# Patient Record
Sex: FEMALE | Race: WHITE | NOT HISPANIC OR LATINO | Employment: FULL TIME | URBAN - METROPOLITAN AREA
[De-identification: names, ages, dates, MRNs, and addresses within clinical notes are randomized per-mention and may not be internally consistent; named-entity substitution may affect disease eponyms.]

---

## 2018-02-19 ENCOUNTER — OFFICE VISIT (OUTPATIENT)
Dept: DERMATOLOGY | Facility: CLINIC | Age: 33
End: 2018-02-19
Payer: COMMERCIAL

## 2018-02-19 VITALS — HEART RATE: 67 BPM | DIASTOLIC BLOOD PRESSURE: 75 MMHG | SYSTOLIC BLOOD PRESSURE: 120 MMHG | OXYGEN SATURATION: 100 %

## 2018-02-19 DIAGNOSIS — D18.00 ANGIOMA: ICD-10-CM

## 2018-02-19 DIAGNOSIS — L82.1 SEBORRHEIC KERATOSIS: ICD-10-CM

## 2018-02-19 DIAGNOSIS — D48.5 NEOPLASM OF UNCERTAIN BEHAVIOR OF SKIN: Primary | ICD-10-CM

## 2018-02-19 DIAGNOSIS — L81.4 LENTIGO: ICD-10-CM

## 2018-02-19 DIAGNOSIS — D22.9 NEVUS: ICD-10-CM

## 2018-02-19 PROCEDURE — 88305 TISSUE EXAM BY PATHOLOGIST: CPT | Performed by: PHYSICIAN ASSISTANT

## 2018-02-19 PROCEDURE — 11101 HC BIOPSY SKIN/SUBQ/MUC MEM, SINGLE LESION: CPT | Performed by: PHYSICIAN ASSISTANT

## 2018-02-19 PROCEDURE — 88342 IMHCHEM/IMCYTCHM 1ST ANTB: CPT | Performed by: PHYSICIAN ASSISTANT

## 2018-02-19 PROCEDURE — 11100 HC BIOPSY SKIN/SUBQ/MUC MEM, EACH ADDTL LESION: CPT | Performed by: PHYSICIAN ASSISTANT

## 2018-02-19 PROCEDURE — 99204 OFFICE O/P NEW MOD 45 MIN: CPT | Mod: 25 | Performed by: PHYSICIAN ASSISTANT

## 2018-02-19 PROCEDURE — 88341 IMHCHEM/IMCYTCHM EA ADD ANTB: CPT | Performed by: PHYSICIAN ASSISTANT

## 2018-02-19 RX ORDER — EPINEPHRINE 0.3 MG/.3ML
0.3 INJECTION SUBCUTANEOUS PRN
Status: ON HOLD | COMMUNITY
Start: 2016-08-19 | End: 2018-10-13

## 2018-02-19 NOTE — PATIENT INSTRUCTIONS
Wound Care Instructions     FOR SUPERFICIAL WOUNDS     [facility] [phone]    [facility] [phone]                       AFTER 24 HOURS YOU SHOULD REMOVE THE BANDAGE AND BEGIN DAILY DRESSING CHANGES AS FOLLOWS:     1) Remove Dressing.     2) Clean and dry the area with tap water using a Q-tip or sterile gauze pad.     3) Apply Vaseline, Aquaphor, Polysporin ointment or Bacitracin ointment over entire wound.  Do NOT use Neosporin ointment.     4) Cover the wound with a band-aid, or a sterile non-stick gauze pad and micropore paper tape      REPEAT THESE INSTRUCTIONS AT LEAST ONCE A DAY UNTIL THE WOUND HAS COMPLETELY HEALED.    It is an old wives tale that a wound heals better when it is exposed to air and allowed to dry out. The wound will heal faster with a better cosmetic result if it is kept moist with ointment and covered with a bandage.    **Do not let the wound dry out.**      Supplies Needed:      *Cotton tipped applicators (Q-tips)    *Polysporin Ointment or Bacitracin Ointment (NOT NEOSPORIN)    *Band-aids or non-stick gauze pads and micropore paper tape.      PATIENT INFORMATION:    During the healing process you will notice a number of changes. All wounds develop a small halo of redness surrounding the wound.  This means healing is occurring. Severe itching with extensive redness usually indicates sensitivity to the ointment or bandage tape used to dress the wound.  You should call our office if this develops.      Swelling  and/or discoloration around your surgical site is common, particularly when performed around the eye.    All wounds normally drain.  The larger the wound the more drainage there will be.  After 7-10 days, you will notice the wound beginning to shrink and new skin will begin to grow.  The wound is healed when you can see skin has formed over the entire area.  A healed wound has a healthy, shiny look to the surface and is red to dark pink in color  to normalize.  Wounds may take approximately 4-6 weeks to heal.  Larger wounds may take 6-8 weeks.  After the wound is healed you may discontinue dressing changes.    You may experience a sensation of tightness as your wound heals. This is normal and will gradually subside.    Your healed wound may be sensitive to temperature changes. This sensitivity improves with time, but if you re having a lot of discomfort, try to avoid temperature extremes.    Patients frequently experience itching after their wound appears to have healed because of the continue healing under the skin.  Plain Vaseline will help relieve the itching.        POSSIBLE COMPLICATIONS    BLEEDIN. Leave the bandage in place.  2. Use tightly rolled up gauze or a cloth to apply direct pressure over the bandage for 30  minutes.  3. Reapply pressure for an additional 30 minutes if necessary  4. Use additional gauze and tape to maintain pressure once the bleeding has stopped.

## 2018-02-19 NOTE — PROGRESS NOTES
HPI:   Venessa Guillen is a 32 year old female who presents for Full skin cancer screening.  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 Was on oncology unit prior to that  On review of systems, there are no further skin complaints, patient is feeling otherwise well.  See patient intake sheet.  ROS of the following were done and are negative: Constitutional, Eyes, Ears, Nose,   Mouth, Throat, Cardiovascular, Respiratory, GI, Genitourinary, Musculoskeletal,   Psychiatric, Endocrine, Allergic/Immunologic.    PHYSICAL EXAM:   Weight:  BP:   Skin exam performed as follows: Type 2 skin. Mood appropriate  Alert and Oriented X 3. Well developed, well nourished in no distress.  General appearance: Normal  Head including face: Normal  Eyes: conjunctiva and lids: Normal  Mouth: Lips, teeth, gums: Normal  Neck: Normal  Chest-breast/axillae: Normal  Back: Normal  Spleen and liver: Normal  Cardiovascular: Exam of peripheral vascular system by observation for swelling, varicosities, edema: Normal  Genitalia: groin, buttocks: Normal  Extremities: digits/nails (clubbing): Normal  Eccrine and Apocrine glands: Normal  Right upper extremity: Normal  Left upper extremity: Normal  Right lower extremity: Normal  Left lower extremity: Normal  Skin: Scalp and body hair: See below    Pt deferred exam of breasts, groin, buttocks: No    Other physical findings:  1. Multiple pigmented macules on extremities and trunk  2. Multiple pigmented macules on face, trunk and extremities  3. Multiple vascular papules on trunk, arms and legs  4.  Multiple scattered keratotic plaques  5. 7 mm irregular stellate papule on the right abdomen  6. 4 mm irregular red/brown papule on the right lower back       Except as noted above, no other signs of skin cancer or melanoma.     ASSESSMENT/PLAN:   Benign Full skin cancer screening today. . Patient with history of dysplastic nevus  Advised on monthly self exams and 1 year  Patient Education: Appropriate brochures given.    Multiple benign appearing nevi on arms, legs and trunk. Discussed ABCDEs of melanoma and sunscreen.   Multiple lentigos on arms, legs and trunk. Advised benign, no treatment needed.  Multiple scattered angiomas. Advised benign, no treatment needed.   Seborrheic keratosis on arms, legs and trunk. Advised benign, no treatment needed.  Atypical nevus on the right abdomen, right lower back - advised. Anesthestized with lidocaine and area cleaned with betadine. Shave removal performed and specimen placed in formalin. Patient will receive call with results.         Follow-up: yearly FSE/PRN sooner    1.) Patient was asked about new and changing moles. YES  2.) Patient received a complete physical skin examination: YES  3.) Patient was counseled to perform a monthly self skin examination: YES  Scribed By: Brittany Hampton, MS, PA-C

## 2018-02-19 NOTE — MR AVS SNAPSHOT
After Visit Summary   2/19/2018    Venessa Guillen    MRN: [id number]           Patient Information     Date Of Birth          1985        Visit Information        Provider Department      2/19/2018 11:30 AM Brittany Hampton PA-C [facility]        Today's Diagnoses     Neoplasm of uncertain behavior of skin    -  1      Care Instructions          Wound Care Instructions     FOR SUPERFICIAL WOUNDS     [facility] [phone]    [facility][1] [phone]                       AFTER 24 HOURS YOU SHOULD REMOVE THE BANDAGE AND BEGIN DAILY DRESSING CHANGES AS FOLLOWS:     1) Remove Dressing.     2) Clean and dry the area with tap water using a Q-tip or sterile gauze pad.     3) Apply Vaseline, Aquaphor, Polysporin ointment or Bacitracin ointment over entire wound.  Do NOT use Neosporin ointment.     4) Cover the wound with a band-aid, or a sterile non-stick gauze pad and micropore paper tape      REPEAT THESE INSTRUCTIONS AT LEAST ONCE A DAY UNTIL THE WOUND HAS COMPLETELY HEALED.    It is an old wives tale that a wound heals better when it is exposed to air and allowed to dry out. The wound will heal faster with a better cosmetic result if it is kept moist with ointment and covered with a bandage.    **Do not let the wound dry out.**      Supplies Needed:      *Cotton tipped applicators (Q-tips)    *Polysporin Ointment or Bacitracin Ointment (NOT NEOSPORIN)    *Band-aids or non-stick gauze pads and micropore paper tape.      PATIENT INFORMATION:    During the healing process you will notice a number of changes. All wounds develop a small halo of redness surrounding the wound.  This means healing is occurring. Severe itching with extensive redness usually indicates sensitivity to the ointment or bandage tape used to dress the wound.  You should call our office if this develops.      Swelling  and/or discoloration around your surgical site is common, particularly when  performed around the eye.    All wounds normally drain.  The larger the wound the more drainage there will be.  After 7-10 days, you will notice the wound beginning to shrink and new skin will begin to grow.  The wound is healed when you can see skin has formed over the entire area.  A healed wound has a healthy, shiny look to the surface and is red to dark pink in color to normalize.  Wounds may take approximately 4-6 weeks to heal.  Larger wounds may take 6-8 weeks.  After the wound is healed you may discontinue dressing changes.    You may experience a sensation of tightness as your wound heals. This is normal and will gradually subside.    Your healed wound may be sensitive to temperature changes. This sensitivity improves with time, but if you re having a lot of discomfort, try to avoid temperature extremes.    Patients frequently experience itching after their wound appears to have healed because of the continue healing under the skin.  Plain Vaseline will help relieve the itching.        POSSIBLE COMPLICATIONS    BLEEDIN. Leave the bandage in place.  2. Use tightly rolled up gauze or a cloth to apply direct pressure over the bandage for 30  minutes.  3. Reapply pressure for an additional 30 minutes if necessary  4. Use additional gauze and tape to maintain pressure once the bleeding has stopped.            Follow-ups after your visit        Your next 10 appointments already scheduled     Mar 02, 2018  1:00 PM CST   PHYSICAL with [facility]oj[facility] Mc [facility]   [facility]    [address] [address] [phone]              Who to contact     If you have questions or need follow up information about today's clinic visit or your schedule please contact [facility] directly at [phone].  Normal or non-critical lab and imaging results will be communicated to you by MyChart, letter or phone within 4 business days after the  "clinic has received the results. If you do not hear from us within 7 days, please contact the clinic through ixigo or phone. If you have a critical or abnormal lab result, we will notify you by phone as soon as possible.  Submit refill requests through ixigo or call your pharmacy and they will forward the refill request to us. Please allow 3 business days for your refill to be completed.          Additional Information About Your Visit        [employer] Information     ixigo lets you send messages to your doctor, view your test results, renew your prescriptions, schedule appointments and more. To sign up, go to [email or web address] . Click on \"Log in\" on the left side of the screen, which will take you to the Welcome page. Then click on \"Sign up Now\" on the right side of the page.     You will be asked to enter the access code listed below, as well as some personal information. Please follow the directions to create your username and password.     Your access code is: [email or web address]  Expires: 2018 11:48 AM     Your access code will  in 90 days. If you need help or a new code, please call your [place] clinic or [phone].        Care EveryWhere ID     This is your Care EveryWhere ID. This could be used by other organizations to access your [place] medical records  [id number]        Your Vitals Were     Pulse Pulse Oximetry                67 100%           Blood Pressure from Last 3 Encounters:   18 120/75   11 121/88    Weight from Last 3 Encounters:   No data found for Wt              We Performed the Following     BIOPSY SKIN/SUBQ/MUC MEM, EACH ADDTL LESION     BIOPSY SKIN/SUBQ/MUC MEM, SINGLE LESION     Surgical pathology exam        Primary Care Provider Fax #    Physician No Ref-Primary [phone]       No address on file        Equal Access to Services     ANDRÉS HARDIN AH: Mary Beth Bella, katie chou, wiliam alba, lindsay cadena " nancy eagle ah. So [facility] [phone].    ATENCIÓN: Si habla royer, tiene a noland disposición servicios gratuitos de asistencia lingüística. Faizan al [phone].    We comply with applicable federal civil rights laws and [place] laws. We do not discriminate on the basis of race, color, national origin, age, disability, sex, sexual orientation, or gender identity.            Thank you!     Thank you for choosing [facility]  for your care. Our goal is always to provide you with excellent care. Hearing back from our patients is one way we can continue to improve our services. Please take a few minutes to complete the written survey that you may receive in the mail after your visit with us. Thank you!             Your Updated Medication List - Protect others around you: Learn how to safely use, store and throw away your medicines at www.disposemymeds.org.          This list is accurate as of 2/19/18 11:48 AM.  Always use your most recent med list.                   Brand Name Dispense Instructions for use Diagnosis    EPINEPHrine 0.3 MG/0.3ML injection 2-pack    EPIPEN/ADRENACLICK/or ANY BX GENERIC EQUIV     Inject 0.3 mg into the muscle        UNKNOWN MED DOSAGE      BIRTH CONTROL 1 TABLET DAILY

## 2018-02-19 NOTE — LETTER
2/19/2018         RE: Venessa Guillen  [address]  [place] [address]        Dear Colleague,    Thank you for referring your patient, Venessa Guillen, to the [facility]. Please see a copy of my visit note below.    HPI:   Venessa Guillen is a 32 year old female who presents for Full skin cancer screening.  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 Was on oncology unit prior to that  On review of systems, there are no further skin complaints, patient is feeling otherwise well.  See patient intake sheet.  ROS of the following were done and are negative: Constitutional, Eyes, Ears, Nose,   Mouth, Throat, Cardiovascular, Respiratory, GI, Genitourinary, Musculoskeletal,   Psychiatric, Endocrine, Allergic/Immunologic.    PHYSICAL EXAM:   Weight:  BP:   Skin exam performed as follows: Type 2 skin. Mood appropriate  Alert and Oriented X 3. Well developed, well nourished in no distress.  General appearance: Normal  Head including face: Normal  Eyes: conjunctiva and lids: Normal  Mouth: Lips, teeth, gums: Normal  Neck: Normal  Chest-breast/axillae: Normal  Back: Normal  Spleen and liver: Normal  Cardiovascular: Exam of peripheral vascular system by observation for swelling, varicosities, edema: Normal  Genitalia: groin, buttocks: Normal  Extremities: digits/nails (clubbing): Normal  Eccrine and Apocrine glands: Normal  Right upper extremity: Normal  Left upper extremity: Normal  Right lower extremity: Normal  Left lower extremity: Normal  Skin: Scalp and body hair: See below    Pt deferred exam of  breasts, groin, buttocks: No    Other physical findings:  1. Multiple pigmented macules on extremities and trunk  2. Multiple pigmented macules on face, trunk and extremities  3. Multiple vascular papules on trunk, arms and legs  4. Multiple scattered keratotic plaques  5. 7 mm irregular stellate papule on the right abdomen  6. 4 mm irregular red/brown papule on the right lower back       Except as noted above, no other signs of skin cancer or melanoma.     ASSESSMENT/PLAN:   Benign Full skin cancer screening today. . Patient with history of dysplastic nevus  Advised on monthly self exams and 1 year  Patient Education: Appropriate brochures given.    Multiple benign appearing nevi on arms, legs and trunk. Discussed ABCDEs of melanoma and sunscreen.   Multiple lentigos on arms, legs and trunk. Advised benign, no treatment needed.  Multiple scattered angiomas. Advised benign, no treatment needed.   Seborrheic keratosis on arms, legs and trunk. Advised benign, no treatment needed.  Atypical nevus on the right abdomen, right lower back - advised. Anesthestized with lidocaine and area cleaned with betadine. Shave removal performed and specimen placed in formalin. Patient will receive call with results.         Follow-up: yearly FSE/PRN sooner    1.) Patient was asked about new and changing moles. YES  2.) Patient received a complete physical skin examination: YES  3.) Patient was counseled to perform a monthly self skin examination: YES  Scribed By: Brittany Hampton MS, PAEvaC      Again, thank you for allowing me to participate in the care of your patient.        Sincerely,        Brittany Hampton PA-C

## 2018-02-20 ENCOUNTER — PRENATAL OFFICE VISIT (OUTPATIENT)
Dept: OBGYN | Facility: CLINIC | Age: 33
End: 2018-02-20

## 2018-02-20 ENCOUNTER — APPOINTMENT (OUTPATIENT)
Dept: OBGYN | Facility: CLINIC | Age: 33
End: 2018-02-20
Payer: COMMERCIAL

## 2018-02-20 DIAGNOSIS — Z34.00 PRENATAL CARE, FIRST PREGNANCY: Primary | ICD-10-CM

## 2018-02-20 PROCEDURE — 99207 ZZC NO CHARGE NURSE ONLY: CPT | Performed by: OBSTETRICS & GYNECOLOGY

## 2018-02-20 NOTE — MR AVS SNAPSHOT
After Visit Summary   2/20/2018    Venessa Guillen    MRN: [id number]           Patient Information     Date Of Birth          1985        Visit Information        Provider Department      2/20/2018 4:49 PM Aniya Malik MD [facility]        Today's Diagnoses     Prenatal care, first pregnancy    -  1       Follow-ups after your visit        Your next 10 appointments already scheduled     Mar 06, 2018  2:00 PM CST   New Prenatal with Aniya Malik MD, [facility]    [address] [address]   [phone]              Who to contact     If you have questions or need follow up information about today's clinic visit or your schedule please contact [facility] directly at [phone].  Normal or non-critical lab and imaging results will be communicated to you by MyChart, letter or phone within 4 business days after the clinic has received the results. If you do not hear from us within 7 days, please contact the clinic through MyChart or phone. If you have a critical or abnormal lab result, we will notify you by phone as soon as possible.  Submit refill requests through [employer] B[employer] or call your pharmacy and they will forward the refill request to us. Please allow 3 business days for your refill to be completed.          Additional Information About Your Visit        MyChart Information     X B[employer] gives you secure access to your electronic health record. If you see a primary care provider, you can also send messages to your care team and make appointments. If you have questions, please call your primary care clinic.  If you do not have a primary care provider, please call [phone] and they will assist you.        Care EveryWhere ID     This is your Care EveryWhere ID. This could be used by other organizations to access your [place] medical records  [id number]        Your Vitals Were      Last Period                   01/09/2018            Blood Pressure from Last 3 Encounters:   02/19/18 120/75   07/24/11 121/88    Weight from Last 3 Encounters:   No data found for Wt              Today, you had the following     No orders found for display       Primary Care Provider Fax #    Physician No Ref-Primary [phone]       No address on file        Equal Access to Services     ALFREDO WILFRED : Hadii aad ku hadlenchoo Soomaali, waaxda luqadaha, qaybta kaalmada adeelizabethda, lindsay reilly gemmasaloni cadena danaejose eagle . So [facility] [phone].    ATENCIÓN: Si habla español, tiene a noland disposición servicios gratuitos de asistencia lingüística. Llame al [phone].    We comply with applicable federal civil rights laws and [place] laws. We do not discriminate on the basis of race, color, national origin, age, disability, sex, sexual orientation, or gender identity.            Thank you!     Thank you for choosing [facility]  for your care. Our goal is always to provide you with excellent care. Hearing back from our patients is one way we can continue to improve our services. Please take a few minutes to complete the written survey that you may receive in the mail after your visit with us. Thank you!             Your Updated Medication List - Protect others around you: Learn how to safely use, store and throw away your medicines at www.disposemymeds.org.          This list is accurate as of 2/20/18  5:07 PM.  Always use your most recent med list.                   Brand Name Dispense Instructions for use Diagnosis    EPINEPHrine 0.3 MG/0.3ML injection 2-pack    EPIPEN/ADRENACLICK/or ANY BX GENERIC EQUIV     Inject 0.3 mg into the muscle        PRENATAL ADULT GUMMY/DHA/FA PO      Take 2 chew tab by mouth

## 2018-02-23 LAB — COPATH REPORT: NORMAL

## 2018-02-24 ENCOUNTER — HEALTH MAINTENANCE LETTER (OUTPATIENT)
Age: 33
End: 2018-02-24

## 2018-03-04 ENCOUNTER — NURSE TRIAGE (OUTPATIENT)
Dept: NURSING | Facility: CLINIC | Age: 33
End: 2018-03-04

## 2018-03-04 NOTE — TELEPHONE ENCOUNTER
Patient asking what she can take for constipation as she is pregnant.  FNA provided information from [facility] - Pregnancy meds.

## 2018-03-06 ENCOUNTER — PRENATAL OFFICE VISIT (OUTPATIENT)
Dept: OBGYN | Facility: CLINIC | Age: 33
End: 2018-03-06
Payer: COMMERCIAL

## 2018-03-06 VITALS
DIASTOLIC BLOOD PRESSURE: 83 MMHG | BODY MASS INDEX: 22.36 KG/M2 | HEIGHT: 65 IN | WEIGHT: 134.2 LBS | OXYGEN SATURATION: 100 % | TEMPERATURE: 99 F | HEART RATE: 75 BPM | RESPIRATION RATE: 16 BRPM | SYSTOLIC BLOOD PRESSURE: 137 MMHG

## 2018-03-06 DIAGNOSIS — Z23 NEED FOR PROPHYLACTIC VACCINATION AND INOCULATION AGAINST INFLUENZA: ICD-10-CM

## 2018-03-06 DIAGNOSIS — Z23 NEEDS FLU SHOT: ICD-10-CM

## 2018-03-06 DIAGNOSIS — Z34.01 ENCOUNTER FOR PRENATAL CARE IN FIRST TRIMESTER OF FIRST PREGNANCY: Primary | ICD-10-CM

## 2018-03-06 LAB
ABO + RH BLD: NORMAL
ABO + RH BLD: NORMAL
BLD GP AB SCN SERPL QL: NORMAL
BLOOD BANK CMNT PATIENT-IMP: NORMAL
ERYTHROCYTE [DISTWIDTH] IN BLOOD BY AUTOMATED COUNT: 12.5 % (ref 10–15)
HCT VFR BLD AUTO: 44.2 % (ref 35–47)
HGB BLD-MCNC: 15.2 G/DL (ref 11.7–15.7)
MCH RBC QN AUTO: 31.2 PG (ref 26.5–33)
MCHC RBC AUTO-ENTMCNC: 34.4 G/DL (ref 31.5–36.5)
MCV RBC AUTO: 91 FL (ref 78–100)
PLATELET # BLD AUTO: 246 10E9/L (ref 150–450)
RBC # BLD AUTO: 4.87 10E12/L (ref 3.8–5.2)
SPECIMEN EXP DATE BLD: NORMAL
WBC # BLD AUTO: 8.8 10E9/L (ref 4–11)

## 2018-03-06 PROCEDURE — 99207 ZZC FIRST OB VISIT: CPT | Performed by: OBSTETRICS & GYNECOLOGY

## 2018-03-06 PROCEDURE — 86900 BLOOD TYPING SEROLOGIC ABO: CPT | Performed by: OBSTETRICS & GYNECOLOGY

## 2018-03-06 PROCEDURE — 87340 HEPATITIS B SURFACE AG IA: CPT | Performed by: OBSTETRICS & GYNECOLOGY

## 2018-03-06 PROCEDURE — 86901 BLOOD TYPING SEROLOGIC RH(D): CPT | Performed by: OBSTETRICS & GYNECOLOGY

## 2018-03-06 PROCEDURE — 87491 CHLMYD TRACH DNA AMP PROBE: CPT | Performed by: OBSTETRICS & GYNECOLOGY

## 2018-03-06 PROCEDURE — 90471 IMMUNIZATION ADMIN: CPT | Performed by: OBSTETRICS & GYNECOLOGY

## 2018-03-06 PROCEDURE — G0145 SCR C/V CYTO,THINLAYER,RESCR: HCPCS | Performed by: OBSTETRICS & GYNECOLOGY

## 2018-03-06 PROCEDURE — 90686 IIV4 VACC NO PRSV 0.5 ML IM: CPT | Performed by: OBSTETRICS & GYNECOLOGY

## 2018-03-06 PROCEDURE — 36415 COLL VENOUS BLD VENIPUNCTURE: CPT | Performed by: OBSTETRICS & GYNECOLOGY

## 2018-03-06 PROCEDURE — 86780 TREPONEMA PALLIDUM: CPT | Performed by: OBSTETRICS & GYNECOLOGY

## 2018-03-06 PROCEDURE — 86762 RUBELLA ANTIBODY: CPT | Performed by: OBSTETRICS & GYNECOLOGY

## 2018-03-06 PROCEDURE — 85027 COMPLETE CBC AUTOMATED: CPT | Performed by: OBSTETRICS & GYNECOLOGY

## 2018-03-06 PROCEDURE — 87591 N.GONORRHOEAE DNA AMP PROB: CPT | Performed by: OBSTETRICS & GYNECOLOGY

## 2018-03-06 PROCEDURE — 87624 HPV HI-RISK TYP POOLED RSLT: CPT | Performed by: OBSTETRICS & GYNECOLOGY

## 2018-03-06 PROCEDURE — 86850 RBC ANTIBODY SCREEN: CPT | Performed by: OBSTETRICS & GYNECOLOGY

## 2018-03-06 PROCEDURE — 87389 HIV-1 AG W/HIV-1&-2 AB AG IA: CPT | Performed by: OBSTETRICS & GYNECOLOGY

## 2018-03-06 NOTE — PROGRESS NOTES

## 2018-03-06 NOTE — PROGRESS NOTES
"Venesas is a 32 year old  @ 8.0 weeks here for new ob visit.  Planned pregnancy      ROS: Ten point review of systems was reviewed and negative except the above.  Current Issues include: fatigue    OBhx: never pregnant  Gyne: Pap smears Normal  history of STD No STD history  Past Medical History:   Diagnosis Date     Chickenpox      Past Surgical History:   Procedure Laterality Date     COLONOSCOPY      x2     MOUTH SURGERY      wisdom teeth     Patient Active Problem List    Diagnosis Date Noted     Prenatal care, first pregnancy 2018     Priority: Medium     CARDIOVASCULAR SCREENING; LDL GOAL LESS THAN 160 10/31/2010     Priority: Medium        Allergies   Allergen Reactions     Cefaclor      Shellfish Allergy        Current Outpatient Prescriptions on File Prior to Visit:  Prenatal MV & Min w/FA-DHA (PRENATAL ADULT GUMMY/DHA/FA PO) Take 2 chew tab by mouth   EPINEPHrine (EPIPEN/ADRENACLICK/OR ANY BX GENERIC EQUIV) 0.3 MG/0.3ML injection 2-pack Inject 0.3 mg into the muscle     No current facility-administered medications on file prior to visit.     Past Medical History of Father of Baby:   No significant medical history    Physical Exam: /83 (BP Location: Left arm, Patient Position: Sitting, Cuff Size: Adult Regular)  Pulse 75  Temp 99  F (37.2  C) (Tympanic)  Resp 16  Ht 5' 5.35\" (1.66 m)  Wt 134 lb 3.2 oz (60.9 kg)  LMP 2018  SpO2 100%  BMI 22.09 kg/m2  General: Well developed, well nourished female  Skin: Normal  HEENT: Normal  Neck: Supple,no adenopathy,thyroid normal  Chest: Clear  Heart: Regular rate, rhythm,No murmur, rub, gallop  Breasts: Not examined   Abdomen: Benign,Soft, flat, non-tender,No masses, organomegaly,No inguinal nodes,Bowel sounds normoactive   Extremities: Normal  Neurological: Normal   Perineum: Normal   Vulva: Normal  Vagina: Normal mucosa, no discharge  Cervix: Nulliparous, closed, mobile,  no discharge and Minimal bleeding following Pap  Uterus: 8 weeks, " Normal shape, position and consistency   Adnexa: Normal  Rectum: deferred, Normal without lesion or mass   Bony Pelvis: Adequate     Transvaginal ultrasound was performed.  A viable intrauterine pregnancy was seen.  CRL consistent with 7 weeks, 2 days.  Fetal heart motion was visualized.    EDC by LMP: 10/16/18   EDC by sono:  10/21/18  Final EDC: 10/16/18    A/P 32 year old  at  8 weeks    1. Discussed physician coverage, tertiary support, diet, exercise, weight gain, schedule of visits, routine and indicated ultrasounds, and childbirth education.    2. Options for  testing for chromosomal and birth defects were discussed with the patient including nuchal lucency/blood marker testing in the first trimester and quad screening and/or Level 2 ultrasound in the second trimester.  We discussed that these are screening tests and not diagnostic tests and that false positives and negatives are a distinct possibility.  We discussed that follow up diagnostic testing would include chorionic villus sampling or amniocentesis depending on gestational age.    3. Prenatal labs, pap, GC, Chlamydia    4. Prenatal Vitamins      Aniya Malik M.D.

## 2018-03-06 NOTE — MR AVS SNAPSHOT
After Visit Summary   3/6/2018    Venessa Guillen    MRN: [id number]           Patient Information     Date Of Birth          1985        Visit Information        Provider Department      3/6/2018 2:00 PM Aniya Malik MD; [facility] RM 1 [facility]        Today's Diagnoses     Encounter for prenatal care in first trimester of first pregnancy    -  1    Needs flu shot        Need for prophylactic vaccination and inoculation against influenza           Follow-ups after your visit        Your next 10 appointments already scheduled     Apr 06, 2018  3:45 PM CDT   ESTABLISHED PRENATAL with Yojana Mc MD   [facility] ([facility])    [address] [address]   [phone]              Who to contact     If you have questions or need follow up information about today's clinic visit or your schedule please contact [facility] directly at [phone].  Normal or non-critical lab and imaging results will be communicated to you by MyChart, letter or phone within 4 business days after the clinic has received the results. If you do not hear from us within 7 days, please contact the clinic through [email or web address] or phone. If you have a critical or abnormal lab result, we will notify you by phone as soon as possible.  Submit refill requests through H2scan or call your pharmacy and they will forward the refill request to us. Please allow 3 business days for your refill to be completed.          Additional Information About Your Visit        MyChart Information     H2scan gives you secure access to your electronic health record. If you see a primary care provider, you can also send messages to your care team and make appointments. If you have questions, please call your primary care clinic.  If you do not have a primary care provider, please call [phone] and they will assist you.        Care EveryWhere ID     This is  "your Care EveryWhere ID. This could be used by other organizations to access your [facility] medical records  [id number]        Your Vitals Were     Pulse Temperature Respirations Height Last Period Pulse Oximetry    75 99  F (37.2  C) (Tympanic) 16 5' 5.35\" (1.66 m) 01/09/2018 100%    BMI (Body Mass Index)                   22.09 kg/m2            Blood Pressure from Last 3 Encounters:   03/06/18 137/83   02/19/18 120/75   07/24/11 121/88    Weight from Last 3 Encounters:   03/06/18 134 lb 3.2 oz (60.9 kg)              We Performed the Following     *UA reflex to Microscopic     ABO/Rh type and screen     Anti Treponema     CBC with platelets     CHLAMYDIA TRACHOMATIS PCR     FLU VAC, SPLIT VIRUS IM > 3 YO (QUADRIVALENT) [70238]     Hepatitis B surface antigen     HIV Antigen Antibody Combo     HPV High Risk Types DNA Cervical     NEISSERIA GONORRHOEA PCR     Pap imaged thin layer screen with HPV - recommended age 30 - 65 years (select HPV order below)     Rubella Antibody IgG Quantitative     Urine Culture Aerobic Bacterial     US OB <14 Weeks w Transvaginal Single     Vaccine Administration, Initial [[id number]]        Primary Care Provider Fax #    Physician No Ref-Primary [phone]       No address on file        Equal Access to Services     ANDRÉS HARDIN : Hadii hermann story Somegan, waaxda luqadaha, qaybta kaalmada ademargiyada, lindsay heard. So [facility] [phone].    ATENCIÓN: Si habla español, tiene a noland disposición servicios gratuitos de asistencia lingüística. Llame al [phone].    We comply with applicable federal civil rights laws and [place] laws. We do not discriminate on the basis of race, color, national origin, age, disability, sex, sexual orientation, or gender identity.            Thank you!     Thank you for choosing [facility]  for your care. Our goal is always to provide you with excellent care. Hearing back from our patients is one way we can " continue to improve our services. Please take a few minutes to complete the written survey that you may receive in the mail after your visit with us. Thank you!             Your Updated Medication List - Protect others around you: Learn how to safely use, store and throw away your medicines at www.disposemymeds.org.          This list is accurate as of 3/6/18  3:28 PM.  Always use your most recent med list.                   Brand Name Dispense Instructions for use Diagnosis    EPINEPHrine 0.3 MG/0.3ML injection 2-pack    EPIPEN/ADRENACLICK/or ANY BX GENERIC EQUIV     Inject 0.3 mg into the muscle        PRENATAL ADULT GUMMY/DHA/FA PO      Take 2 chew tab by mouth

## 2018-03-07 LAB
C TRACH DNA SPEC QL NAA+PROBE: NEGATIVE
HBV SURFACE AG SERPL QL IA: NONREACTIVE
HIV 1+2 AB+HIV1 P24 AG SERPL QL IA: NONREACTIVE
N GONORRHOEA DNA SPEC QL NAA+PROBE: NEGATIVE
RUBV IGG SERPL IA-ACNC: 72 IU/ML
SPECIMEN SOURCE: NORMAL
SPECIMEN SOURCE: NORMAL
T PALLIDUM IGG+IGM SER QL: NEGATIVE

## 2018-03-08 LAB
COPATH REPORT: NORMAL
PAP: NORMAL

## 2018-03-09 LAB
FINAL DIAGNOSIS: NORMAL
HPV HR 12 DNA CVX QL NAA+PROBE: NEGATIVE
HPV16 DNA SPEC QL NAA+PROBE: NEGATIVE
HPV18 DNA SPEC QL NAA+PROBE: NEGATIVE
SPECIMEN DESCRIPTION: NORMAL
SPECIMEN SOURCE CVX/VAG CYTO: NORMAL

## 2018-04-04 DIAGNOSIS — Z34.01 ENCOUNTER FOR PRENATAL CARE IN FIRST TRIMESTER OF FIRST PREGNANCY: ICD-10-CM

## 2018-04-04 LAB
ALBUMIN UR-MCNC: NEGATIVE MG/DL
APPEARANCE UR: CLEAR
BILIRUB UR QL STRIP: NEGATIVE
COLOR UR AUTO: YELLOW
GLUCOSE UR STRIP-MCNC: NEGATIVE MG/DL
HGB UR QL STRIP: ABNORMAL
KETONES UR STRIP-MCNC: NEGATIVE MG/DL
LEUKOCYTE ESTERASE UR QL STRIP: ABNORMAL
NITRATE UR QL: NEGATIVE
PH UR STRIP: 6.5 PH (ref 5–7)
RBC #/AREA URNS AUTO: ABNORMAL /HPF
SOURCE: ABNORMAL
SP GR UR STRIP: <=1.005 (ref 1–1.03)
URATE CRY #/AREA URNS HPF: ABNORMAL /HPF
UROBILINOGEN UR STRIP-ACNC: 0.2 EU/DL (ref 0.2–1)
WBC #/AREA URNS AUTO: ABNORMAL /HPF

## 2018-04-04 PROCEDURE — 87086 URINE CULTURE/COLONY COUNT: CPT | Performed by: OBSTETRICS & GYNECOLOGY

## 2018-04-04 PROCEDURE — 81001 URINALYSIS AUTO W/SCOPE: CPT | Performed by: OBSTETRICS & GYNECOLOGY

## 2018-04-06 ENCOUNTER — PRENATAL OFFICE VISIT (OUTPATIENT)
Dept: OBGYN | Facility: CLINIC | Age: 33
End: 2018-04-06
Payer: COMMERCIAL

## 2018-04-06 VITALS
RESPIRATION RATE: 18 BRPM | HEIGHT: 66 IN | SYSTOLIC BLOOD PRESSURE: 135 MMHG | BODY MASS INDEX: 22.02 KG/M2 | TEMPERATURE: 99.4 F | WEIGHT: 137 LBS | DIASTOLIC BLOOD PRESSURE: 71 MMHG | HEART RATE: 80 BPM

## 2018-04-06 DIAGNOSIS — Z34.01 ENCOUNTER FOR PRENATAL CARE IN FIRST TRIMESTER OF FIRST PREGNANCY: Primary | ICD-10-CM

## 2018-04-06 LAB
BACTERIA SPEC CULT: NORMAL
Lab: NORMAL
SPECIMEN SOURCE: NORMAL

## 2018-04-06 PROCEDURE — 99207 ZZC PRENATAL VISIT: CPT | Performed by: OBSTETRICS & GYNECOLOGY

## 2018-04-06 NOTE — PROGRESS NOTES
"CC: Here for routine prenatal visit @ 12w3d   HPI:  Reviewed lab results; pt feeling well; declines  screening for Down's syndrome    PE: /71 (BP Location: Left arm, Patient Position: Chair, Cuff Size: Adult Small)  Pulse 80  Temp 99.4  F (37.4  C) (Tympanic)  Resp 18  Ht 5' 5.75\" (1.67 m)  Wt 137 lb (62.1 kg)  LMP 2018  BMI 22.28 kg/m2   See OB flowsheet      A:  1. Encounter for prenatal care in first trimester of first pregnancy        Routine prenatal care  RTC 4 weeks.      Yojana Mc M.D.     "

## 2018-04-06 NOTE — NURSING NOTE
"Initial /71 (BP Location: Left arm, Patient Position: Chair, Cuff Size: Adult Small)  Pulse 80  Temp 99.4  F (37.4  C) (Tympanic)  Resp 18  Ht 5' 5.75\" (1.67 m)  Wt 137 lb (62.1 kg)  LMP 01/09/2018  BMI 22.28 kg/m2 Estimated body mass index is 22.28 kg/(m^2) as calculated from the following:    Height as of this encounter: 5' 5.75\" (1.67 m).    Weight as of this encounter: 137 lb (62.1 kg). .      "

## 2018-04-06 NOTE — MR AVS SNAPSHOT
After Visit Summary   4/6/2018    Venessa Guillen    MRN: [id number]           Patient Information     Date Of Birth          1985        Visit Information        Provider Department      4/6/2018 3:45 PM Yojana Mc MD [facility]        Today's Diagnoses     Encounter for prenatal care in first trimester of first pregnancy    -  1       Follow-ups after your visit        Your next 10 appointments already scheduled     May 04, 2018  1:15 PM CDT   ESTABLISHED PRENATAL with Yojana Mc MD   [facility] ([facility]    [address] [address]   [phone]              Who to contact     If you have questions or need follow up information about today's clinic visit or your schedule please contact [facility] directly at [phone].  Normal or non-critical lab and imaging results will be communicated to you by MyChart, letter or phone within 4 business days after the clinic has received the results. If you do not hear from us within 7 days, please contact the clinic through MyChart or phone. If you have a critical or abnormal lab result, we will notify you by phone as soon as possible.  Submit refill requests through Confidex or call your pharmacy and they will forward the refill request to us. Please allow 3 business days for your refill to be completed.          Additional Information About Your Visit        MyChart Information     Confidex gives you secure access to your electronic health record. If you see a primary care provider, you can also send messages to your care team and make appointments. If you have questions, please call your primary care clinic.  If you do not have a primary care provider, please call [phone] and they will assist you.        Care EveryWhere ID     This is your Care EveryWhere ID. This could be used by other organizations to access your [facility]  "records  [id number]        Your Vitals Were     Pulse Temperature Respirations Height Last Period BMI (Body Mass Index)    80 99.4  F (37.4  C) (Tympanic) 18 5' 5.75\" (1.67 m) 01/09/2018 22.28 kg/m2       Blood Pressure from Last 3 Encounters:   04/06/18 135/71   03/06/18 137/83   02/19/18 120/75    Weight from Last 3 Encounters:   04/06/18 137 lb (62.1 kg)   03/06/18 134 lb 3.2 oz (60.9 kg)              Today, you had the following     No orders found for display       Primary Care Provider Fax #    Physician No Ref-Primary [phone]       No address on file        Equal Access to Services     ANDRÉS HARDIN : Mary Beth Bella, wamarcela chou, qaybta kaalmada tammiyadimitri, lindsay eagle . So [facility] [phone].    ATENCIÓN: Si habla español, tiene a noland disposición servicios gratuitos de asistencia lingüística. Llame al [phone].    We comply with applicable federal civil rights laws and [place] laws. We do not discriminate on the basis of race, color, national origin, age, disability, sex, sexual orientation, or gender identity.            Thank you!     Thank you for choosing [facility]  for your care. Our goal is always to provide you with excellent care. Hearing back from our patients is one way we can continue to improve our services. Please take a few minutes to complete the written survey that you may receive in the mail after your visit with us. Thank you!             Your Updated Medication List - Protect others around you: Learn how to safely use, store and throw away your medicines at www.disposemymeds.org.          This list is accurate as of 4/6/18  3:58 PM.  Always use your most recent med list.                   Brand Name Dispense Instructions for use Diagnosis    EPINEPHrine 0.3 MG/0.3ML injection 2-pack    EPIPEN/ADRENACLICK/or ANY BX GENERIC EQUIV     Inject 0.3 mg into the muscle        nitroFURantoin (macrocrystal-monohydrate) 100 MG " capsule    MACROBID    14 capsule    Take 1 capsule (100 mg) by mouth 2 times daily    Dysuria       PRENATAL ADULT GUMMY/DHA/FA PO      Take 2 chew tab by mouth

## 2018-05-04 ENCOUNTER — PRENATAL OFFICE VISIT (OUTPATIENT)
Dept: OBGYN | Facility: CLINIC | Age: 33
End: 2018-05-04
Payer: COMMERCIAL

## 2018-05-04 VITALS
SYSTOLIC BLOOD PRESSURE: 117 MMHG | DIASTOLIC BLOOD PRESSURE: 68 MMHG | WEIGHT: 139 LBS | TEMPERATURE: 98.1 F | HEART RATE: 85 BPM | HEIGHT: 66 IN | RESPIRATION RATE: 18 BRPM | BODY MASS INDEX: 22.34 KG/M2

## 2018-05-04 DIAGNOSIS — Z3A.16 16 WEEKS GESTATION OF PREGNANCY: Primary | ICD-10-CM

## 2018-05-04 PROCEDURE — 99207 ZZC PRENATAL VISIT: CPT | Performed by: OBSTETRICS & GYNECOLOGY

## 2018-05-04 NOTE — NURSING NOTE
"Initial /68 (BP Location: Right arm, Patient Position: Chair, Cuff Size: Adult Small)  Pulse 85  Temp 98.1  F (36.7  C) (Tympanic)  Resp 18  Ht 5' 5.75\" (1.67 m)  Wt 139 lb (63 kg)  LMP 01/09/2018  BMI 22.61 kg/m2 Estimated body mass index is 22.61 kg/(m^2) as calculated from the following:    Height as of this encounter: 5' 5.75\" (1.67 m).    Weight as of this encounter: 139 lb (63 kg). .      "

## 2018-05-04 NOTE — PROGRESS NOTES
"CC: Here for routine prenatal visit @ 16w3d   HPI:  Doing well; no complaints    PE: /68 (BP Location: Right arm, Patient Position: Chair, Cuff Size: Adult Small)  Pulse 85  Temp 98.1  F (36.7  C) (Tympanic)  Resp 18  Ht 5' 5.75\" (1.67 m)  Wt 139 lb (63 kg)  LMP 01/09/2018  BMI 22.61 kg/m2   See OB flowsheet      A:  1. 16 weeks gestation of pregnancy    - US OB > 14 Weeks Complete Single; Future      Routine prenatal care  RTC 4 weeks.      Yojana Mc M.D.     "

## 2018-05-07 ENCOUNTER — MYC MEDICAL ADVICE (OUTPATIENT)
Dept: DERMATOLOGY | Facility: CLINIC | Age: 33
End: 2018-05-07

## 2018-05-11 ENCOUNTER — OFFICE VISIT (OUTPATIENT)
Dept: DERMATOLOGY | Facility: CLINIC | Age: 33
End: 2018-05-11
Payer: COMMERCIAL

## 2018-05-11 VITALS — SYSTOLIC BLOOD PRESSURE: 123 MMHG | OXYGEN SATURATION: 100 % | DIASTOLIC BLOOD PRESSURE: 83 MMHG | HEART RATE: 78 BPM

## 2018-05-11 DIAGNOSIS — D48.5 NEOPLASM OF UNCERTAIN BEHAVIOR OF SKIN: Primary | ICD-10-CM

## 2018-05-11 PROCEDURE — 88342 IMHCHEM/IMCYTCHM 1ST ANTB: CPT | Mod: TC | Performed by: PHYSICIAN ASSISTANT

## 2018-05-11 PROCEDURE — 88305 TISSUE EXAM BY PATHOLOGIST: CPT | Mod: TC | Performed by: PHYSICIAN ASSISTANT

## 2018-05-11 PROCEDURE — 99213 OFFICE O/P EST LOW 20 MIN: CPT | Mod: 25 | Performed by: PHYSICIAN ASSISTANT

## 2018-05-11 PROCEDURE — 11100 HC BIOPSY SKIN/SUBQ/MUC MEM, SINGLE LESION: CPT | Performed by: PHYSICIAN ASSISTANT

## 2018-05-11 NOTE — MR AVS SNAPSHOT
After Visit Summary   5/11/2018    Venessa Guillen    MRN: [id number]           Patient Information     Date Of Birth          1985        Visit Information        Provider Department      5/11/2018 8:00 AM Karine Anton PA-C [facility]        Care Instructions    Suture removal in 7-10 days.      Wound Care Instructions     FOR SUPERFICIAL WOUNDS     [facility] [phone]    [facility] [phone]                       AFTER 24 HOURS YOU SHOULD REMOVE THE BANDAGE AND BEGIN DAILY DRESSING CHANGES AS FOLLOWS:     1) Remove Dressing.     2) Clean and dry the area with tap water using a Q-tip or sterile gauze pad.     3) Apply Vaseline, Aquaphor, Polysporin ointment or Bacitracin ointment over entire wound.  Do NOT use Neosporin ointment.     4) Cover the wound with a band-aid, or a sterile non-stick gauze pad and micropore paper tape      REPEAT THESE INSTRUCTIONS AT LEAST ONCE A DAY UNTIL THE WOUND HAS COMPLETELY HEALED.    It is an old wives tale that a wound heals better when it is exposed to air and allowed to dry out. The wound will heal faster with a better cosmetic result if it is kept moist with ointment and covered with a bandage.    **Do not let the wound dry out.**      Supplies Needed:      *Cotton tipped applicators (Q-tips)    *Polysporin Ointment or Bacitracin Ointment (NOT NEOSPORIN)    *Band-aids or non-stick gauze pads and micropore paper tape.      PATIENT INFORMATION:    During the healing process you will notice a number of changes. All wounds develop a small halo of redness surrounding the wound.  This means healing is occurring. Severe itching with extensive redness usually indicates sensitivity to the ointment or bandage tape used to dress the wound.  You should call our office if this develops.      Swelling  and/or discoloration around your surgical site is common, particularly when performed around the eye.    All wounds  normally drain.  The larger the wound the more drainage there will be.  After 7-10 days, you will notice the wound beginning to shrink and new skin will begin to grow.  The wound is healed when you can see skin has formed over the entire area.  A healed wound has a healthy, shiny look to the surface and is red to dark pink in color to normalize.  Wounds may take approximately 4-6 weeks to heal.  Larger wounds may take 6-8 weeks.  After the wound is healed you may discontinue dressing changes.    You may experience a sensation of tightness as your wound heals. This is normal and will gradually subside.    Your healed wound may be sensitive to temperature changes. This sensitivity improves with time, but if you re having a lot of discomfort, try to avoid temperature extremes.    Patients frequently experience itching after their wound appears to have healed because of the continue healing under the skin.  Plain Vaseline will help relieve the itching.        POSSIBLE COMPLICATIONS    BLEEDIN. Leave the bandage in place.  2. Use tightly rolled up gauze or a cloth to apply direct pressure over the bandage for 30  minutes.  3. Reapply pressure for an additional 30 minutes if necessary  4. Use additional gauze and tape to maintain pressure once the bleeding has stopped.            Follow-ups after your visit        Your next 10 appointments already scheduled     2018  8:00 AM CDT   US OB > 14 WEEKS COMPLETE SINGLE [facility]    [address] [address] [phone]           Please bring a list of your medicines (including vitamins, minerals and over-the-counter drugs). Also, tell your doctor about any allergies you may have. Wear comfortable clothes and leave your valuables at home.  If you re less than 20 weeks drink four 8-ounce glasses of fluid an hour before your exam. If you need to empty your bladder before your exam, try  to release only a little urine. Then, drink another glass of fluid.  You may have up to two family members in the exam room. If you bring a small child, an adult must be there to care for him or her.  Please call the Imaging Department at your exam site with any questions.            Jun 01, 2018  9:15 AM CDT   ESTABLISHED PRENATAL with Elena Malone MD   [facility]    [address] [address] [phone]              Who to contact     If you have questions or need follow up information about today's clinic visit or your schedule please contact [facility] directly at [phone].  Normal or non-critical lab and imaging results will be communicated to you by MyChart, letter or phone within 4 business days after the clinic has received the results. If you do not hear from us within 7 days, please contact the clinic through MyChart or phone. If you have a critical or abnormal lab result, we will notify you by phone as soon as possible.  Submit refill requests through Simple Emotion or call your pharmacy and they will forward the refill request to us. Please allow 3 business days for your refill to be completed.          Additional Information About Your Visit        Simple Emotion Information     [employer] gives you secure access to your electronic health record. If you see a primary care provider, you can also send messages to your care team and make appointments. If you have questions, please call your primary care clinic.  If you do not have a primary care provider, please call [phone] and they will assist you.        Care EveryWhere ID     This is your Care EveryWhere ID. This could be used by other organizations to access your [facility] medical records  [id number]        Your Vitals Were     Pulse Last Period Pulse Oximetry             78 01/09/2018 100%          Blood Pressure from Last 3 Encounters:   05/11/18 123/83    05/04/18 117/68   04/06/18 135/71    Weight from Last 3 Encounters:   05/04/18 63 kg (139 lb)   04/06/18 62.1 kg (137 lb)   03/06/18 60.9 kg (134 lb 3.2 oz)              Today, you had the following     No orders found for display       Primary Care Provider Fax #    Physician No Ref-Primary [phone]       No address on file        Equal Access to Services     ANDRÉS HARDIN : Hadii hermann ku ireneo Sojyotiali, waaxda luqadaha, qaybta kaalmada adeegyada, lindsay reilly gemmasaloni bauerwilliejose eagle . So [facility] [phone].    ATENCIÓN: Si habla español, tiene a noland disposición servicios gratuitos de asistencia lingüística. Faizan al [phone].    We comply with applicable federal civil rights laws and [place] laws. We do not discriminate on the basis of race, color, national origin, age, disability, sex, sexual orientation, or gender identity.            Thank you!     Thank you for choosing [facility]  for your care. Our goal is always to provide you with excellent care. Hearing back from our patients is one way we can continue to improve our services. Please take a few minutes to complete the written survey that you may receive in the mail after your visit with us. Thank you!             Your Updated Medication List - Protect others around you: Learn how to safely use, store and throw away your medicines at www.disposemymeds.org.          This list is accurate as of 5/11/18  8:17 AM.  Always use your most recent med list.                   Brand Name Dispense Instructions for use Diagnosis    EPINEPHrine 0.3 MG/0.3ML injection 2-pack    EPIPEN/ADRENACLICK/or ANY BX GENERIC EQUIV     Inject 0.3 mg into the muscle        PRENATAL ADULT GUMMY/DHA/FA PO      Take 2 chew tab by mouth

## 2018-05-11 NOTE — PATIENT INSTRUCTIONS
Suture removal in 7-10 days.      Wound Care Instructions     FOR SUPERFICIAL WOUNDS     [facility] [phone]    [facility] [phone]                       AFTER 24 HOURS YOU SHOULD REMOVE THE BANDAGE AND BEGIN DAILY DRESSING CHANGES AS FOLLOWS:     1) Remove Dressing.     2) Clean and dry the area with tap water using a Q-tip or sterile gauze pad.     3) Apply Vaseline, Aquaphor, Polysporin ointment or Bacitracin ointment over entire wound.  Do NOT use Neosporin ointment.     4) Cover the wound with a band-aid, or a sterile non-stick gauze pad and micropore paper tape      REPEAT THESE INSTRUCTIONS AT LEAST ONCE A DAY UNTIL THE WOUND HAS COMPLETELY HEALED.    It is an old wives tale that a wound heals better when it is exposed to air and allowed to dry out. The wound will heal faster with a better cosmetic result if it is kept moist with ointment and covered with a bandage.    **Do not let the wound dry out.**      Supplies Needed:      *Cotton tipped applicators (Q-tips)    *Polysporin Ointment or Bacitracin Ointment (NOT NEOSPORIN)    *Band-aids or non-stick gauze pads and micropore paper tape.      PATIENT INFORMATION:    During the healing process you will notice a number of changes. All wounds develop a small halo of redness surrounding the wound.  This means healing is occurring. Severe itching with extensive redness usually indicates sensitivity to the ointment or bandage tape used to dress the wound.  You should call our office if this develops.      Swelling  and/or discoloration around your surgical site is common, particularly when performed around the eye.    All wounds normally drain.  The larger the wound the more drainage there will be.  After 7-10 days, you will notice the wound beginning to shrink and new skin will begin to grow.  The wound is healed when you can see skin has formed over the entire area.  A healed wound has a healthy, shiny look to the surface and  is red to dark pink in color to normalize.  Wounds may take approximately 4-6 weeks to heal.  Larger wounds may take 6-8 weeks.  After the wound is healed you may discontinue dressing changes.    You may experience a sensation of tightness as your wound heals. This is normal and will gradually subside.    Your healed wound may be sensitive to temperature changes. This sensitivity improves with time, but if you re having a lot of discomfort, try to avoid temperature extremes.    Patients frequently experience itching after their wound appears to have healed because of the continue healing under the skin.  Plain Vaseline will help relieve the itching.        POSSIBLE COMPLICATIONS    BLEEDIN. Leave the bandage in place.  2. Use tightly rolled up gauze or a cloth to apply direct pressure over the bandage for 30  minutes.  3. Reapply pressure for an additional 30 minutes if necessary  4. Use additional gauze and tape to maintain pressure once the bleeding has stopped.

## 2018-05-11 NOTE — PROGRESS NOTES
Venessa Guillen is a 32 year old year old female patient here today for mole on right mid back.  Patient had mole biopsied in February which came back as moderately atypical. She reports that she just recently noticed brown spot returning. Patient is 17 weeks pregnant. Patient has no other skin complaints today.  Remainder of the HPI, Meds, PMH, Allergies, FH, and SH was reviewed in chart.    Pertinent Hx:  Atypical nevus   Past Medical History:   Diagnosis Date     Chickenpox        Past Surgical History:   Procedure Laterality Date     BIOPSY OF SKIN LESION      mole removal     COLONOSCOPY      x2     MOUTH SURGERY      wisdom teeth        Family History   Problem Relation Age of Onset     Substance Abuse Father      recovered A&D     Hypertension Maternal Grandmother      Prostate Cancer Maternal Grandfather      Skin Cancer Maternal Grandfather      BCC     DIABETES Maternal Grandfather      Heart Surgery Maternal Grandfather      bypass     Lung Cancer Paternal Grandmother      DIABETES Paternal Grandmother      Heart Surgery Paternal Grandmother      bypass     Prostate Cancer Paternal Grandfather        Social History     Social History     Marital status:      Spouse name: N/A     Number of children: N/A     Years of education: N/A     Occupational History     Not on file.     Social History Main Topics     Smoking status: Never Smoker     Smokeless tobacco: Never Used     Alcohol use Yes      Comment: occas- quit with pregnancy     Drug use: No     Sexual activity: Yes     Partners: Male      Comment:  since 2016     Other Topics Concern     Not on file     Social History Narrative       Outpatient Encounter Prescriptions as of 5/11/2018   Medication Sig Dispense Refill     Prenatal MV & Min w/FA-DHA (PRENATAL ADULT GUMMY/DHA/FA PO) Take 2 chew tab by mouth       EPINEPHrine (EPIPEN/ADRENACLICK/OR ANY BX GENERIC EQUIV) 0.3 MG/0.3ML injection 2-pack Inject 0.3 mg into the muscle        [DISCONTINUED] nitroFURantoin, macrocrystal-monohydrate, (MACROBID) 100 MG capsule Take 1 capsule (100 mg) by mouth 2 times daily (Patient not taking: Reported on 5/4/2018) 14 capsule 0     No facility-administered encounter medications on file as of 5/11/2018.              Review Of Systems  Skin: As above  Eyes: negative  Ears/Nose/Throat: negative  Respiratory: No shortness of breath, dyspnea on exertion, cough, or hemoptysis  Cardiovascular: negative  Gastrointestinal: negative  Genitourinary: negative  Musculoskeletal: negative  Neurologic: negative  Psychiatric: negative  Hematologic/Lymphatic/Immunologic: negative  Endocrine: negative      O:   NAD, WDWN, Alert & Oriented, Mood & Affect wnl, Vitals stable   Here today alone   /83  Pulse 78  LMP 01/09/2018  SpO2 100%   General appearance normal   Vitals stable   Alert, oriented and in no acute distress     0.3 brown macule on right mid back       Eyes: Conjunctivae/lids:Normal     ENT: Lips: normal    MSK:Normal    Pulm: Breathing Normal    Neuro/Psych: Orientation:Normal; Mood/Affect:Normal  A/P:  1. R/O recurrent atypical nevus on right mid back   4 mm Punch BIOPSY SENT OUT:  After consent, anesthesia with LEC and prep, punch excision performed, 2, 4-0 Ethilon suture were used to close wound, and specimen sent out for permanent section histology.  No complications and routine wound care. Patient told to call our office in 1-2 weeks for result. Remove sutures in 7-10 days.

## 2018-05-11 NOTE — NURSING NOTE
"Initial /83  Pulse 78  LMP 01/09/2018  SpO2 100% Estimated body mass index is 22.61 kg/(m^2) as calculated from the following:    Height as of 5/4/18: 1.67 m (5' 5.75\").    Weight as of 5/4/18: 63 kg (139 lb).     Che Sharma LPN    "

## 2018-05-11 NOTE — LETTER
5/11/2018         RE: Venessa Guillen  [address]  [place] [address]        Dear Colleague,    Thank you for referring your patient, Venessa Guillen, to the [facility]. Please see a copy of my visit note below.    Venessa Guillen is a 32 year old year old female patient here today for mole on right mid back.  Patient had mole biopsied in February which came back as moderately atypical. She reports that she just recently noticed brown spot returning. Patient is 17 weeks pregnant. Patient has no other skin complaints today.  Remainder of the HPI, Meds, PMH, Allergies, FH, and SH was reviewed in chart.    Pertinent Hx:  Atypical nevus   Past Medical History:   Diagnosis Date     Chickenpox        Past Surgical History:   Procedure Laterality Date     BIOPSY OF SKIN LESION      mole removal     COLONOSCOPY      x2     MOUTH SURGERY      wisdom teeth        Family History   Problem Relation Age of Onset     Substance Abuse Father      recovered A&D     Hypertension Maternal Grandmother      Prostate Cancer Maternal Grandfather      Skin Cancer Maternal Grandfather      BCC     DIABETES Maternal Grandfather      Heart Surgery Maternal Grandfather      bypass     Lung Cancer Paternal Grandmother      DIABETES Paternal Grandmother      Heart Surgery Paternal Grandmother      bypass     Prostate Cancer Paternal Grandfather        Social History     Social History     Marital status:      Spouse name: N/A     Number of children: N/A     Years of education: N/A     Occupational History     Not on file.     Social History Main Topics     Smoking status: Never Smoker     Smokeless tobacco: Never Used     Alcohol use Yes      Comment: occas- quit with pregnancy     Drug use: No     Sexual activity: Yes     Partners: Male      Comment:  since 2016     Other Topics Concern     Not on file     Social History Narrative       Outpatient Encounter Prescriptions as of 5/11/2018   Medication Sig Dispense  Refill     Prenatal MV & Min w/FA-DHA (PRENATAL ADULT GUMMY/DHA/FA PO) Take 2 chew tab by mouth       EPINEPHrine (EPIPEN/ADRENACLICK/OR ANY BX GENERIC EQUIV) 0.3 MG/0.3ML injection 2-pack Inject 0.3 mg into the muscle       [DISCONTINUED] nitroFURantoin, macrocrystal-monohydrate, (MACROBID) 100 MG capsule Take 1 capsule (100 mg) by mouth 2 times daily (Patient not taking: Reported on 5/4/2018) 14 capsule 0     No facility-administered encounter medications on file as of 5/11/2018.              Review Of Systems  Skin: As above  Eyes: negative  Ears/Nose/Throat: negative  Respiratory: No shortness of breath, dyspnea on exertion, cough, or hemoptysis  Cardiovascular: negative  Gastrointestinal: negative  Genitourinary: negative  Musculoskeletal: negative  Neurologic: negative  Psychiatric: negative  Hematologic/Lymphatic/Immunologic: negative  Endocrine: negative      O:   NAD, WDWN, Alert & Oriented, Mood & Affect wnl, Vitals stable   Here today alone   /83  Pulse 78  LMP 01/09/2018  SpO2 100%   General appearance normal   Vitals stable   Alert, oriented and in no acute distress     0.3 brown macule on right mid back       Eyes: Conjunctivae/lids:Normal     ENT: Lips: normal    MSK:Normal    Pulm: Breathing Normal    Neuro/Psych: Orientation:Normal; Mood/Affect:Normal  A/P:  1. R/O recurrent atypical nevus on right mid back   4 mm Punch BIOPSY SENT OUT:  After consent, anesthesia with LEC and prep, punch excision performed, 2, 4-0 Ethilon suture were used to close wound, and specimen sent out for permanent section histology.  No complications and routine wound care. Patient told to call our office in 1-2 weeks for result. Remove sutures in 7-10 days.       Again, thank you for allowing me to participate in the care of your patient.        Sincerely,        Karine Seay PA-C

## 2018-05-15 ENCOUNTER — MYC MEDICAL ADVICE (OUTPATIENT)
Dept: OBGYN | Facility: CLINIC | Age: 33
End: 2018-05-15

## 2018-05-15 DIAGNOSIS — R30.0 DYSURIA: Primary | ICD-10-CM

## 2018-05-16 DIAGNOSIS — R30.0 DYSURIA: Primary | ICD-10-CM

## 2018-05-16 DIAGNOSIS — R82.90 NONSPECIFIC FINDING ON EXAMINATION OF URINE: ICD-10-CM

## 2018-05-16 LAB

## 2018-05-16 PROCEDURE — 87186 SC STD MICRODIL/AGAR DIL: CPT | Performed by: OBSTETRICS & GYNECOLOGY

## 2018-05-16 PROCEDURE — 87088 URINE BACTERIA CULTURE: CPT | Performed by: OBSTETRICS & GYNECOLOGY

## 2018-05-16 PROCEDURE — 87086 URINE CULTURE/COLONY COUNT: CPT | Performed by: OBSTETRICS & GYNECOLOGY

## 2018-05-16 PROCEDURE — 81001 URINALYSIS AUTO W/SCOPE: CPT | Performed by: OBSTETRICS & GYNECOLOGY

## 2018-05-16 RX ORDER — NITROFURANTOIN 25; 75 MG/1; MG/1
100 CAPSULE ORAL 2 TIMES DAILY
Qty: 20 CAPSULE | Refills: 0 | Status: SHIPPED | OUTPATIENT
Start: 2018-05-16 | End: 2018-05-26

## 2018-05-16 NOTE — PROGRESS NOTES
THis looks like a UTI (bladder infection)  I have sent a prescription for 10 days of  MacroBID (twice daily) to the pharmacy here in [place]  Rodolfo Lundy

## 2018-05-17 LAB — COPATH REPORT: NORMAL

## 2018-05-18 LAB
BACTERIA SPEC CULT: ABNORMAL
Lab: ABNORMAL
SPECIMEN SOURCE: ABNORMAL

## 2018-05-21 ENCOUNTER — ALLIED HEALTH/NURSE VISIT (OUTPATIENT)
Dept: DERMATOLOGY | Facility: CLINIC | Age: 33
End: 2018-05-21
Payer: COMMERCIAL

## 2018-05-21 DIAGNOSIS — Z48.02 ATTENTION TO DRESSINGS AND SUTURES: Primary | ICD-10-CM

## 2018-05-21 DIAGNOSIS — Z48.00 ATTENTION TO DRESSINGS AND SUTURES: Primary | ICD-10-CM

## 2018-05-21 PROCEDURE — 99207 ZZC NO CHARGE NURSE ONLY: CPT

## 2018-05-21 NOTE — PROGRESS NOTES
Pt returned to clinic for post surgery 1 week follow up bandage change. Pt has no complaints, denies pain. Cut two stitches from back. Site is healing and wound edges approximating well.     Advised to watch for signs/sx of infection; spreading redness, drainage, odor, fever. Call or report promptly to clinic. Pt given written instructions and informed to rtc as needed. Patient verbalized understanding.

## 2018-05-21 NOTE — MR AVS SNAPSHOT
After Visit Summary   5/21/2018    Venessa Guillen    MRN: [id number]           Patient Information     Date Of Birth          1985        Visit Information        Provider Department      [facility]        Today's Diagnoses     Attention to dressings and sutures    -  1       Follow-ups after your visit        Your next 10 appointments already scheduled     Jun 01, 2018  8:00 AM CDT   US OB > 14 WEEKS COMPLETE SINGLE with [place]   [facility] ([facility])    [address] [facility] [address] [phone]           Please bring a list of your medicines (including vitamins, minerals and over-the-counter drugs). Also, tell your doctor about any allergies you may have. Wear comfortable clothes and leave your valuables at home.  If you re less than 20 weeks drink four 8-ounce glasses of fluid an hour before your exam. If you need to empty your bladder before your exam, try to release only a little urine. Then, drink another glass of fluid.  You may have up to two family members in the exam room. If you bring a small child, an adult must be there to care for him or her.  Please call the Imaging Department at your exam site with any questions.              Who to contact     If you have questions or need follow up information about today's clinic visit or your schedule please contact [facility] directly at [phone].  Normal or non-critical lab and imaging results will be communicated to you by MyChart, letter or phone within 4 business days after the clinic has received the results. If you do not hear from us within 7 days, please contact the clinic through MyChart or phone. If you have a critical or abnormal lab result, we will notify you by phone as soon as possible.  Submit refill requests through [employer] or call your pharmacy and they will forward the refill request to us. Please allow 3 business  days for your refill to be completed.          Additional Information About Your Visit        MyChart Information     TicTacTihart gives you secure access to your electronic health record. If you see a primary care provider, you can also send messages to your care team and make appointments. If you have questions, please call your primary care clinic.  If you do not have a primary care provider, please call [phone] and they will assist you.        Care EveryWhere ID     This is your Care EveryWhere ID. This could be used by other organizations to access your [facility] medical records  [id number]        Your Vitals Were     Last Period                   01/09/2018            Blood Pressure from Last 3 Encounters:   05/11/18 123/83   05/04/18 117/68   04/06/18 135/71    Weight from Last 3 Encounters:   05/04/18 63 kg (139 lb)   04/06/18 62.1 kg (137 lb)   03/06/18 60.9 kg (134 lb 3.2 oz)              Today, you had the following     No orders found for display       Primary Care Provider Fax #    Physician No Ref-Primary [phone]       No address on file        Equal Access to Services     [facility]: Hadii hermann Bella, waaxda luqadaha, qaybta kaalmadimitri alba, lindsay eagle . So [facility] [phone].    ATENCIÓN: Si habla español, tiene a noland disposición servicios gratuitos de asistencia lingüística. Llame al [phone].    We comply with applicable federal civil rights laws and [place] laws. We do not discriminate on the basis of race, color, national origin, age, disability, sex, sexual orientation, or gender identity.            Thank you!     Thank you for choosing [facility]  for your care. Our goal is always to provide you with excellent care. Hearing back from our patients is one way we can continue to improve our services. Please take a few minutes to complete the written survey that you may receive in the mail after your visit with us. Thank  you!             Your Updated Medication List - Protect others around you: Learn how to safely use, store and throw away your medicines at www.disposemymeds.org.          This list is accurate as of 5/21/18  2:42 PM.  Always use your most recent med list.                   Brand Name Dispense Instructions for use Diagnosis    EPINEPHrine 0.3 MG/0.3ML injection 2-pack    EPIPEN/ADRENACLICK/or ANY BX GENERIC EQUIV     Inject 0.3 mg into the muscle        nitroFURantoin (macrocrystal-monohydrate) 100 MG capsule    MACROBID    20 capsule    Take 1 capsule (100 mg) by mouth 2 times daily for 10 days    Dysuria       PRENATAL ADULT GUMMY/DHA/FA PO      Take 2 chew tab by mouth

## 2018-06-04 ENCOUNTER — PRENATAL OFFICE VISIT (OUTPATIENT)
Dept: OBGYN | Facility: CLINIC | Age: 33
End: 2018-06-04
Payer: COMMERCIAL

## 2018-06-04 ENCOUNTER — HOSPITAL ENCOUNTER (OUTPATIENT)
Dept: ULTRASOUND IMAGING | Facility: CLINIC | Age: 33
Discharge: HOME | End: 2018-06-04
Attending: OBSTETRICS & GYNECOLOGY | Admitting: OBSTETRICS & GYNECOLOGY
Payer: COMMERCIAL

## 2018-06-04 VITALS
SYSTOLIC BLOOD PRESSURE: 119 MMHG | DIASTOLIC BLOOD PRESSURE: 77 MMHG | HEIGHT: 66 IN | TEMPERATURE: 98.3 F | HEART RATE: 68 BPM | BODY MASS INDEX: 22.5 KG/M2 | RESPIRATION RATE: 16 BRPM | WEIGHT: 140 LBS

## 2018-06-04 DIAGNOSIS — Z34.02 ENCOUNTER FOR PRENATAL CARE IN SECOND TRIMESTER OF FIRST PREGNANCY: Primary | ICD-10-CM

## 2018-06-04 DIAGNOSIS — Z3A.16 16 WEEKS GESTATION OF PREGNANCY: ICD-10-CM

## 2018-06-04 PROCEDURE — 76805 OB US >/= 14 WKS SNGL FETUS: CPT

## 2018-06-04 PROCEDURE — 99207 ZZC PRENATAL VISIT: CPT | Performed by: OBSTETRICS & GYNECOLOGY

## 2018-06-04 NOTE — NURSING NOTE
"Initial /77 (BP Location: Left arm, Patient Position: Chair, Cuff Size: Adult Regular)  Pulse 68  Temp 98.3  F (36.8  C) (Tympanic)  Resp 16  Ht 5' 5.75\" (1.67 m)  Wt 140 lb (63.5 kg)  LMP 01/09/2018  Breastfeeding? No  BMI 22.77 kg/m2 Estimated body mass index is 22.77 kg/(m^2) as calculated from the following:    Height as of this encounter: 5' 5.75\" (1.67 m).    Weight as of this encounter: 140 lb (63.5 kg). .    Venessa Marshall, [facility]    "

## 2018-06-04 NOTE — PROGRESS NOTES
"CC: Here for routine prenatal visit @ 20w6d   HPI: + FM, no ctx, no LOF, no VB.  No complaints.     PE: /77 (BP Location: Left arm, Patient Position: Chair, Cuff Size: Adult Regular)  Pulse 68  Temp 98.3  F (36.8  C) (Tympanic)  Resp 16  Ht 5' 5.75\" (1.67 m)  Wt 140 lb (63.5 kg)  LMP 01/09/2018  Breastfeeding? No  BMI 22.77 kg/m2   See OB flowsheet    U/S WNL prelim    A/P G1 @ 20w6d normal pregnancy    1. Routine prenatal care    RTC 4 weeks.      Aniya Malik M.D.    "

## 2018-06-04 NOTE — MR AVS SNAPSHOT
After Visit Summary   6/4/2018    Venessa Guillen    MRN: [id number]           Patient Information     Date Of Birth          1985        Visit Information        Provider Department      6/4/2018 2:30 PM Aniya Malik MD [facility]        Today's Diagnoses     Encounter for prenatal care in second trimester of first pregnancy    -  1       Follow-ups after your visit        Your next 10 appointments already scheduled     Jun 27, 2018  3:15 PM CDT   LAB with [facility] ([facility])    [address] [address]   [phone]           Please do not eat 10-12 hours before your appointment if you are coming in fasting for labs on lipids, cholesterol, or glucose (sugar). This does not apply to pregnant women. Water, hot tea and black coffee (with nothing added) are okay. Do not drink other fluids, diet soda or chew gum.            Jun 27, 2018  3:30 PM CDT   ESTABLISHED PRENATAL with Rodolfo Lundy MD   [facility] ([facility])    [address] [address]   [phone]              Future tests that were ordered for you today     Open Future Orders        Priority Expected Expires Ordered    Treponema Abs w Reflex to RPR and Titer Routine  9/30/2018 6/4/2018    Glucose tolerance gest screen 1 hour Routine  9/30/2018 6/4/2018    CBC with platelets Routine  9/30/2018 6/4/2018            Who to contact     If you have questions or need follow up information about today's clinic visit or your schedule please contact [facility] directly at [phone].  Normal or non-critical lab and imaging results will be communicated to you by MyChart, letter or phone within 4 business days after the clinic has received the results. If you do not hear from us within 7 days, please contact the clinic through MyChart or phone. If you have a critical or abnormal lab result, we  "will notify you by phone as soon as possible.  Submit refill requests through [employer]iss or call your pharmacy and they will forward the refill request to us. Please allow 3 business days for your refill to be completed.          Additional Information About Your Visit        Tropic Networks[employer] Information     [employer] gives you secure access to your electronic health record. If you see a primary care provider, you can also send messages to your care team and make appointments. If you have questions, please call your primary care clinic.  If you do not have a primary care provider, please call [phone] and they will assist you.        Care EveryWhere ID     This is your Care EveryWhere ID. This could be used by other organizations to access your [facility] medical records  [id number]        Your Vitals Were     Pulse Temperature Respirations Height Last Period Breastfeeding?    68 98.3  F (36.8  C) (Tympanic) 16 5' 5.75\" (1.67 m) 01/09/2018 No    BMI (Body Mass Index)                   22.77 kg/m2            Blood Pressure from Last 3 Encounters:   06/04/18 119/77   05/11/18 123/83   05/04/18 117/68    Weight from Last 3 Encounters:   06/04/18 140 lb (63.5 kg)   05/04/18 139 lb (63 kg)   04/06/18 137 lb (62.1 kg)               Primary Care Provider Fax #    Physician No Ref-Primary [phone]       No address on file        Equal Access to Services     [facility] : Hadii hermann betts hadasho Sojyotiali, waaxda luqadaha, qaybta kaalmada anjali, lindsay eagle . So [facility] [phone].    ATENCIÓN: Si habla español, tiene a noland disposición servicios gratuitos de asistencia lingüística. Faizan al [phone].    We comply with applicable federal civil rights laws and [place] laws. We do not discriminate on the basis of race, color, national origin, age, disability, sex, sexual orientation, or gender identity.            Thank you!     Thank you for choosing [facility]  for your care. Our goal " is always to provide you with excellent care. Hearing back from our patients is one way we can continue to improve our services. Please take a few minutes to complete the written survey that you may receive in the mail after your visit with us. Thank you!             Your Updated Medication List - Protect others around you: Learn how to safely use, store and throw away your medicines at www.disposemymeds.org.          This list is accurate as of 6/4/18  2:42 PM.  Always use your most recent med list.                   Brand Name Dispense Instructions for use Diagnosis    EPINEPHrine 0.3 MG/0.3ML injection 2-pack    EPIPEN/ADRENACLICK/or ANY BX GENERIC EQUIV     Inject 0.3 mg into the muscle        PRENATAL ADULT GUMMY/DHA/FA PO      Take 2 chew tab by mouth

## 2018-06-05 DIAGNOSIS — Z34.02 ENCOUNTER FOR PRENATAL CARE IN SECOND TRIMESTER OF FIRST PREGNANCY: Primary | ICD-10-CM

## 2018-06-05 NOTE — PROGRESS NOTES
Call to pt to notify of below.  Unable to reach.  Left message for pt to call back     Rosalva Lin   [facility]  RN

## 2018-06-18 ENCOUNTER — HOSPITAL ENCOUNTER (OUTPATIENT)
Dept: ULTRASOUND IMAGING | Facility: CLINIC | Age: 33
End: 2018-06-18
Attending: OBSTETRICS & GYNECOLOGY
Payer: COMMERCIAL

## 2018-06-18 DIAGNOSIS — Z34.02 ENCOUNTER FOR PRENATAL CARE IN SECOND TRIMESTER OF FIRST PREGNANCY: ICD-10-CM

## 2018-06-18 PROCEDURE — 76815 OB US LIMITED FETUS(S): CPT

## 2018-06-27 ENCOUNTER — PRENATAL OFFICE VISIT (OUTPATIENT)
Dept: OBGYN | Facility: CLINIC | Age: 33
End: 2018-06-27
Payer: COMMERCIAL

## 2018-06-27 VITALS
BODY MASS INDEX: 23.43 KG/M2 | HEIGHT: 66 IN | DIASTOLIC BLOOD PRESSURE: 66 MMHG | TEMPERATURE: 97.8 F | SYSTOLIC BLOOD PRESSURE: 109 MMHG | RESPIRATION RATE: 16 BRPM | HEART RATE: 71 BPM | WEIGHT: 145.8 LBS

## 2018-06-27 DIAGNOSIS — Z34.02 ENCOUNTER FOR PRENATAL CARE IN SECOND TRIMESTER OF FIRST PREGNANCY: ICD-10-CM

## 2018-06-27 DIAGNOSIS — Z34.02 ENCOUNTER FOR PRENATAL CARE IN SECOND TRIMESTER OF FIRST PREGNANCY: Primary | ICD-10-CM

## 2018-06-27 LAB
ERYTHROCYTE [DISTWIDTH] IN BLOOD BY AUTOMATED COUNT: 13.7 % (ref 10–15)
GLUCOSE 1H P 50 G GLC PO SERPL-MCNC: 63 MG/DL (ref 60–129)
HCT VFR BLD AUTO: 38.4 % (ref 35–47)
HGB BLD-MCNC: 12.8 G/DL (ref 11.7–15.7)
MCH RBC QN AUTO: 32.1 PG (ref 26.5–33)
MCHC RBC AUTO-ENTMCNC: 33.3 G/DL (ref 31.5–36.5)
MCV RBC AUTO: 96 FL (ref 78–100)
PLATELET # BLD AUTO: 200 10E9/L (ref 150–450)
RBC # BLD AUTO: 3.99 10E12/L (ref 3.8–5.2)
WBC # BLD AUTO: 10.3 10E9/L (ref 4–11)

## 2018-06-27 PROCEDURE — 82950 GLUCOSE TEST: CPT | Performed by: OBSTETRICS & GYNECOLOGY

## 2018-06-27 PROCEDURE — 36415 COLL VENOUS BLD VENIPUNCTURE: CPT | Performed by: OBSTETRICS & GYNECOLOGY

## 2018-06-27 PROCEDURE — 99207 ZZC PRENATAL VISIT: CPT | Performed by: OBSTETRICS & GYNECOLOGY

## 2018-06-27 PROCEDURE — 86780 TREPONEMA PALLIDUM: CPT | Performed by: OBSTETRICS & GYNECOLOGY

## 2018-06-27 PROCEDURE — 85027 COMPLETE CBC AUTOMATED: CPT | Performed by: OBSTETRICS & GYNECOLOGY

## 2018-06-27 NOTE — PROGRESS NOTES
"CC: prenatal  S: no lof/vb/dc.  Good fm.    /66 (BP Location: Right arm, Patient Position: Chair, Cuff Size: Adult Regular)  Pulse 71  Temp 97.8  F (36.6  C) (Tympanic)  Resp 16  Ht 5' 5.75\" (1.67 m)  Wt 145 lb 12.8 oz (66.1 kg)  LMP 01/09/2018  BMI 23.71 kg/m2   IMPRESSION:    1. Single live intrauterine pregnancy with a small amount of fluid  again seen within the endocervical canal, not significantly changed.     A/p routine precautions, US reviewed, stable fluid and reassuring cervix length.    Labs today  F/u 4 weeks  tdap discussed and plan for next appt  Elena Malone MD    "

## 2018-06-27 NOTE — MR AVS SNAPSHOT
"              After Visit Summary   6/27/2018    Venessa Guillen    MRN: [id number]           Patient Information     Date Of Birth          1985        Visit Information        Provider Department      6/27/2018 9:00 AM Elena Malone MD [facility]        Today's Diagnoses     Encounter for prenatal care in second trimester of first pregnancy    -  1       Follow-ups after your visit        Who to contact     If you have questions or need follow up information about today's clinic visit or your schedule please contact [facility] directly at [phone].  Normal or non-critical lab and imaging results will be communicated to you by Evehart, letter or phone within 4 business days after the clinic has received the results. If you do not hear from us within 7 days, please contact the clinic through Evehart or phone. If you have a critical or abnormal lab result, we will notify you by phone as soon as possible.  Submit refill requests through [email or web address] or call your pharmacy and they will forward the refill request to us. Please allow 3 business days for your refill to be completed.          Additional Information About Your Visit        MyChart Information     [email or web address] gives you secure access to your electronic health record. If you see a primary care provider, you can also send messages to your care team and make appointments. If you have questions, please call your primary care clinic.  If you do not have a primary care provider, please call [phone] and they will assist you.        Care EveryWhere ID     This is your Care EveryWhere ID. This could be used by other organizations to access your [facility] medical records  [id number]        Your Vitals Were     Pulse Temperature Respirations Height Last Period BMI (Body Mass Index)    71 97.8  F (36.6  C) (Tympanic) 16 5' 5.75\" (1.67 m) 01/09/2018 23.71 kg/m2       Blood Pressure from Last 3 Encounters:   06/27/18 109/66 "   06/04/18 119/77   05/11/18 123/83    Weight from Last 3 Encounters:   06/27/18 145 lb 12.8 oz (66.1 kg)   06/04/18 140 lb (63.5 kg)   05/04/18 139 lb (63 kg)              Today, you had the following     No orders found for display       Primary Care Provider Fax #    Physician No Ref-Primary [phone]       No address on file        Equal Access to Services     ANDRÉS HARDIN : Hadii hermann bonillao Sojyotiali, waaxda luqadaha, qaybta kaalmada adeegyada, lindsay reilly gemmasaloni bauerwilliejose eagle . So [facility] [phone].    ATENCIÓN: Si habla español, tiene a noland disposición servicios gratuitos de asistencia lingüística. Faizan al [phone].    We comply with applicable federal civil rights laws and [place] laws. We do not discriminate on the basis of race, color, national origin, age, disability, sex, sexual orientation, or gender identity.            Thank you!     Thank you for choosing [facility]  for your care. Our goal is always to provide you with excellent care. Hearing back from our patients is one way we can continue to improve our services. Please take a few minutes to complete the written survey that you may receive in the mail after your visit with us. Thank you!             Your Updated Medication List - Protect others around you: Learn how to safely use, store and throw away your medicines at www.disposemymeds.org.          This list is accurate as of 6/27/18  9:23 AM.  Always use your most recent med list.                   Brand Name Dispense Instructions for use Diagnosis    EPINEPHrine 0.3 MG/0.3ML injection 2-pack    EPIPEN/ADRENACLICK/or ANY BX GENERIC EQUIV     Inject 0.3 mg into the muscle        PRENATAL ADULT GUMMY/DHA/FA PO      Take 2 chew tab by mouth

## 2018-06-28 LAB — T PALLIDUM AB SER QL: NONREACTIVE

## 2018-07-27 ENCOUNTER — PRENATAL OFFICE VISIT (OUTPATIENT)
Dept: OBGYN | Facility: CLINIC | Age: 33
End: 2018-07-27
Payer: COMMERCIAL

## 2018-07-27 VITALS
SYSTOLIC BLOOD PRESSURE: 116 MMHG | BODY MASS INDEX: 23.88 KG/M2 | RESPIRATION RATE: 16 BRPM | HEIGHT: 66 IN | TEMPERATURE: 98.6 F | WEIGHT: 148.6 LBS | HEART RATE: 77 BPM | DIASTOLIC BLOOD PRESSURE: 69 MMHG

## 2018-07-27 DIAGNOSIS — Z23 NEED FOR TDAP VACCINATION: ICD-10-CM

## 2018-07-27 DIAGNOSIS — Z34.02 ENCOUNTER FOR PRENATAL CARE IN SECOND TRIMESTER OF FIRST PREGNANCY: Primary | ICD-10-CM

## 2018-07-27 PROCEDURE — 90715 TDAP VACCINE 7 YRS/> IM: CPT | Performed by: OBSTETRICS & GYNECOLOGY

## 2018-07-27 PROCEDURE — 99207 ZZC PRENATAL VISIT: CPT | Performed by: OBSTETRICS & GYNECOLOGY

## 2018-07-27 PROCEDURE — 90471 IMMUNIZATION ADMIN: CPT | Performed by: OBSTETRICS & GYNECOLOGY

## 2018-07-27 NOTE — NURSING NOTE
Screening Questionnaire for Adult Immunization    Are you sick today?   No   Do you have allergies to medications, food, a vaccine component or latex?   No   Have you ever had a serious reaction after receiving a vaccination?   No   Do you have a long-term health problem with heart disease, lung disease, asthma, kidney disease, metabolic disease (e.g. diabetes), anemia, or other blood disorder?   No   Do you have cancer, leukemia, HIV/AIDS, or any other immune system problem?   No   In the past 3 months, have you taken medications that affect  your immune system, such as prednisone, other steroids, or anticancer drugs; drugs for the treatment of rheumatoid arthritis, Crohn s disease, or psoriasis; or have you had radiation treatments?   No   Have you had a seizure, or a brain or other nervous system problem?   No   During the past year, have you received a transfusion of blood or blood     products, or been given immune (gamma) globulin or antiviral drug?   No   For women: Are you pregnant or is there a chance you could become        pregnant during the next month?  yes   Have you received any vaccinations in the past 4 weeks?   No            Per orders of Dr. Díaz, injection of TDAP given by Venessa Marshall. Patient instructed to remain in clinic for 15 minutes afterwards, and to report any adverse reaction to me immediately.       Screening performed by Venessa Marshall on 7/27/2018 at 9:39 AM.

## 2018-07-27 NOTE — PROGRESS NOTES
"Prenatal  S: rare maty boswell when needs to go to the bathroom. No lof/vb/dc.  Good fm.  /69 (BP Location: Right arm, Patient Position: Chair, Cuff Size: Adult Regular)  Pulse 77  Temp 98.6  F (37  C) (Tympanic)  Resp 16  Ht 5' 5.75\" (1.67 m)  Wt 148 lb 9.6 oz (67.4 kg)  LMP 01/09/2018  Breastfeeding? No  BMI 24.17 kg/m2   A/P routine precautions  Position check since appears breech today  F/u 2 weeks  They are doing the birth classes  Going to [place] in a week  tdap discussed  Elena Malone MD      "

## 2018-07-27 NOTE — NURSING NOTE
"Initial /69 (BP Location: Right arm, Patient Position: Chair, Cuff Size: Adult Regular)  Pulse 77  Temp 98.6  F (37  C) (Tympanic)  Resp 16  Ht 5' 5.75\" (1.67 m)  Wt 148 lb 9.6 oz (67.4 kg)  LMP 01/09/2018  Breastfeeding? No  BMI 24.17 kg/m2 Estimated body mass index is 24.17 kg/(m^2) as calculated from the following:    Height as of this encounter: 5' 5.75\" (1.67 m).    Weight as of this encounter: 148 lb 9.6 oz (67.4 kg). .    Venessa Marshall, NATASHA    "

## 2018-07-27 NOTE — MR AVS SNAPSHOT
After Visit Summary   7/27/2018    Venessa Guillen    MRN: [id number]           Patient Information     Date Of Birth          1985        Visit Information        Provider Department      7/27/2018 9:15 AM Elena Malone MD [facility]        Today's Diagnoses     Encounter for prenatal care in second trimester of first pregnancy    -  1    Need for Tdap vaccination          Care Instructions      Kick Counts    It s normal to worry about your baby s health. One way you can know your baby s doing well is to record the baby s movements once a day. This is called a kick count. Remember to take your kick count records to all your appointments with your healthcare provider.  How to count kicks  Here are tips for counting kicks:    Choose a time when the baby is active, such as after a meal.     Sit comfortably or lie on your side.     The first time the baby moves, write down the time.     Count each movement until the baby has moved 10 times. This can take from 20 minutes to 2 hours.     Try to do it at the same time each day.  When to call your healthcare provider  Call your healthcare provider right away if you notice any of the following:    Your baby moves fewer than 10 times in 2 hours while you re doing kick counts.    Your baby moves much less often than on the days before.    You have not felt your baby move all day.  Date Last Reviewed: 12/1/2017 2000-2017 [employer]. [address] [place] [address]. All rights reserved. This information is not intended as a substitute for professional medical care. Always follow your healthcare professional's instructions.                Follow-ups after your visit        Your next 10 appointments already scheduled     Aug 10, 2018  4:00 PM CDT   [facility]    [address] [place]  "[id number]   [phone]              Who to contact     If you have questions or need follow up information about today's clinic visit or your schedule please contact [facility] directly at [phone].  Normal or non-critical lab and imaging results will be communicated to you by MyChart, letter or phone within 4 business days after the clinic has received the results. If you do not hear from us within 7 days, please contact the clinic through Digitwhizhart or phone. If you have a critical or abnormal lab result, we will notify you by phone as soon as possible.  Submit refill requests through KitchIn or call your pharmacy and they will forward the refill request to us. Please allow 3 business days for your refill to be completed.          Additional Information About Your Visit        DigitwhizharYogome Information     KitchIn gives you secure access to your electronic health record. If you see a primary care provider, you can also send messages to your care team and make appointments. If you have questions, please call your primary care clinic.  If you do not have a primary care provider, please call [phone] and they will assist you.        Care EveryWhere ID     This is your Care EveryWhere ID. This could be used by other organizations to access your [facility] medical records  [id number]        Your Vitals Were     Pulse Temperature Respirations Height Last Period Breastfeeding?    77 98.6  F (37  C) (Tympanic) 16 5' 5.75\" (1.67 m) 01/09/2018 No    BMI (Body Mass Index)                   24.17 kg/m2            Blood Pressure from Last 3 Encounters:   07/27/18 116/69   06/27/18 109/66   06/04/18 119/77    Weight from Last 3 Encounters:   07/27/18 148 lb 9.6 oz (67.4 kg)   06/27/18 145 lb 12.8 oz (66.1 kg)   06/04/18 140 lb (63.5 kg)              We Performed the Following     ADMIN 1st VACCINE     TDAP, IM (10 - 64 YRS) - Adacel        Primary Care Provider Fax #    Physician No Ref-Primary [phone]    "    No address on file        Equal Access to Services     ALFREDO WILFRED : Hadii aad ku hadlenchohe Domingoali, wasowmyadimitri avilawilianha, wiliam harisjesusdimitri alba, lindsay heard. So [facility] [phone].    ATENCIÓN: Si habla español, tiene a noland disposición servicios gratuitos de asistencia lingüística. [facility] [phone].    We comply with applicable federal civil rights laws and [place] laws. We do not discriminate on the basis of race, color, national origin, age, disability, sex, sexual orientation, or gender identity.            Thank you!     Thank you for choosing [facility]  for your care. Our goal is always to provide you with excellent care. Hearing back from our patients is one way we can continue to improve our services. Please take a few minutes to complete the written survey that you may receive in the mail after your visit with us. Thank you!             Your Updated Medication List - Protect others around you: Learn how to safely use, store and throw away your medicines at www.disposemymeds.org.          This list is accurate as of 7/27/18  9:55 AM.  Always use your most recent med list.                   Brand Name Dispense Instructions for use Diagnosis    EPINEPHrine 0.3 MG/0.3ML injection 2-pack    EPIPEN/ADRENACLICK/or ANY BX GENERIC EQUIV     Inject 0.3 mg into the muscle        PRENATAL ADULT GUMMY/DHA/FA PO      Take 2 chew tab by mouth

## 2018-07-27 NOTE — PATIENT INSTRUCTIONS
Kick Counts    It s normal to worry about your baby s health. One way you can know your baby s doing well is to record the baby s movements once a day. This is called a kick count. Remember to take your kick count records to all your appointments with your healthcare provider.  How to count kicks  Here are tips for counting kicks:    Choose a time when the baby is active, such as after a meal.     Sit comfortably or lie on your side.     The first time the baby moves, write down the time.     Count each movement until the baby has moved 10 times. This can take from 20 minutes to 2 hours.     Try to do it at the same time each day.  When to call your healthcare provider  Call your healthcare provider right away if you notice any of the following:    Your baby moves fewer than 10 times in 2 hours while you re doing kick counts.    Your baby moves much less often than on the days before.    You have not felt your baby move all day.  Date Last Reviewed: 12/1/2017 2000-2017 [employer]. [address] [place] [address], [place] [address]. All rights reserved. This information is not intended as a substitute for professional medical care. Always follow your healthcare professional's instructions.

## 2018-08-10 ENCOUNTER — PRENATAL OFFICE VISIT (OUTPATIENT)
Dept: OBGYN | Facility: CLINIC | Age: 33
End: 2018-08-10
Payer: COMMERCIAL

## 2018-08-10 VITALS
HEIGHT: 66 IN | SYSTOLIC BLOOD PRESSURE: 113 MMHG | DIASTOLIC BLOOD PRESSURE: 67 MMHG | TEMPERATURE: 98.8 F | WEIGHT: 151.8 LBS | HEART RATE: 83 BPM | BODY MASS INDEX: 24.4 KG/M2 | RESPIRATION RATE: 16 BRPM

## 2018-08-10 DIAGNOSIS — Z34.03 ENCOUNTER FOR SUPERVISION OF NORMAL FIRST PREGNANCY IN THIRD TRIMESTER: Primary | ICD-10-CM

## 2018-08-10 DIAGNOSIS — K59.00 CONSTIPATION, UNSPECIFIED CONSTIPATION TYPE: ICD-10-CM

## 2018-08-10 PROCEDURE — 99207 ZZC PRENATAL VISIT: CPT | Performed by: OBSTETRICS & GYNECOLOGY

## 2018-08-10 RX ORDER — ASPIRIN 81 MG
100 TABLET, DELAYED RELEASE (ENTERIC COATED) ORAL DAILY
Qty: 60 TABLET | Refills: 1 | Status: SHIPPED | OUTPATIENT
Start: 2018-08-10 | End: 2021-03-08

## 2018-08-10 RX ORDER — BREAST PUMP
1 EACH MISCELLANEOUS PRN
Qty: 1 EACH | Refills: 0 | Status: ON HOLD | OUTPATIENT
Start: 2018-08-10 | End: 2018-10-12

## 2018-08-10 NOTE — MR AVS SNAPSHOT
After Visit Summary   8/10/2018    Venessa Guillen    MRN: [id number]           Patient Information     Date Of Birth          1985        Visit Information        Provider Department      8/10/2018 9:15 AM Elena Malone MD [facility]        Today's Diagnoses     Encounter for supervision of normal first pregnancy in third trimester    -  1    Constipation, unspecified constipation type           Follow-ups after your visit        Your next 10 appointments already scheduled     Aug 24, 2018  2:00 PM CDT   [facility] Mc [facility]   [facility]    [address] [address]   [phone]            Sep 07, 2018  3:30 PM CDT   [facility]   [facility]    [address] [address]   [phone]              Who to contact     If you have questions or need follow up information about today's clinic visit or your schedule please contact [facility] directly at [phone].  Normal or non-critical lab and imaging results will be communicated to you by MyChart, letter or phone within 4 business days after the clinic has received the results. If you do not hear from us within 7 days, please contact the clinic through Yakimbihart or phone. If you have a critical or abnormal lab result, we will notify you by phone as soon as possible.  Submit refill requests through HumanCloud or call your pharmacy and they will forward the refill request to us. Please allow 3 business days for your refill to be completed.          Additional Information About Your Visit        MyChart Information     HumanCloud gives you secure access to your electronic health record. If you see a primary care provider, you can also send messages to your care team and make  "appointments. If you have questions, please call your primary care clinic.  If you do not have a primary care provider, please call [phone] and they will assist you.        Care EveryWhere ID     This is your Care EveryWhere ID. This could be used by other organizations to access your [facility] medical records  [id number]        Your Vitals Were     Pulse Temperature Respirations Height Last Period BMI (Body Mass Index)    83 98.8  F (37.1  C) 16 5' 5.75\" (1.67 m) 01/09/2018 24.69 kg/m2       Blood Pressure from Last 3 Encounters:   08/10/18 113/67   07/27/18 116/69   06/27/18 109/66    Weight from Last 3 Encounters:   08/10/18 151 lb 12.8 oz (68.9 kg)   07/27/18 148 lb 9.6 oz (67.4 kg)   06/27/18 145 lb 12.8 oz (66.1 kg)              Today, you had the following     No orders found for display         Today's Medication Changes          These changes are accurate as of 8/10/18  9:51 AM.  If you have any questions, ask your nurse or doctor.               Start taking these medicines.        Dose/Directions    breast pump Misc   Used for:  Encounter for supervision of normal first pregnancy in third trimester   Started by:  Elena Malone MD        Dose:  1 each   1 each as needed   Quantity:  1 each   Refills:  0       docusate sodium 100 MG tablet   Commonly known as:  COLACE   Used for:  Constipation, unspecified constipation type, Encounter for supervision of normal first pregnancy in third trimester   Started by:  Elena Malone MD        Dose:  100 mg   Take 100 mg by mouth daily   Quantity:  60 tablet   Refills:  1            Where to get your medicines      These medications were sent to [facility] [place], [place] - [address]  [address] [address]     Phone:  [phone]     docusate sodium 100 MG tablet         Some of these will need a paper prescription and others can be bought over the counter.  Ask your nurse if you have " questions.     Bring a paper prescription for each of these medications     breast pump Misc                Primary Care Provider Fax #    Physician No Ref-Primary [phone]       No address on file        Equal Access to Services     ANDRÉS HARDIN : Hadii aad ku hadlenchohe Hellenmegan, katie austinjennifer, wiliam alba, lindsay waldron sarbjitmargi cruz fco heard. So [facility] [phone].    ATENCIÓN: Si habla español, tiene a noland disposición servicios gratuitos de asistencia lingüística. Llame al [phone].    We comply with applicable federal civil rights laws and [place] laws. We do not discriminate on the basis of race, color, national origin, age, disability, sex, sexual orientation, or gender identity.            Thank you!     Thank you for choosing [facility]  for your care. Our goal is always to provide you with excellent care. Hearing back from our patients is one way we can continue to improve our services. Please take a few minutes to complete the written survey that you may receive in the mail after your visit with us. Thank you!             Your Updated Medication List - Protect others around you: Learn how to safely use, store and throw away your medicines at www.disposemymeds.org.          This list is accurate as of 8/10/18  9:51 AM.  Always use your most recent med list.                   Brand Name Dispense Instructions for use Diagnosis    breast pump Misc     1 each    1 each as needed    Encounter for supervision of normal first pregnancy in third trimester       docusate sodium 100 MG tablet    COLACE    60 tablet    Take 100 mg by mouth daily    Constipation, unspecified constipation type, Encounter for supervision of normal first pregnancy in third trimester       EPINEPHrine 0.3 MG/0.3ML injection 2-pack    EPIPEN/ADRENACLICK/or ANY BX GENERIC EQUIV     Inject 0.3 mg into the muscle        PRENATAL ADULT GUMMY/DHA/FA PO      Take 2 chew tab by mouth

## 2018-08-10 NOTE — PROGRESS NOTES
"CC: prenatal   S: no lof/vb/dc.  Good fm. Rare maty boswell. Constipation-was taking intermittent miralax but now needs something everyday.  /67  Pulse 83  Temp 98.8  F (37.1  C)  Resp 16  Ht 5' 5.75\" (1.67 m)  Wt 151 lb 12.8 oz (68.9 kg)  LMP 01/09/2018  BMI 24.69 kg/m2  Limited transabdominal US done for position-vertex, active fetal  Movement, grossly normal fluid  A/P routine precautions  F/u 2 weeks  Constipation-was taking intermittent miralax but now needs something everyday.  Start colace twice a day.  Breast pump rx  Elena Malone MD    "

## 2018-08-24 ENCOUNTER — PRENATAL OFFICE VISIT (OUTPATIENT)
Dept: OBGYN | Facility: CLINIC | Age: 33
End: 2018-08-24
Payer: COMMERCIAL

## 2018-08-24 VITALS
TEMPERATURE: 98.7 F | WEIGHT: 153 LBS | HEIGHT: 66 IN | HEART RATE: 85 BPM | RESPIRATION RATE: 16 BRPM | DIASTOLIC BLOOD PRESSURE: 65 MMHG | SYSTOLIC BLOOD PRESSURE: 108 MMHG | BODY MASS INDEX: 24.59 KG/M2

## 2018-08-24 DIAGNOSIS — Z34.03 ENCOUNTER FOR PRENATAL CARE IN THIRD TRIMESTER OF FIRST PREGNANCY: Primary | ICD-10-CM

## 2018-08-24 PROCEDURE — 99207 ZZC PRENATAL VISIT: CPT | Performed by: OBSTETRICS & GYNECOLOGY

## 2018-08-24 NOTE — PROGRESS NOTES
"CC: Here for routine prenatal visit @ 32w3d   HPI:  Some BHC; feeling well; denies LOF or bleeding    PE: /65 (BP Location: Right arm, Patient Position: Chair, Cuff Size: Adult Small)  Pulse 85  Temp 98.7  F (37.1  C) (Tympanic)  Resp 16  Ht 5' 5.75\" (1.67 m)  Wt 153 lb (69.4 kg)  LMP 01/09/2018  BMI 24.88 kg/m2   See OB flowsheet      A:  1. Encounter for prenatal care in third trimester of first pregnancy        Routine prenatal care  RTC 2 weeks.      Yojana Mc M.D.     "

## 2018-08-24 NOTE — MR AVS SNAPSHOT
After Visit Summary   8/24/2018    Venessa Guillen    MRN: [id number]           Patient Information     Date Of Birth          1985        Visit Information        Provider Department      8/24/2018 2:00 PM Yojana Mc MD [facility]        Today's Diagnoses     Encounter for prenatal care in third trimester of first pregnancy    -  1       Follow-ups after your visit        Your next 10 appointments already scheduled     Sep 04, 2018  1:30 PM CDT   [facility]ABL[facility]   [facility]    [address] [address]   [phone]            Sep 19, 2018  2:45 PM CDT   [facility] Establ[facility] Prenatal with [facility] Mc [facility]   [facility]    [address] [address]   [phone]            Sep 26, 2018  4:00 PM CDT   [facility]   [facility]    [address] [address]   [phone]            Oct 03, 2018  3:45 PM CDT   [facility]atal [facility] Mc [facility]   [facility]    [address] [address]   [phone]            Oct 10, 2018  4:00 PM CDT   [facility] Establ[facility] Prenatal with [facility] Mc [facility]   [facility]    [address] [address]   [phone]              Who to contact     If you have questions or need follow up information about today's clinic visit or your schedule please contact [facility] directly at [phone].  Normal or non-critical lab and imaging results will be communicated to you by MyChart, letter or phone within 4 business days after the clinic has received the results. If  "you do not hear from us within 7 days, please contact the clinic through Amerpages or phone. If you have a critical or abnormal lab result, we will notify you by phone as soon as possible.  Submit refill requests through Amerpages or call your pharmacy and they will forward the refill request to us. Please allow 3 business days for your refill to be completed.          Additional Information About Your Visit        iConcludeharLovethelook Information     Amer[employer] gives you secure access to your electronic health record. If you see a primary care provider, you can also send messages to your care team and make appointments. If you have questions, please call your primary care clinic.  If you do not have a primary care provider, please call [phone] and they will assist you.        Care EveryWhere ID     This is your Care EveryWhere ID. This could be used by other organizations to access your [place] medical records  [id number]        Your Vitals Were     Pulse Temperature Respirations Height Last Period BMI (Body Mass Index)    85 98.7  F (37.1  C) (Tympanic) 16 5' 5.75\" (1.67 m) 01/09/2018 24.88 kg/m2       Blood Pressure from Last 3 Encounters:   08/24/18 108/65   08/10/18 113/67   07/27/18 116/69    Weight from Last 3 Encounters:   08/24/18 153 lb (69.4 kg)   08/10/18 151 lb 12.8 oz (68.9 kg)   07/27/18 148 lb 9.6 oz (67.4 kg)              Today, you had the following     No orders found for display       Primary Care Provider Fax #    Physician No Ref-Primary [phone]       No address on file        Equal Access to Services     [facility]O [facility] : Hadii aad ku hadasho Soomaali, waaxda luqadaha, qaybta kaalmada adelindsay arvizu . So [facility] [phone].    ATENCIÓN: Si habla español, tiene a noland disposición servicios gratuitos de asistencia lingüística. Llame al [phone].    We comply with applicable federal civil rights laws and [place] laws. We do not discriminate on the basis of race, " color, national origin, age, disability, sex, sexual orientation, or gender identity.            Thank you!     Thank you for choosing [facility]  for your care. Our goal is always to provide you with excellent care. Hearing back from our patients is one way we can continue to improve our services. Please take a few minutes to complete the written survey that you may receive in the mail after your visit with us. Thank you!             Your Updated Medication List - Protect others around you: Learn how to safely use, store and throw away your medicines at www.disposemymeds.org.          This list is accurate as of 8/24/18  2:11 PM.  Always use your most recent med list.                   Brand Name Dispense Instructions for use Diagnosis    breast pump Misc     1 each    1 each as needed    Encounter for supervision of normal first pregnancy in third trimester       docusate sodium 100 MG tablet    COLACE    60 tablet    Take 100 mg by mouth daily    Constipation, unspecified constipation type, Encounter for supervision of normal first pregnancy in third trimester       EPINEPHrine 0.3 MG/0.3ML injection 2-pack    EPIPEN/ADRENACLICK/or ANY BX GENERIC EQUIV     Inject 0.3 mg into the muscle        PRENATAL ADULT GUMMY/DHA/FA PO      Take 2 chew tab by mouth

## 2018-09-06 ENCOUNTER — PRENATAL OFFICE VISIT (OUTPATIENT)
Dept: OBGYN | Facility: CLINIC | Age: 33
End: 2018-09-06
Payer: COMMERCIAL

## 2018-09-06 VITALS
BODY MASS INDEX: 24.75 KG/M2 | WEIGHT: 154 LBS | HEART RATE: 85 BPM | RESPIRATION RATE: 16 BRPM | HEIGHT: 66 IN | TEMPERATURE: 97.9 F | SYSTOLIC BLOOD PRESSURE: 117 MMHG | DIASTOLIC BLOOD PRESSURE: 76 MMHG

## 2018-09-06 DIAGNOSIS — Z34.03 ENCOUNTER FOR PRENATAL CARE IN THIRD TRIMESTER OF FIRST PREGNANCY: Primary | ICD-10-CM

## 2018-09-06 PROCEDURE — 99207 ZZC PRENATAL VISIT: CPT | Performed by: OBSTETRICS & GYNECOLOGY

## 2018-09-06 NOTE — MR AVS SNAPSHOT
After Visit Summary   9/6/2018    Venessa Guillen    MRN: [id number]           Patient Information     Date Of Birth          1985        Visit Information        Provider Department      9/6/2018 8:45 AM Aleena Delgado MD [facility]        Today's Diagnoses     Encounter for prenatal care in third trimester of first pregnancy    -  1       Follow-ups after your visit        Follow-up notes from your care team     Return in about 2 weeks (around 9/20/2018).      Your next 10 appointments already scheduled     Sep 19, 2018  2:45 PM CDT   [facility] Established Prenatal with Yojana Mc MD   [facility] ([facility])    [address] [address]   [phone]            Sep 26, 2018  4:00 PM CDT   [facility] Established Prenatal with Elena Malone MD   [facility] ([facility])    [address] [address]   [phone]            Oct 03, 2018  3:45 PM CDT   [facility] Established Prenatal with Yojana Mc MD   [facility] ([facility])    [address] [address]   [phone]            Oct 10, 2018  4:00 PM CDT   [facility] Established Prenatal with Yojana Mc MD   [facility] ([facility])    [address] [address]   [phone]              Who to contact     If you have questions or need follow up information about today's clinic visit or your schedule please contact [facility] directly at [phone].  Normal or non-critical lab and imaging results will be communicated to you by MyChart, letter or phone within 4 business days after the clinic has received the results. If you do not hear from us within 7 days, please contact the clinic through MyChart or phone. If you have a critical or abnormal  "lab result, we will notify you by phone as soon as possible.  Submit refill requests through [employer] or call your pharmacy and they will forward the refill request to us. Please allow 3 business days for your refill to be completed.          Additional Information About Your Visit        RamTiger Fitnesshart Information     [employer] gives you secure access to your electronic health record. If you see a primary care provider, you can also send messages to your care team and make appointments. If you have questions, please call your primary care clinic.  If you do not have a primary care provider, please call [phone] and they will assist you.        Care EveryWhere ID     This is your Care EveryWhere ID. This could be used by other organizations to access your [place] medical records  [id number]        Your Vitals Were     Pulse Temperature Respirations Height Last Period Breastfeeding?    85 97.9  F (36.6  C) (Tympanic) 16 5' 5.75\" (1.67 m) 01/09/2018 No    BMI (Body Mass Index)                   25.05 kg/m2            Blood Pressure from Last 3 Encounters:   09/06/18 117/76   08/24/18 108/65   08/10/18 113/67    Weight from Last 3 Encounters:   09/06/18 154 lb (69.9 kg)   08/24/18 153 lb (69.4 kg)   08/10/18 151 lb 12.8 oz (68.9 kg)              Today, you had the following     No orders found for display       Primary Care Provider Fax #    Physician No Ref-Primary [phone]       No address on file        Equal Access to Services     ANDRÉS HARDIN AH: Hadii hermann bonillao Somegan, waaxda luqadaha, qaybta kaalmada adeegyada, lindsay eagle . So [facility] [phone].    ATENCIÓN: Si habla español, tiene a noland disposición servicios gratuitos de asistencia lingüística. Llame al [phone].    We comply with applicable federal civil rights laws and [place] laws. We do not discriminate on the basis of race, color, national origin, age, disability, sex, sexual orientation, or gender identity.       "      Thank you!     Thank you for choosing [facility]  for your care. Our goal is always to provide you with excellent care. Hearing back from our patients is one way we can continue to improve our services. Please take a few minutes to complete the written survey that you may receive in the mail after your visit with us. Thank you!             Your Updated Medication List - Protect others around you: Learn how to safely use, store and throw away your medicines at www.disposemymeds.org.          This list is accurate as of 9/6/18  9:02 AM.  Always use your most recent med list.                   Brand Name Dispense Instructions for use Diagnosis    breast pump Misc     1 each    1 each as needed    Encounter for supervision of normal first pregnancy in third trimester       docusate sodium 100 MG tablet    COLACE    60 tablet    Take 100 mg by mouth daily    Constipation, unspecified constipation type, Encounter for supervision of normal first pregnancy in third trimester       EPINEPHrine 0.3 MG/0.3ML injection 2-pack    EPIPEN/ADRENACLICK/or ANY BX GENERIC EQUIV     Inject 0.3 mg into the muscle        PRENATAL ADULT GUMMY/DHA/FA PO      Take 2 chew tab by mouth

## 2018-09-06 NOTE — PROGRESS NOTES
Doing well.  +FM, +BH ctx, no VB or LOF.    33 year old  at 34w2d   - discussed GBS and cvx chk at 36 week visit  - discussed s/s of labor and reviewed when to call    RTC 2 weeks    Aleena Delgado MD, MPH  [facility]

## 2018-09-06 NOTE — PROGRESS NOTES
Prenatal Breastfeeding Education Toolkit provided for patient to review, helping her to make an informed decision on a feeding choice for her baby. Questions directed to the provider.  Venessa Marshall, [facility]

## 2018-09-06 NOTE — NURSING NOTE
"Initial /76 (BP Location: Left arm, Patient Position: Chair, Cuff Size: Adult Regular)  Pulse 85  Temp 97.9  F (36.6  C) (Tympanic)  Resp 16  Ht 5' 5.75\" (1.67 m)  Wt 154 lb (69.9 kg)  LMP 01/09/2018  Breastfeeding? No  BMI 25.05 kg/m2 Estimated body mass index is 25.05 kg/(m^2) as calculated from the following:    Height as of this encounter: 5' 5.75\" (1.67 m).    Weight as of this encounter: 154 lb (69.9 kg). .    Venessa Marshall, [facility]    "

## 2018-09-19 ENCOUNTER — PRENATAL OFFICE VISIT (OUTPATIENT)
Dept: OBGYN | Facility: CLINIC | Age: 33
End: 2018-09-19
Payer: COMMERCIAL

## 2018-09-19 VITALS
WEIGHT: 154 LBS | HEIGHT: 66 IN | SYSTOLIC BLOOD PRESSURE: 110 MMHG | DIASTOLIC BLOOD PRESSURE: 71 MMHG | HEART RATE: 81 BPM | RESPIRATION RATE: 18 BRPM | BODY MASS INDEX: 24.75 KG/M2 | TEMPERATURE: 98.4 F

## 2018-09-19 DIAGNOSIS — Z23 NEED FOR PROPHYLACTIC VACCINATION AND INOCULATION AGAINST INFLUENZA: ICD-10-CM

## 2018-09-19 DIAGNOSIS — Z3A.36 36 WEEKS GESTATION OF PREGNANCY: Primary | ICD-10-CM

## 2018-09-19 PROCEDURE — 87653 STREP B DNA AMP PROBE: CPT | Performed by: OBSTETRICS & GYNECOLOGY

## 2018-09-19 PROCEDURE — 99207 ZZC PRENATAL VISIT: CPT | Performed by: OBSTETRICS & GYNECOLOGY

## 2018-09-19 PROCEDURE — 90471 IMMUNIZATION ADMIN: CPT | Performed by: OBSTETRICS & GYNECOLOGY

## 2018-09-19 PROCEDURE — 90686 IIV4 VACC NO PRSV 0.5 ML IM: CPT | Performed by: OBSTETRICS & GYNECOLOGY

## 2018-09-19 NOTE — PROGRESS NOTES
"CC: Here for routine prenatal visit @ 36w1d   HPI:  Feeling well; reviewed s/s of labor    PE: /71 (BP Location: Right arm, Patient Position: Chair, Cuff Size: Adult Small)  Pulse 81  Temp 98.4  F (36.9  C) (Tympanic)  Resp 18  Ht 5' 5.75\" (1.67 m)  Wt 154 lb (69.9 kg)  LMP 01/09/2018  BMI 25.05 kg/m2   See OB flowsheet      A:  1. 36 weeks gestation of pregnancy    - Strep, Group B by PCR    2. Need for prophylactic vaccination and inoculation against influenza    - FLU VACCINE, SPLIT VIRUS, IM (QUADRIVALENT) [58627]- >3 YRS  - Vaccine Administration, Initial [51925]      Routine prenatal care  RTC 1 weeks.      Yojana Mc M.D.     "

## 2018-09-19 NOTE — PROGRESS NOTES

## 2018-09-19 NOTE — MR AVS SNAPSHOT
After Visit Summary   9/19/2018    Venessa Guillen    MRN: [id number]           Patient Information     Date Of Birth          1985        Visit Information        Provider Department      9/19/2018 2:45 PM Yojana Mc MD [facility]        Today's Diagnoses     36 weeks gestation of pregnancy    -  1    Need for prophylactic vaccination and inoculation against influenza           Follow-ups after your visit        Your next 10 appointments already scheduled     Sep 26, 2018  4:00 PM CDT   [facility] Established Prenatal with Elena Malone MD   [facility]    [address] [address]   [phone]            Oct 03, 2018  3:45 PM CDT   [facility] Established Prenatal with Yojana Mc MD   [facility] ([facility])    [address] [address]   [phone]            Oct 10, 2018  4:00 PM CDT   [facility] Established Prenatal with Yojana Mc MD   [facility] ([facility]    [address] [address]   [phone]              Who to contact     If you have questions or need follow up information about today's clinic visit or your schedule please contact [facility] directly at [phone].  Normal or non-critical lab and imaging results will be communicated to you by MyChart, letter or phone within 4 business days after the clinic has received the results. If you do not hear from us within 7 days, please contact the clinic through MyChart or phone. If you have a critical or abnormal lab result, we will notify you by phone as soon as possible.  Submit refill requests through [employer] or call your pharmacy and they will forward the refill request to us. Please allow 3 business days for your refill to be completed.          Additional Information  "About Your Visit        MyChart Information     [employer] gives you secure access to your electronic health record. If you see a primary care provider, you can also send messages to your care team and make appointments. If you have questions, please call your primary care clinic.  If you do not have a primary care provider, please call [phone] and they will assist you.        Care EveryWhere ID     This is your Care EveryWhere ID. This could be used by other organizations to access your [place] medical records  [id number]        Your Vitals Were     Pulse Temperature Respirations Height Last Period BMI (Body Mass Index)    81 98.4  F (36.9  C) (Tympanic) 18 5' 5.75\" (1.67 m) 01/09/2018 25.05 kg/m2       Blood Pressure from Last 3 Encounters:   09/19/18 110/71   09/06/18 117/76   08/24/18 108/65    Weight from Last 3 Encounters:   09/19/18 154 lb (69.9 kg)   09/06/18 154 lb (69.9 kg)   08/24/18 153 lb (69.4 kg)              We Performed the Following     FLU VACCINE, SPLIT VIRUS, IM (QUADRIVALENT) [44680]- >3 YRS     Strep, Group B by PCR     Vaccine Administration, Initial [22947]        Primary Care Provider Fax #    Physician No Ref-Primary [phone]       No address on file        Equal Access to Services     ANDRÉS HARDIN : Hadii hermann bonillao Somegan, waaxda luqadaha, qaybta kaalmada adeluh, lindsay eagle . So [facility] [phone].    ATENCIÓN: Si habla español, tiene a noland disposición servicios gratuitos de asistencia lingüística. Llame al [phone].    We comply with applicable federal civil rights laws and [place] laws. We do not discriminate on the basis of race, color, national origin, age, disability, sex, sexual orientation, or gender identity.            Thank you!     Thank you for choosing [facility]  for your care. Our goal is always to provide you with excellent care. Hearing back from our patients is one way we can continue to improve our " services. Please take a few minutes to complete the written survey that you may receive in the mail after your visit with us. Thank you!             Your Updated Medication List - Protect others around you: Learn how to safely use, store and throw away your medicines at www.disposemymeds.org.          This list is accurate as of 9/19/18  3:19 PM.  Always use your most recent med list.                   Brand Name Dispense Instructions for use Diagnosis    breast pump Misc     1 each    1 each as needed    Encounter for supervision of normal first pregnancy in third trimester       docusate sodium 100 MG tablet    COLACE    60 tablet    Take 100 mg by mouth daily    Constipation, unspecified constipation type, Encounter for supervision of normal first pregnancy in third trimester       EPINEPHrine 0.3 MG/0.3ML injection 2-pack    EPIPEN/ADRENACLICK/or ANY BX GENERIC EQUIV     Inject 0.3 mg into the muscle        PRENATAL ADULT GUMMY/DHA/FA PO      Take 2 chew tab by mouth

## 2018-09-20 LAB
GP B STREP DNA SPEC QL NAA+PROBE: NEGATIVE
SPECIMEN SOURCE: NORMAL

## 2018-09-26 ENCOUNTER — PRENATAL OFFICE VISIT (OUTPATIENT)
Dept: OBGYN | Facility: CLINIC | Age: 33
End: 2018-09-26
Payer: COMMERCIAL

## 2018-09-26 VITALS
TEMPERATURE: 98 F | WEIGHT: 157.6 LBS | BODY MASS INDEX: 25.33 KG/M2 | DIASTOLIC BLOOD PRESSURE: 72 MMHG | HEIGHT: 66 IN | SYSTOLIC BLOOD PRESSURE: 132 MMHG | HEART RATE: 70 BPM | RESPIRATION RATE: 16 BRPM

## 2018-09-26 DIAGNOSIS — Z34.03 ENCOUNTER FOR PRENATAL CARE IN THIRD TRIMESTER OF FIRST PREGNANCY: Primary | ICD-10-CM

## 2018-09-26 PROCEDURE — 99207 ZZC PRENATAL VISIT: CPT | Performed by: OBSTETRICS & GYNECOLOGY

## 2018-09-26 RX ORDER — BREAST PUMP
1 EACH MISCELLANEOUS PRN
Qty: 1 EACH | Refills: 0 | Status: SHIPPED | OUTPATIENT
Start: 2018-09-26 | End: 2020-04-10

## 2018-09-26 RX ORDER — BREAST PUMP
1 EACH MISCELLANEOUS PRN
Qty: 1 EACH | Refills: 0 | Status: ON HOLD | OUTPATIENT
Start: 2018-09-26 | End: 2018-10-12

## 2018-09-26 NOTE — MR AVS SNAPSHOT
After Visit Summary   9/26/2018    Venessa Guillen    MRN: [id number]           Patient Information     Date Of Birth          1985        Visit Information        Provider Department      9/26/2018 4:00 PM Elena Malone MD [facility]        Today's Diagnoses     Encounter for prenatal care in third trimester of first pregnancy    -  1       Follow-ups after your visit        Your next 10 appointments already scheduled     Oct 03, 2018  3:45 PM CDT   [facility] Established Prenatal with Yojana Mc MD   [facility] ([facility])    [address] [address]   [phone]            Oct 10, 2018  4:00 PM CDT   [facility] Established Prenatal with Yojana Mc MD   [facility] ([facility])    [address] [address]   [phone]              Who to contact     If you have questions or need follow up information about today's clinic visit or your schedule please contact [facility] directly at [phone].  Normal or non-critical lab and imaging results will be communicated to you by MyChart, letter or phone within 4 business days after the clinic has received the results. If you do not hear from us within 7 days, please contact the clinic through Del[employer] or phone. If you have a critical or abnormal lab result, we will notify you by phone as soon as possible.  Submit refill requests through [employer] or call your pharmacy and they will forward the refill request to us. Please allow 3 business days for your refill to be completed.          Additional Information About Your Visit        MyChart Information     [employer] gives you secure access to your electronic health record. If you see a primary care provider, you can also send messages to your care team and make appointments. If you have questions, please call your primary care  "clinic.  If you do not have a primary care provider, please call [phone] and they will assist you.        Care EveryWhere ID     This is your Care EveryWhere ID. This could be used by other organizations to access your [place] medical records  [id number]        Your Vitals Were     Pulse Temperature Respirations Height Last Period BMI (Body Mass Index)    70 98  F (36.7  C) (Tympanic) 16 5' 5.75\" (1.67 m) 01/09/2018 25.63 kg/m2       Blood Pressure from Last 3 Encounters:   09/26/18 132/72   09/19/18 110/71   09/06/18 117/76    Weight from Last 3 Encounters:   09/26/18 157 lb 9.6 oz (71.5 kg)   09/19/18 154 lb (69.9 kg)   09/06/18 154 lb (69.9 kg)              Today, you had the following     No orders found for display       Primary Care Provider Fax #    Physician No Ref-Primary [phone]       No address on file        Equal Access to Services     ANDRÉS HARDIN : Hadii hermann bonillao Somegan, waaxda luqadaha, qaybta kaalmada adeegyada, lindsay eagle . So [facility] [phone].    ATENCIÓN: Si habla español, tiene a noland disposición servicios gratuitos de asistencia lingüística. Llame al [phone].    We comply with applicable federal civil rights laws and [place] laws. We do not discriminate on the basis of race, color, national origin, age, disability, sex, sexual orientation, or gender identity.            Thank you!     Thank you for choosing [facility]  for your care. Our goal is always to provide you with excellent care. Hearing back from our patients is one way we can continue to improve our services. Please take a few minutes to complete the written survey that you may receive in the mail after your visit with us. Thank you!             Your Updated Medication List - Protect others around you: Learn how to safely use, store and throw away your medicines at www.disposemymeds.org.          This list is accurate as of 9/26/18  4:14 PM.  Always use your most " recent med list.                   Brand Name Dispense Instructions for use Diagnosis    breast pump Misc     1 each    1 each as needed    Encounter for supervision of normal first pregnancy in third trimester       docusate sodium 100 MG tablet    COLACE    60 tablet    Take 100 mg by mouth daily    Constipation, unspecified constipation type, Encounter for supervision of normal first pregnancy in third trimester       EPINEPHrine 0.3 MG/0.3ML injection 2-pack    EPIPEN/ADRENACLICK/or ANY BX GENERIC EQUIV     Inject 0.3 mg into the muscle        PRENATAL ADULT GUMMY/DHA/FA PO      Take 2 chew tab by mouth

## 2018-09-26 NOTE — NURSING NOTE
"Initial /72 (BP Location: Right arm, Patient Position: Chair, Cuff Size: Adult Small)  Pulse 70  Temp 98  F (36.7  C) (Tympanic)  Resp 16  Ht 5' 5.75\" (1.67 m)  Wt 157 lb 9.6 oz (71.5 kg)  LMP 01/09/2018  BMI 25.63 kg/m2 Estimated body mass index is 25.63 kg/(m^2) as calculated from the following:    Height as of this encounter: 5' 5.75\" (1.67 m).    Weight as of this encounter: 157 lb 9.6 oz (71.5 kg). .      "

## 2018-09-26 NOTE — PROGRESS NOTES
"CC: prenatal  S: having lots of maty boswell. Nothing regular.  No lof/vb/dc.  Good fm.  /72 (BP Location: Right arm, Patient Position: Chair, Cuff Size: Adult Small)  Pulse 70  Temp 98  F (36.7  C) (Tympanic)  Resp 16  Ht 5' 5.75\" (1.67 m)  Wt 157 lb 9.6 oz (71.5 kg)  LMP 01/09/2018  BMI 25.63 kg/m2  A/P routine precautions  F/u 1 week  Elena Malone MD    "

## 2018-10-03 ENCOUNTER — PRENATAL OFFICE VISIT (OUTPATIENT)
Dept: OBGYN | Facility: CLINIC | Age: 33
End: 2018-10-03
Payer: COMMERCIAL

## 2018-10-03 VITALS
WEIGHT: 159 LBS | BODY MASS INDEX: 25.55 KG/M2 | RESPIRATION RATE: 16 BRPM | HEART RATE: 77 BPM | SYSTOLIC BLOOD PRESSURE: 121 MMHG | TEMPERATURE: 98.5 F | HEIGHT: 66 IN | DIASTOLIC BLOOD PRESSURE: 77 MMHG

## 2018-10-03 DIAGNOSIS — Z34.03 ENCOUNTER FOR PRENATAL CARE IN THIRD TRIMESTER OF FIRST PREGNANCY: Primary | ICD-10-CM

## 2018-10-03 PROCEDURE — 99207 ZZC PRENATAL VISIT: CPT | Performed by: OBSTETRICS & GYNECOLOGY

## 2018-10-03 NOTE — PROGRESS NOTES
"CC: Here for routine prenatal visit @ 38w1d   HPI:  Having a lot of BHC and much more pressure; denies LOF or bleeding    PE: /77 (BP Location: Right arm, Patient Position: Chair, Cuff Size: Adult Small)  Pulse 77  Temp 98.5  F (36.9  C) (Tympanic)  Resp 16  Ht 5' 5.75\" (1.67 m)  Wt 159 lb (72.1 kg)  LMP 01/09/2018  BMI 25.86 kg/m2   See OB flowsheet      A:  1. Encounter for prenatal care in third trimester of first pregnancy        Routine prenatal care  RTC 1 weeks.      Yojana Mc M.D.     "

## 2018-10-03 NOTE — MR AVS SNAPSHOT
After Visit Summary   10/3/2018    Venessa Guillen    MRN: [id number]           Patient Information     Date Of Birth          1985        Visit Information        Provider Department      10/3/2018 3:45 PM Yojana Mc MD [facility]        Today's Diagnoses     Encounter for prenatal care in third trimester of first pregnancy    -  1       Follow-ups after your visit        Your next 10 appointments already scheduled     Oct 10, 2018  4:00 PM CDT   MyChart OB-GYN Established Prenatal with Yojana Mc MD   [facility]    [address] [place] [address]   [phone]              Who to contact     If you have questions or need follow up information about today's clinic visit or your schedule please contact [facility] directly at [phone].  Normal or non-critical lab and imaging results will be communicated to you by MyChart, letter or phone within 4 business days after the clinic has received the results. If you do not hear from us within 7 days, please contact the clinic through [facility] or phone. If you have a critical or abnormal lab result, we will notify you by phone as soon as possible.  Submit refill requests through [employer] or call your pharmacy and they will forward the refill request to us. Please allow 3 business days for your refill to be completed.          Additional Information About Your Visit        MyChart Information     [employer] gives you secure access to your electronic health record. If you see a primary care provider, you can also send messages to your care team and make appointments. If you have questions, please call your primary care clinic.  If you do not have a primary care provider, please call [phone] and they will assist you.        Care EveryWhere ID     This is your Care EveryWhere ID. This could be used by other organizations to access your [facility]  "medical records  [id number]        Your Vitals Were     Pulse Temperature Respirations Height Last Period BMI (Body Mass Index)    77 98.5  F (36.9  C) (Tympanic) 16 5' 5.75\" (1.67 m) 01/09/2018 25.86 kg/m2       Blood Pressure from Last 3 Encounters:   10/03/18 121/77   09/26/18 132/72   09/19/18 110/71    Weight from Last 3 Encounters:   10/03/18 159 lb (72.1 kg)   09/26/18 157 lb 9.6 oz (71.5 kg)   09/19/18 154 lb (69.9 kg)              Today, you had the following     No orders found for display       Primary Care Provider Fax #    Physician No Ref-Primary [phone]       No address on file        Equal Access to Services     ANDRÉS HARDIN : Mary Beth Bella, wamarcela luqadaha, qaybta kaalmada anjali, lindsay eagle . So [facility] [phone].    ATENCIÓN: Si habla español, tiene a noland disposición servicios gratuitos de asistencia lingüística. Llame al [phone].    We comply with applicable federal civil rights laws and [place] laws. We do not discriminate on the basis of race, color, national origin, age, disability, sex, sexual orientation, or gender identity.            Thank you!     Thank you for choosing [facility]  for your care. Our goal is always to provide you with excellent care. Hearing back from our patients is one way we can continue to improve our services. Please take a few minutes to complete the written survey that you may receive in the mail after your visit with us. Thank you!             Your Updated Medication List - Protect others around you: Learn how to safely use, store and throw away your medicines at www.disposemymeds.org.          This list is accurate as of 10/3/18  4:03 PM.  Always use your most recent med list.                   Brand Name Dispense Instructions for use Diagnosis    * breast pump Misc     1 each    1 each as needed    Encounter for supervision of normal first pregnancy in third trimester       * breast pump " Misc     1 each    1 each as needed    Encounter for prenatal care in third trimester of first pregnancy       * breast pump Misc     1 each    1 each as needed (breast feeding)    Encounter for prenatal care in third trimester of first pregnancy       docusate sodium 100 MG tablet    COLACE    60 tablet    Take 100 mg by mouth daily    Constipation, unspecified constipation type, Encounter for supervision of normal first pregnancy in third trimester       EPINEPHrine 0.3 MG/0.3ML injection 2-pack    EPIPEN/ADRENACLICK/or ANY BX GENERIC EQUIV     Inject 0.3 mg into the muscle        PRENATAL ADULT GUMMY/DHA/FA PO      Take 2 chew tab by mouth        * Notice:  This list has 3 medication(s) that are the same as other medications prescribed for you. Read the directions carefully, and ask your doctor or other care provider to review them with you.

## 2018-10-09 ENCOUNTER — PRENATAL OFFICE VISIT (OUTPATIENT)
Dept: OBGYN | Facility: CLINIC | Age: 33
End: 2018-10-09
Payer: COMMERCIAL

## 2018-10-09 VITALS
TEMPERATURE: 98.5 F | HEIGHT: 66 IN | SYSTOLIC BLOOD PRESSURE: 121 MMHG | RESPIRATION RATE: 16 BRPM | HEART RATE: 77 BPM | BODY MASS INDEX: 25.81 KG/M2 | WEIGHT: 160.6 LBS | DIASTOLIC BLOOD PRESSURE: 73 MMHG

## 2018-10-09 DIAGNOSIS — Z34.03 ENCOUNTER FOR PRENATAL CARE IN THIRD TRIMESTER OF FIRST PREGNANCY: Primary | ICD-10-CM

## 2018-10-09 PROCEDURE — 99207 ZZC PRENATAL VISIT: CPT | Performed by: OBSTETRICS & GYNECOLOGY

## 2018-10-09 NOTE — MR AVS SNAPSHOT
"              After Visit Summary   10/9/2018    Venessa Guillen    MRN: [id number]           Patient Information     Date Of Birth          1985        Visit Information        Provider Department      10/9/2018 2:15 PM Aleena Delgado MD [facility]        Today's Diagnoses     Encounter for prenatal care in third trimester of first pregnancy    -  1       Follow-ups after your visit        Follow-up notes from your care team     Return in about 1 week (around 10/16/2018).      Who to contact     If you have questions or need follow up information about today's clinic visit or your schedule please contact [facility] directly at [phone].  Normal or non-critical lab and imaging results will be communicated to you by MyChart, letter or phone within 4 business days after the clinic has received the results. If you do not hear from us within 7 days, please contact the clinic through [employer]elf[employer] or phone. If you have a critical or abnormal lab result, we will notify you by phone as soon as possible.  Submit refill requests through [employer] or call your pharmacy and they will forward the refill request to us. Please allow 3 business days for your refill to be completed.          Additional Information About Your Visit        MyChart Information     [employer] gives you secure access to your electronic health record. If you see a primary care provider, you can also send messages to your care team and make appointments. If you have questions, please call your primary care clinic.  If you do not have a primary care provider, please call [phone] and they will assist you.        Care EveryWhere ID     This is your Care EveryWhere ID. This could be used by other organizations to access your [facility] medical records  [id number]        Your Vitals Were     Pulse Temperature Respirations Height Last Period Breastfeeding?    77 98.5  F (36.9  C) (Tympanic) 16 5' 5.75\" (1.67 m) 01/09/2018 No "    BMI (Body Mass Index)                   26.12 kg/m2            Blood Pressure from Last 3 Encounters:   10/09/18 121/73   10/03/18 121/77   09/26/18 132/72    Weight from Last 3 Encounters:   10/09/18 160 lb 9.6 oz (72.8 kg)   10/03/18 159 lb (72.1 kg)   09/26/18 157 lb 9.6 oz (71.5 kg)              Today, you had the following     No orders found for display       Primary Care Provider Fax #    Physician No Ref-Primary [phone]       No address on file        Equal Access to Services     ALFREDO HARDIN : Hadyadira Bella, katie chou, wiliam alba, lindsay eagle . So [facility] [phone].    ATENCIÓN: Si habla español, tiene a noland disposición servicios gratuitos de asistencia lingüística. Llame al [phone].    We comply with applicable federal civil rights laws and [place] laws. We do not discriminate on the basis of race, color, national origin, age, disability, sex, sexual orientation, or gender identity.            Thank you!     Thank you for choosing [facility]  for your care. Our goal is always to provide you with excellent care. Hearing back from our patients is one way we can continue to improve our services. Please take a few minutes to complete the written survey that you may receive in the mail after your visit with us. Thank you!             Your Updated Medication List - Protect others around you: Learn how to safely use, store and throw away your medicines at www.disposemymeds.org.          This list is accurate as of 10/9/18  3:08 PM.  Always use your most recent med list.                   Brand Name Dispense Instructions for use Diagnosis    * breast pump Misc     1 each    1 each as needed    Encounter for supervision of normal first pregnancy in third trimester       * breast pump Misc     1 each    1 each as needed    Encounter for prenatal care in third trimester of first pregnancy       * breast pump Misc     1 each     1 each as needed (breast feeding)    Encounter for prenatal care in third trimester of first pregnancy       docusate sodium 100 MG tablet    COLACE    60 tablet    Take 100 mg by mouth daily    Constipation, unspecified constipation type, Encounter for supervision of normal first pregnancy in third trimester       EPINEPHrine 0.3 MG/0.3ML injection 2-pack    EPIPEN/ADRENACLICK/or ANY BX GENERIC EQUIV     Inject 0.3 mg into the muscle        PRENATAL ADULT GUMMY/DHA/FA PO      Take 2 chew tab by mouth        * Notice:  This list has 3 medication(s) that are the same as other medications prescribed for you. Read the directions carefully, and ask your doctor or other care provider to review them with you.

## 2018-10-09 NOTE — NURSING NOTE
"Initial /73 (BP Location: Right arm, Patient Position: Sitting, Cuff Size: Adult Regular)  Pulse 77  Temp 98.5  F (36.9  C) (Tympanic)  Resp 16  Ht 5' 5.75\" (1.67 m)  Wt 160 lb 9.6 oz (72.8 kg)  LMP 01/09/2018  Breastfeeding? No  BMI 26.12 kg/m2 Estimated body mass index is 26.12 kg/(m^2) as calculated from the following:    Height as of this encounter: 5' 5.75\" (1.67 m).    Weight as of this encounter: 160 lb 9.6 oz (72.8 kg). .    Sandra Simons, [facility]    "

## 2018-10-10 ENCOUNTER — HOSPITAL ENCOUNTER (OUTPATIENT)
Facility: CLINIC | Age: 33
Discharge: HOME | End: 2018-10-10
Attending: OBSTETRICS & GYNECOLOGY | Admitting: OBSTETRICS & GYNECOLOGY
Payer: COMMERCIAL

## 2018-10-10 VITALS
SYSTOLIC BLOOD PRESSURE: 126 MMHG | TEMPERATURE: 97.3 F | HEART RATE: 77 BPM | DIASTOLIC BLOOD PRESSURE: 81 MMHG | RESPIRATION RATE: 16 BRPM

## 2018-10-10 DIAGNOSIS — Z34.03 ENCOUNTER FOR SUPERVISION OF NORMAL FIRST PREGNANCY IN THIRD TRIMESTER: Primary | ICD-10-CM

## 2018-10-10 PROBLEM — Z34.00 SUPERVISION OF NORMAL FIRST PREGNANCY: Status: ACTIVE | Noted: 2018-10-10

## 2018-10-10 PROCEDURE — G0463 HOSPITAL OUTPT CLINIC VISIT: HCPCS

## 2018-10-10 PROCEDURE — 59025 FETAL NON-STRESS TEST: CPT

## 2018-10-10 PROCEDURE — 40000809 ZZH STATISTIC NO DOCUMENTATION TO SUPPORT CHARGE

## 2018-10-10 RX ORDER — HYDROXYZINE PAMOATE 50 MG/1
50 CAPSULE ORAL 3 TIMES DAILY PRN
Qty: 6 CAPSULE | Refills: 0 | Status: SHIPPED | OUTPATIENT
Start: 2018-10-10 | End: 2020-04-10

## 2018-10-10 RX ORDER — ONDANSETRON 2 MG/ML
4 INJECTION INTRAMUSCULAR; INTRAVENOUS EVERY 6 HOURS PRN
Status: DISCONTINUED | OUTPATIENT
Start: 2018-10-10 | End: 2018-10-10 | Stop reason: HOSPADM

## 2018-10-10 ASSESSMENT — ACTIVITIES OF DAILY LIVING (ADL)
AMBULATION: 0-->INDEPENDENT
FALL_HISTORY_WITHIN_LAST_SIX_MONTHS: NO
BATHING: 0-->INDEPENDENT
TOILETING: 0-->INDEPENDENT
COGNITION: 0 - NO COGNITION ISSUES REPORTED
RETIRED_EATING: 0-->INDEPENDENT
SWALLOWING: 0-->SWALLOWS FOODS/LIQUIDS WITHOUT DIFFICULTY
RETIRED_COMMUNICATION: 0-->UNDERSTANDS/COMMUNICATES WITHOUT DIFFICULTY
TRANSFERRING: 0-->INDEPENDENT
DRESS: 0-->INDEPENDENT

## 2018-10-10 NOTE — PROGRESS NOTES
S:Patient presents due to  labor evaluation.  B:39w1d   Allergies: Ceclor [cefaclor] and Shellfish allergy  A:Vital Signs  Temp: 97.3  F (36.3  C)  Temp src: Oral  Resp: 16  Pulse: 77  BP: 126/81  BP Location: Left arm  Cervical Exam  Dilation: 3.5  Effacement (%): 85  Fetal Presentation: Cephalic  FHR  Monitor: External US  Variability: Moderate  Baseline Rate (Fetus A): 130 bpm  Baseline Classification: Normal  Accelerations: Present  Decelerations: None  FHTs are category 1.  Uterine Activity  Monitor: [place]  Contraction Frequency (minutes): 3-6  Contraction Duration (seconds): 50-90  Contraction Quality: Mild     No visits with results within 1 Day(s) from this visit.  Latest known visit with results is:    Prenatal Office Visit on 2018   Component Date Value     Group B Strep PCR Spec D* 2018 Vaginal Rectal      Group B Strep PCR 2018 Negative      Dr. ERNESTO Matos in department and orders received.  Plan includes; Encourage po fluids and ambulate for 1-2 hours; will assess for cervical changes. Reviewed with patient and she agrees with plan.   Bill of Rights given & questions discussed?: Yes  HC Your Rights handout : Informed and given    Prenatal Breastfeeding Education Toolkit provided for patient to review,helping her to make an informed decision on a feeding choice for her baby. Questions answered.    Oriented to room and call light.

## 2018-10-10 NOTE — DISCHARGE INSTRUCTIONS
If you have any questions or concerns after returning home, call your regular doctor or [facility] at [phone].    You were seen for: labor evaluation  You had the following care or tests: NST      Important things to know after discharge:    Drink 8-10 glasses of juice or water each day.    May take bath or shower.    Rest on your side--left side is best.    Eat lightly--soups, Jell-O, etc.  No spicy foods.    Activity:--walking is good during early labor but rest when you are tired.    CALL YOUR DOCTOR IF:    Your bag of [place] (membranes) break or you notice leaking in your underwear.     Bright red blood leaking from your vagina.    For first baby: contractions (tightenings) less than 5 minutes apart for one hour or more.    Other: Fetal Kick count daily    Follow up with your DOCTOR:  As scheduled or call the birthplace If contractions become closer and stronger.  [phone]    I have read these instructions and I understand the above information.

## 2018-10-10 NOTE — PROGRESS NOTES
S: Discharge from triage  A: Vital Signs  Temp: 97.3  F (36.3  C)  Temp src: Oral  Resp: 16  Pulse: 77  BP: 126/81  BP Location: Left arm  Cervical Exam  Dilation: 3.5  Effacement (%): 85  Station: 0  Cervical Consistency: soft  Fetal Presentation: Cephalic  FHR  Monitor: External US  Variability: Moderate  Baseline Rate (Fetus A): 135 bpm  Baseline Classification: Normal  Accelerations: Present  Decelerations: None  FHTs are category 1. Strip reviewed by Niki HERNÁNDEZ RN.  Uterine Activity  Monitor: [place]  Contraction Frequency (minutes): 4-7  Contraction Duration (seconds): 50-80  Contraction Quality: Mild  Resting Tone Palpated: Soft  Pain/Comfort  0-10 Pain Scale: 7  Pain Location: Perineum  Pain Orientation: Lower  Pain Intervention(s): Repositioned  Response to Labor/Coping: Able to relax between contractions  No visits with results within 1 Day(s) from this visit.  Latest known visit with results is:    Prenatal Office Visit on 09/19/2018   Component Date Value     Group B Strep PCR Spec D* 09/19/2018 Vaginal Rectal      Group B Strep PCR 09/19/2018 Negative      Dr. ERNESTO Matos informed of above and discharge order received.   R: Plan includes: Labor instuctions given Fetal kick count instructions given. Patient instructed to report any recurrence of above concerns to her primary care provider during clinic hours or [facility] at any other time. Patient verbalized understanding of education and agreement with plan. Agrees to call for any problems, questions or concerns.  Discharged undelivered via ambulatory  in stable condition with all belongings. Accompanied by  .

## 2018-10-10 NOTE — IP AVS SNAPSHOT
[facility]    [address] [address]    Phone:  [phone]    Fax:  [phone]                                       After Visit Summary   10/10/2018    Venessa Guillen    MRN: [id number]           After Visit Summary Signature Page     I have received my discharge instructions, and my questions have been answered. I have discussed any challenges I see with this plan with the nurse or doctor.    ..........................................................................................................................................  Patient/Patient Representative Signature      ..........................................................................................................................................  Patient Representative Print Name and Relationship to Patient    ..................................................               ................................................  Date                                   Time    ..........................................................................................................................................  Reviewed by Signature/Title    ...................................................              ..............................................  Date                                               Time          22EPIC Rev 08/18

## 2018-10-10 NOTE — PROGRESS NOTES
Patient up to ambulate in halls and took bath.  SVE no change. Patient states ctx are the same.  Dr. Matos notified, orders obtained.

## 2018-10-10 NOTE — IP AVS SNAPSHOT
MRN:[id number]                      After Visit Summary   10/10/2018    Venessa Guillen    MRN: [id number]           Thank you!     Thank you for choosing [facility] for your care. Our goal is always to provide you with excellent care. Hearing back from our patients is one way we can continue to improve our services. Please take a few minutes to complete the written survey that you may receive in the mail after you visit with us. Thank you!        Patient Information     Date Of Birth          1985        Designated Caregiver       Most Recent Value    Caregiver    Will someone help with your care after discharge? yes    Name of designated caregiver Ramos    Phone number of caregiver [phone]    Caregiver address same as pt      About your hospital stay     You were admitted on:  October 10, 2018 You last received care in the:  [facility]    You were discharged on:  October 10, 2018       Who to Call     For medical emergencies, please call 911.  For non-urgent questions about your medical care, please call your primary care provider or clinic, None          Attending Provider     Provider Specialty    Sylvie Matos MD OB/Gyn       Primary Care Provider Fax #    Physician No Ref-Primary [phone]      Your next 10 appointments already scheduled     Oct 16, 2018 11:00 AM CDT   MyChart OB-GYN Established [facility]atal [facility]    [address] [address] [phone]              Further instructions from your care team       If you have any questions or concerns after returning home, call your regular doctor or the Birth Place at [phone].    You were seen for: labor evaluation  You had the following care or tests: NST      Important things to know after discharge:    Drink 8-10 glasses of juice or water each day.    May take bath or shower.    Rest on your side--left side  is best.    Eat lightly--soups, Jell-O, etc.  No spicy foods.    Activity:--walking is good during early labor but rest when you are tired.    CALL YOUR DOCTOR IF:    Your bag of israel (membranes) break or you notice leaking in your underwear.     Bright red blood leaking from your vagina.    For first baby: contractions (tightenings) less than 5 minutes apart for one hour or more.    Other: Fetal Kick count daily    Follow up with your DOCTOR:  As scheduled or call the birthplace If contractions become closer and stronger.  [phone]    I have read these instructions and I understand the above information.         Pending Results     No orders found from 10/8/2018 to 10/11/2018.            Admission Information     Date & Time Provider Department Dept. Phone    10/10/2018 Sylvie Matos MD [facility] [phone]      Your Vitals Were     Blood Pressure Pulse Temperature Respirations Last Period       126/81 (BP Location: Left arm) 77 97.3  F (36.3  C) (Oral) 16 01/09/2018       [employer] Information     [employer] gives you secure access to your electronic health record. If you see a primary care provider, you can also send messages to your care team and make appointments. If you have questions, please call your primary care clinic.  If you do not have a primary care provider, please call [phone] and they will assist you.        Care EveryWhere ID     This is your Care EveryWhere ID. This could be used by other organizations to access your [facility] medical records  [id number]        Equal Access to Services     ANDRÉS HARDIN : Hadii hermann betts hadasho Soomaali, waaxda luqadaha, qaybta kaalmada adeegyada, lindsay heard. So [facility] [phone].    ATENCIÓN: Si habla español, tiene a noland disposición servicios gratuitos de asistencia lingüística. Llame al [phone].    We comply with applicable federal civil rights laws and [place] laws. We do not discriminate  on the basis of race, color, national origin, age, disability, sex, sexual orientation, or gender identity.               Review of your medicines      UNREVIEWED medicines. Ask your doctor about these medicines        Dose / Directions    docusate sodium 100 MG tablet   Commonly known as:  COLACE   Used for:  Constipation, unspecified constipation type        Dose:  100 mg   Take 100 mg by mouth daily   Quantity:  60 tablet   Refills:  1       EPINEPHrine 0.3 MG/0.3ML injection 2-pack   Commonly known as:  EPIPEN/ADRENACLICK/or ANY BX GENERIC EQUIV        Dose:  0.3 mg   Inject 0.3 mg into the muscle   Refills:  0       PRENATAL ADULT GUMMY/DHA/FA PO        Dose:  2 chew tab   Take 2 chew tab by mouth   Refills:  0         START taking        Dose / Directions    hydrOXYzine 50 MG capsule   Commonly known as:  VISTARIL        Dose:  50 mg   Take 1 capsule (50 mg) by mouth 3 times daily as needed for other (for sleep)   Quantity:  6 capsule   Refills:  0         CONTINUE these medicines which have NOT CHANGED        Dose / Directions    * breast pump Misc        Dose:  1 each   1 each as needed   Quantity:  1 each   Refills:  0       * breast pump Misc   Used for:  Encounter for prenatal care in third trimester of first pregnancy        Dose:  1 each   1 each as needed   Quantity:  1 each   Refills:  0       * breast pump Misc   Used for:  Encounter for prenatal care in third trimester of first pregnancy        Dose:  1 each   1 each as needed (breast feeding)   Quantity:  1 each   Refills:  0       * Notice:  This list has 3 medication(s) that are the same as other medications prescribed for you. Read the directions carefully, and ask your doctor or other care provider to review them with you.         Where to get your medicines      These medications were sent to [facility] [place], [place] - [address]  [address] [place] [address]     Phone:  [phone]     hydrOXYzine 50 MG capsule                 Protect others around you: Learn how to safely use, store and throw away your medicines at www.disposemymeds.org.             Medication List: This is a list of all your medications and when to take them. Check marks below indicate your daily home schedule. Keep this list as a reference.      Medications           Morning Afternoon Evening Bedtime As Needed    * breast pump Misc   1 each as needed                                * breast pump Misc   1 each as needed                                * breast pump Misc   1 each as needed (breast feeding)                                docusate sodium 100 MG tablet   Commonly known as:  COLACE   Take 100 mg by mouth daily                                EPINEPHrine 0.3 MG/0.3ML injection 2-pack   Commonly known as:  EPIPEN/ADRENACLICK/or ANY BX GENERIC EQUIV   Inject 0.3 mg into the muscle                                hydrOXYzine 50 MG capsule   Commonly known as:  VISTARIL   Take 1 capsule (50 mg) by mouth 3 times daily as needed for other (for sleep)                                PRENATAL ADULT GUMMY/DHA/FA PO   Take 2 chew tab by mouth                                * Notice:  This list has 3 medication(s) that are the same as other medications prescribed for you. Read the directions carefully, and ask your doctor or other care provider to review them with you.              More Information        Kick Counts    It s normal to worry about your baby s health. One way you can know your baby s doing well is to record the baby s movements once a day. This is called a kick count. Remember to take your kick count records to all your appointments with your healthcare provider.  How to count kicks  Here are tips for counting kicks:    Choose a time when the baby is active, such as after a meal.     Sit comfortably or lie on your side.     The first time the baby moves, write down the time.     Count each movement until the baby has moved 10 times. This can  take from 20 minutes to 2 hours.     Try to do it at the same time each day.  When to call your healthcare provider  Call your healthcare provider right away if you notice any of the following:    Your baby moves fewer than 10 times in 2 hours while you re doing kick counts.    Your baby moves much less often than on the days before.    You have not felt your baby move all day.  Date Last Reviewed: 12/1/2017 2000-2017 [employer]. [address] [place] [address], [place] [id number]. All rights reserved. This information is not intended as a substitute for professional medical care. Always follow your healthcare professional's instructions.

## 2018-10-11 ENCOUNTER — ANESTHESIA (OUTPATIENT)
Dept: OBGYN | Facility: CLINIC | Age: 33
End: 2018-10-11
Payer: COMMERCIAL

## 2018-10-11 ENCOUNTER — ANESTHESIA EVENT (OUTPATIENT)
Dept: OBGYN | Facility: CLINIC | Age: 33
End: 2018-10-11
Payer: COMMERCIAL

## 2018-10-11 ENCOUNTER — HOSPITAL ENCOUNTER (OUTPATIENT)
Facility: CLINIC | Age: 33
Discharge: HOME | End: 2018-10-11
Attending: OBSTETRICS & GYNECOLOGY | Admitting: OBSTETRICS & GYNECOLOGY
Payer: COMMERCIAL

## 2018-10-11 ENCOUNTER — HOSPITAL ENCOUNTER (INPATIENT)
Facility: CLINIC | Age: 33
LOS: 3 days | Discharge: HOME | End: 2018-10-14
Attending: OBSTETRICS & GYNECOLOGY | Admitting: OBSTETRICS & GYNECOLOGY
Payer: COMMERCIAL

## 2018-10-11 VITALS — DIASTOLIC BLOOD PRESSURE: 86 MMHG | TEMPERATURE: 97.9 F | SYSTOLIC BLOOD PRESSURE: 140 MMHG | RESPIRATION RATE: 16 BRPM

## 2018-10-11 PROBLEM — Z34.90 PREGNANCY: Status: ACTIVE | Noted: 2018-10-11

## 2018-10-11 LAB
ALBUMIN UR-MCNC: NEGATIVE MG/DL
APPEARANCE UR: CLEAR
BILIRUB UR QL STRIP: NEGATIVE
COLOR UR AUTO: ABNORMAL
GLUCOSE UR STRIP-MCNC: NEGATIVE MG/DL
HGB BLD-MCNC: 13.2 G/DL (ref 11.7–15.7)
HGB UR QL STRIP: NEGATIVE
KETONES UR STRIP-MCNC: NEGATIVE MG/DL
LEUKOCYTE ESTERASE UR QL STRIP: ABNORMAL
MUCOUS THREADS #/AREA URNS LPF: PRESENT /LPF
NITRATE UR QL: NEGATIVE
PH UR STRIP: 6 PH (ref 5–7)
RBC #/AREA URNS AUTO: <1 /HPF (ref 0–2)
RUPTURE OF FETAL MEMBRANES BY ROM PLUS: POSITIVE
SOURCE: ABNORMAL
SP GR UR STRIP: 1 (ref 1–1.03)
SQUAMOUS #/AREA URNS AUTO: <1 /HPF (ref 0–1)
UROBILINOGEN UR STRIP-MCNC: 0 MG/DL (ref 0–2)
WBC #/AREA URNS AUTO: 2 /HPF (ref 0–5)

## 2018-10-11 PROCEDURE — 40000809 ZZH STATISTIC NO DOCUMENTATION TO SUPPORT CHARGE

## 2018-10-11 PROCEDURE — 81001 URINALYSIS AUTO W/SCOPE: CPT | Performed by: OBSTETRICS & GYNECOLOGY

## 2018-10-11 PROCEDURE — 12000031 ZZH R&B OB CRITICAL

## 2018-10-11 PROCEDURE — 86900 BLOOD TYPING SEROLOGIC ABO: CPT | Performed by: OBSTETRICS & GYNECOLOGY

## 2018-10-11 PROCEDURE — 85018 HEMOGLOBIN: CPT | Performed by: OBSTETRICS & GYNECOLOGY

## 2018-10-11 PROCEDURE — 86850 RBC ANTIBODY SCREEN: CPT | Performed by: OBSTETRICS & GYNECOLOGY

## 2018-10-11 PROCEDURE — 25000132 ZZH RX MED GY IP 250 OP 250 PS 637: Performed by: OBSTETRICS & GYNECOLOGY

## 2018-10-11 PROCEDURE — 86901 BLOOD TYPING SEROLOGIC RH(D): CPT | Performed by: OBSTETRICS & GYNECOLOGY

## 2018-10-11 PROCEDURE — 12000027 ZZH R&B OB

## 2018-10-11 PROCEDURE — G0463 HOSPITAL OUTPT CLINIC VISIT: HCPCS

## 2018-10-11 PROCEDURE — 36415 COLL VENOUS BLD VENIPUNCTURE: CPT | Performed by: OBSTETRICS & GYNECOLOGY

## 2018-10-11 PROCEDURE — 86780 TREPONEMA PALLIDUM: CPT | Performed by: OBSTETRICS & GYNECOLOGY

## 2018-10-11 PROCEDURE — 84112 EVAL AMNIOTIC FLUID PROTEIN: CPT | Performed by: OBSTETRICS & GYNECOLOGY

## 2018-10-11 RX ORDER — CARBOPROST TROMETHAMINE 250 UG/ML
250 INJECTION, SOLUTION INTRAMUSCULAR
Status: DISCONTINUED | OUTPATIENT
Start: 2018-10-11 | End: 2018-10-12

## 2018-10-11 RX ORDER — NALOXONE HYDROCHLORIDE 0.4 MG/ML
.1-.4 INJECTION, SOLUTION INTRAMUSCULAR; INTRAVENOUS; SUBCUTANEOUS
Status: DISCONTINUED | OUTPATIENT
Start: 2018-10-11 | End: 2018-10-12

## 2018-10-11 RX ORDER — HYDROXYZINE HYDROCHLORIDE 50 MG/1
100 TABLET, FILM COATED ORAL ONCE
Status: COMPLETED | OUTPATIENT
Start: 2018-10-11 | End: 2018-10-11

## 2018-10-11 RX ORDER — ONDANSETRON 2 MG/ML
4 INJECTION INTRAMUSCULAR; INTRAVENOUS EVERY 6 HOURS PRN
Status: DISCONTINUED | OUTPATIENT
Start: 2018-10-11 | End: 2018-10-11 | Stop reason: HOSPADM

## 2018-10-11 RX ORDER — ACETAMINOPHEN 325 MG/1
650 TABLET ORAL EVERY 4 HOURS PRN
Status: DISCONTINUED | OUTPATIENT
Start: 2018-10-11 | End: 2018-10-12

## 2018-10-11 RX ORDER — ONDANSETRON 2 MG/ML
4 INJECTION INTRAMUSCULAR; INTRAVENOUS EVERY 6 HOURS PRN
Status: DISCONTINUED | OUTPATIENT
Start: 2018-10-11 | End: 2018-10-12

## 2018-10-11 RX ORDER — EPHEDRINE SULFATE 50 MG/ML
5 INJECTION, SOLUTION INTRAMUSCULAR; INTRAVENOUS; SUBCUTANEOUS
Status: DISCONTINUED | OUTPATIENT
Start: 2018-10-11 | End: 2018-10-12

## 2018-10-11 RX ORDER — ONDANSETRON 4 MG/1
4 TABLET, ORALLY DISINTEGRATING ORAL EVERY 6 HOURS PRN
Status: DISCONTINUED | OUTPATIENT
Start: 2018-10-11 | End: 2018-10-12

## 2018-10-11 RX ORDER — METHYLERGONOVINE MALEATE 0.2 MG/ML
200 INJECTION INTRAVENOUS
Status: DISCONTINUED | OUTPATIENT
Start: 2018-10-11 | End: 2018-10-12

## 2018-10-11 RX ORDER — HYDROXYZINE HYDROCHLORIDE 50 MG/1
50 TABLET, FILM COATED ORAL ONCE
Status: COMPLETED | OUTPATIENT
Start: 2018-10-11 | End: 2018-10-11

## 2018-10-11 RX ORDER — OXYTOCIN 10 [USP'U]/ML
10 INJECTION, SOLUTION INTRAMUSCULAR; INTRAVENOUS
Status: DISCONTINUED | OUTPATIENT
Start: 2018-10-11 | End: 2018-10-12

## 2018-10-11 RX ORDER — SODIUM CHLORIDE, SODIUM LACTATE, POTASSIUM CHLORIDE, CALCIUM CHLORIDE 600; 310; 30; 20 MG/100ML; MG/100ML; MG/100ML; MG/100ML
INJECTION, SOLUTION INTRAVENOUS CONTINUOUS
Status: DISCONTINUED | OUTPATIENT
Start: 2018-10-11 | End: 2018-10-12

## 2018-10-11 RX ORDER — OXYTOCIN/0.9 % SODIUM CHLORIDE 30/500 ML
100-340 PLASTIC BAG, INJECTION (ML) INTRAVENOUS CONTINUOUS PRN
Status: COMPLETED | OUTPATIENT
Start: 2018-10-11 | End: 2018-10-12

## 2018-10-11 RX ORDER — IBUPROFEN 800 MG/1
800 TABLET, FILM COATED ORAL
Status: DISCONTINUED | OUTPATIENT
Start: 2018-10-11 | End: 2018-10-12

## 2018-10-11 RX ORDER — NALBUPHINE HYDROCHLORIDE 10 MG/ML
2.5-5 INJECTION, SOLUTION INTRAMUSCULAR; INTRAVENOUS; SUBCUTANEOUS EVERY 6 HOURS PRN
Status: DISCONTINUED | OUTPATIENT
Start: 2018-10-11 | End: 2018-10-12

## 2018-10-11 RX ORDER — OXYCODONE AND ACETAMINOPHEN 5; 325 MG/1; MG/1
1 TABLET ORAL
Status: DISCONTINUED | OUTPATIENT
Start: 2018-10-11 | End: 2018-10-12

## 2018-10-11 RX ADMIN — HYDROXYZINE HYDROCHLORIDE 100 MG: 50 TABLET, FILM COATED ORAL at 01:39

## 2018-10-11 NOTE — PROGRESS NOTES
SVE unchanged 3.5/80/-1. Pt wishes to go home and get some rest. Dr. Escalera updated. Discharge order obtained. Pt given 100 mg vistaril for rest. Pt has vistaril perscription in pharmacy, will  tomorrow. Pt given discharge paperwork and reasons to return to clinic or BP. Pt ambulated to car with staff.     Justa Drew RN 10/11/2018 1:47 AM

## 2018-10-11 NOTE — PROGRESS NOTES
Late ENTRY:    Pt reports to BP with complaints of contractions every 20 minutes. Pt was here earlier in the day and sent home with unchanged cervix. Pt requesting UA.

## 2018-10-11 NOTE — IP AVS SNAPSHOT
MRN:[id number]                      After Visit Summary   10/11/2018    Venessa Guillen    MRN: [id number]           Thank you!     Thank you for choosing [facility] for your care. Our goal is always to provide you with excellent care. Hearing back from our patients is one way we can continue to improve our services. Please take a few minutes to complete the written survey that you may receive in the mail after you visit with us. Thank you!        Patient Information     Date Of Birth          1985        About your hospital stay     You were admitted on:  October 11, 2018 You last received care in the:  [facility]    You were discharged on:  October 11, 2018       Who to Call     For medical emergencies, please call 911.  For non-urgent questions about your medical care, please call your primary care provider or clinic, None          Attending Provider     Provider Specialty    Sylvie Matos MD OB/Gyn       Primary Care Provider Fax #    Physician No Ref-Primary [phone]      Your next 10 appointments already scheduled     Oct 16, 2018 11:00 AM CDT   [facility] OB-GYN Established Prenatal with Aniya Malik MD   [facility]    [address] [address]   [phone]              Further instructions from your care team       Discharge Instruction for Undelivered Patients      You were seen for: uti  We Consulted: ERNESTO Escalera  You had (Test or Medicine):UA, vistaril     Diet:   Drink 8 to 12 glasses of liquids (milk, juice, water) every day.      Call your provider if you notice:  Swelling in your face or increased swelling in your hands or legs.  Headaches that are not relieved by Tylenol (acetaminophen).  Changes in your vision (blurring: seeing spots or stars.)  Nausea (sick to your stomach) and vomiting (throwing up).   Weight gain of 5 pounds or more per week.  Heartburn that doesn't go away.  Signs  of bladder infection: pain when you urinate (use the toilet), need to go more often and more urgently.  The bag of israel (rupture of membranes) breaks, or you notice leaking in your underwear.  Bright red blood in your underwear.  Abdominal (lower belly) or stomach pain.  For first baby: Contractions (tightening) less than 5 minutes apart for one hour or more.  Increase or change in vaginal discharge (note the color and amount)      Follow-up:  As scheduled in the clinic          Pending Results     No orders found from 10/9/2018 to 10/12/2018.            Admission Information     Date & Time Provider Department Dept. Phone    10/11/2018 Sylvie Matos MD [facility] [phone]      Your Vitals Were     Last Period                   01/09/2018           MyChart Information     [employer] gives you secure access to your electronic health record. If you see a primary care provider, you can also send messages to your care team and make appointments. If you have questions, please call your primary care clinic.  If you do not have a primary care provider, please call [phone] and they will assist you.        Care EveryWhere ID     This is your Care EveryWhere ID. This could be used by other organizations to access your [facility] medical records  [id number]        Equal Access to Services     ANDRÉS HARDIN : Mary Beth bonillao Jena, waaxda luqadaha, qaybta kaalmada adeelizabethda, lindsay heard. So [facility] [phone].    ATENCIÓN: Si habla español, tiene a noland disposición servicios gratuitos de asistencia lingüística. Llame al [phone].    We comply with applicable federal civil rights laws and [place] laws. We do not discriminate on the basis of race, color, national origin, age, disability, sex, sexual orientation, or gender identity.               Review of your medicines      UNREVIEWED medicines. Ask your doctor about these medicines        Dose /  Directions    docusate sodium 100 MG tablet   Commonly known as:  COLACE   Used for:  Constipation, unspecified constipation type, Encounter for supervision of normal first pregnancy in third trimester        Dose:  100 mg   Take 100 mg by mouth daily   Quantity:  60 tablet   Refills:  1       EPINEPHrine 0.3 MG/0.3ML injection 2-pack   Commonly known as:  EPIPEN/ADRENACLICK/or ANY BX GENERIC EQUIV        Dose:  0.3 mg   Inject 0.3 mg into the muscle   Refills:  0       hydrOXYzine 50 MG capsule   Commonly known as:  VISTARIL   Used for:  Encounter for supervision of normal first pregnancy in third trimester        Dose:  50 mg   Take 1 capsule (50 mg) by mouth 3 times daily as needed for other (for sleep)   Quantity:  6 capsule   Refills:  0       PRENATAL ADULT GUMMY/DHA/FA PO        Dose:  2 chew tab   Take 2 chew tab by mouth   Refills:  0         CONTINUE these medicines which have NOT CHANGED        Dose / Directions    * breast pump Misc   Used for:  Encounter for supervision of normal first pregnancy in third trimester        Dose:  1 each   1 each as needed   Quantity:  1 each   Refills:  0       * breast pump Misc   Used for:  Encounter for prenatal care in third trimester of first pregnancy        Dose:  1 each   1 each as needed   Quantity:  1 each   Refills:  0       * breast pump Misc   Used for:  Encounter for prenatal care in third trimester of first pregnancy        Dose:  1 each   1 each as needed (breast feeding)   Quantity:  1 each   Refills:  0       * Notice:  This list has 3 medication(s) that are the same as other medications prescribed for you. Read the directions carefully, and ask your doctor or other care provider to review them with you.             Protect others around you: Learn how to safely use, store and throw away your medicines at www.disposemymeds.org.             Medication List: This is a list of all your medications and when to take them. Check marks below indicate your daily  home schedule. Keep this list as a reference.      Medications           Morning Afternoon Evening Bedtime As Needed    * breast pump Misc   1 each as needed                                * breast pump Misc   1 each as needed                                * breast pump Misc   1 each as needed (breast feeding)                                docusate sodium 100 MG tablet   Commonly known as:  COLACE   Take 100 mg by mouth daily                                EPINEPHrine 0.3 MG/0.3ML injection 2-pack   Commonly known as:  EPIPEN/ADRENACLICK/or ANY BX GENERIC EQUIV   Inject 0.3 mg into the muscle                                hydrOXYzine 50 MG capsule   Commonly known as:  VISTARIL   Take 1 capsule (50 mg) by mouth 3 times daily as needed for other (for sleep)                                PRENATAL ADULT GUMMY/DHA/FA PO   Take 2 chew tab by mouth                                * Notice:  This list has 3 medication(s) that are the same as other medications prescribed for you. Read the directions carefully, and ask your doctor or other care provider to review them with you.

## 2018-10-11 NOTE — IP AVS SNAPSHOT
[facility]    [address] [address]    Phone:  [phone]    Fax:  [phone]                                       After Visit Summary   10/11/2018    Venessa Guillen    MRN: [id number]           After Visit Summary Signature Page     I have received my discharge instructions, and my questions have been answered. I have discussed any challenges I see with this plan with the nurse or doctor.    ..........................................................................................................................................  Patient/Patient Representative Signature      ..........................................................................................................................................  Patient Representative Print Name and Relationship to Patient    ..................................................               ................................................  Date                                   Time    ..........................................................................................................................................  Reviewed by Signature/Title    ...................................................              ..............................................  Date                                               Time          22EPIC Rev 08/18

## 2018-10-11 NOTE — IP AVS SNAPSHOT
[facility]    [address] [address]    Phone:  [phone]    Fax:  [phone]                                       After Visit Summary   10/11/2018    Venessa Guillen    MRN: [id number]           After Visit Summary Signature Page     I have received my discharge instructions, and my questions have been answered. I have discussed any challenges I see with this plan with the nurse or doctor.    ..........................................................................................................................................  Patient/Patient Representative Signature      ..........................................................................................................................................  Patient Representative Print Name and Relationship to Patient    ..................................................               ................................................  Date                                   Time    ..........................................................................................................................................  Reviewed by Signature/Title    ...................................................              ..............................................  Date                                               Time          22EPIC Rev 08/18

## 2018-10-11 NOTE — IP AVS SNAPSHOT
MRN:[id number]                      After Visit Summary   10/11/2018    Venessa Guillen    MRN: [id number]           Thank you!     Thank you for choosing [facility] for your care. Our goal is always to provide you with excellent care. Hearing back from our patients is one way we can continue to improve our services. Please take a few minutes to complete the written survey that you may receive in the mail after you visit with us. Thank you!        Patient Information     Date Of Birth          1985        Designated Caregiver       Most Recent Value    Caregiver    Will someone help with your care after discharge? yes    Name of designated caregiver [facility]    Phone number of caregiver [phone]    Caregiver address same as pt      About your hospital stay     You were admitted on:  October 11, 2018 You last received care in the:  [facility]    You were discharged on:  October 14, 2018        Reason for your hospital stay       Maternity care                  Who to Call     For medical emergencies, please call 911.  For non-urgent questions about your medical care, please call your primary care provider or clinic, None          Attending Provider     Provider Specialty    Yojana Mc MD OB/Gyn       Primary Care Provider Fax #    Physician No Ref-Primary [phone]      After Care Instructions     Activity       Review discharge instructions            Diet       Resume previous diet            Discharge Instructions - Postpartum visit       Schedule postpartum visit with your provider and return to clinic in 6 weeks.    Your activity upon discharge: Activity as tolerated  No lifting restrictions  No driving for 2 weeks or no driving while on narcotic pain medications  Nothing in the vagina including sex, douching or tampons for 6 weeks    Call if you have any of the following:  Temperature > 100.4  Foul smelling vaginal discharge  Bleeding > 1 pad per hour x 2 hrs,    Pain not controlled by oral pain meds  Severe constipation or severe nausea or vomiting.                  Your next 10 appointments already scheduled     Oct 16, 2018 11:00 AM CDT   [facility] Established P[facility]atal with Aniya Malik MD   [facility]    [address]  [facility] [address]   [phone]              Further instructions from your care team           Postpartum Vaginal Delivery Instructions    Activity       Ask family and friends for help when you need it.    Do not place anything in your vagina for 6 weeks.    You are not restricted on other activities, but take it easy for a few weeks to allow your body to recover from delivery.  You are able to do any activities you feel up to that point.    No driving until you have stopped taking your pain medications (usually two weeks after delivery).     Call your health care provider if you have any of these symptoms:       Increased pain, swelling, redness, or fluid around your stiches from an episiotomy or perineal tear.    A fever above 100.4 F (38 C) with or without chills when placing a thermometer under your tongue.    You soak a sanitary pad with blood within 1 hour, or you see blood clots larger than a golf ball.    Bleeding that lasts more than 6 weeks.    Vaginal discharge that smells bad.    Severe pain, cramping or tenderness in your lower belly area.    A need to urinate more frequently (use the toilet more often), more urgently (use the toilet very quickly), or it burns when you urinate.    Nausea and vomiting.    Redness, swelling or pain around a vein in your leg.    Problems breastfeeding or a red or painful area on your breast.    Chest pain and cough or are gasping for air.    Problems coping with sadness, anxiety, or depression.  If you have any concerns about hurting yourself or the baby, call your provider immediately.     You have questions or concerns after you return home.      Keep your hands clean:  Always wash your hands before touching your perineal area and stitches.  This helps reduce your risk of infection.  If your hands aren't dirty, you may use an alcohol hand-rub to clean your hands. Keep your nails clean and short.        Pending Results     No orders found from 10/9/2018 to 10/12/2018.            Statement of Approval     Ordered          10/14/18 0927  I have reviewed and agree with all the recommendations and orders detailed in this document.  EFFECTIVE NOW     Approved and electronically signed by:  Yojana Mc MD             Admission Information     Date & Time Provider Department Dept. Phone    10/11/2018 Yojana Mc MD [facility] [phone]      Your Vitals Were     Blood Pressure Pulse Temperature Respirations Last Period Pulse Oximetry    116/72 56 97.5  F (36.4  C) (Oral) 16 01/09/2018 100%      MyChart Information     [facility] gives you secure access to your electronic health record. If you see a primary care provider, you can also send messages to your care team and make appointments. If you have questions, please call your primary care clinic.  If you do not have a primary care provider, please call [phone] and they will assist you.        Care EveryWhere ID     This is your Care EveryWhere ID. This could be used by other organizations to access your [facility] medical records  [id number]        Equal Access to Services     ANDRÉS HARDIN : Hadii hermann story Somegan, waaxda luqadaha, qaybta kaalmada anjali, lindsay heard. So [facility] [phone].    ATENCIÓN: Si habla español, tiene a noland disposición servicios gratuitos de asistencia lingüística. Llame al [phone].    We comply with applicable federal civil rights laws and [place] laws. We do not discriminate on the basis of race, color, national origin, age, disability, sex, sexual orientation, or gender identity.                Review of your medicines      START taking        Dose / Directions    acetaminophen 500 MG tablet   Commonly known as:  TYLENOL        Dose:  1000 mg   Take 2 tablets (1,000 mg) by mouth every 6 hours as needed for mild pain   Quantity:  100 tablet   Refills:  0       ibuprofen 600 MG tablet   Commonly known as:  ADVIL/MOTRIN        Dose:  600 mg   Take 1 tablet (600 mg) by mouth every 6 hours as needed for moderate pain   Quantity:  90 tablet   Refills:  1       senna-docusate 8.6-50 MG per tablet   Commonly known as:  SENOKOT-S;PERICOLACE        Dose:  1 tablet   Take 1 tablet by mouth 2 times daily   Quantity:  60 tablet   Refills:  1         CONTINUE these medicines which have NOT CHANGED        Dose / Directions    breast pump Misc   Used for:  Encounter for prenatal care in third trimester of first pregnancy        Dose:  1 each   1 each as needed   Quantity:  1 each   Refills:  0       docusate sodium 100 MG tablet   Commonly known as:  COLACE   Used for:  Constipation, unspecified constipation type, Encounter for supervision of normal first pregnancy in third trimester        Dose:  100 mg   Take 100 mg by mouth daily   Quantity:  60 tablet   Refills:  1       hydrOXYzine 50 MG capsule   Commonly known as:  VISTARIL   Used for:  Encounter for supervision of normal first pregnancy in third trimester        Dose:  50 mg   Take 1 capsule (50 mg) by mouth 3 times daily as needed for other (for sleep)   Quantity:  6 capsule   Refills:  0       PRENATAL ADULT GUMMY/DHA/FA PO        Dose:  2 chew tab   Take 2 chew tab by mouth daily   Refills:  0         STOP taking     EPINEPHrine 0.3 MG/0.3ML injection 2-pack   Commonly known as:  EPIPEN/ADRENACLICK/or ANY BX GENERIC EQUIV                Where to get your medicines      These medications were sent to [facility] [place], [place] - [address]  [address] [address]     Phone:  [phone]     acetaminophen 500 MG tablet     ibuprofen 600 MG tablet    senna-docusate 8.6-50 MG per tablet                Protect others around you: Learn how to safely use, store and throw away your medicines at www.disposemymeds.org.             Medication List: This is a list of all your medications and when to take them. Check marks below indicate your daily home schedule. Keep this list as a reference.      Medications           Morning Afternoon Evening Bedtime As Needed    acetaminophen 500 MG tablet   Commonly known as:  TYLENOL   Take 2 tablets (1,000 mg) by mouth every 6 hours as needed for mild pain                                breast pump Misc   1 each as needed                                docusate sodium 100 MG tablet   Commonly known as:  COLACE   Take 100 mg by mouth daily                                hydrOXYzine 50 MG capsule   Commonly known as:  VISTARIL   Take 1 capsule (50 mg) by mouth 3 times daily as needed for other (for sleep)                                ibuprofen 600 MG tablet   Commonly known as:  ADVIL/MOTRIN   Take 1 tablet (600 mg) by mouth every 6 hours as needed for moderate pain   Last time this was given:  800 mg on 10/14/2018  8:00 AM                                PRENATAL ADULT GUMMY/DHA/FA PO   Take 2 chew tab by mouth daily                                senna-docusate 8.6-50 MG per tablet   Commonly known as:  SENOKOT-S;PERICOLACE   Take 1 tablet by mouth 2 times daily   Last time this was given:  1 tablet on 10/14/2018  8:00 AM

## 2018-10-11 NOTE — DISCHARGE INSTRUCTIONS
Discharge Instruction for Undelivered Patients      You were seen for: uti  We Consulted: ERNESTO Escalera  You had (Test or Medicine):UA, vistaril     Diet:   Drink 8 to 12 glasses of liquids (milk, juice, water) every day.      Call your provider if you notice:  Swelling in your face or increased swelling in your hands or legs.  Headaches that are not relieved by Tylenol (acetaminophen).  Changes in your vision (blurring: seeing spots or stars.)  Nausea (sick to your stomach) and vomiting (throwing up).   Weight gain of 5 pounds or more per week.  Heartburn that doesn't go away.  Signs of bladder infection: pain when you urinate (use the toilet), need to go more often and more urgently.  The bag of israel (rupture of membranes) breaks, or you notice leaking in your underwear.  Bright red blood in your underwear.  Abdominal (lower belly) or stomach pain.  For first baby: Contractions (tightening) less than 5 minutes apart for one hour or more.  Increase or change in vaginal discharge (note the color and amount)      Follow-up:  As scheduled in the clinic

## 2018-10-11 NOTE — PROGRESS NOTES
LATE ENTRY:    UA negative. Pt taya every 5-12 minutes, palpate moderate. :Lasting 100-120 seconds.  FHT category 1, moderate variability, baseline 120, + accels, - decels. NST reactive, verfied by Julia JOSE.

## 2018-10-12 LAB
ABO + RH BLD: NORMAL
ABO + RH BLD: NORMAL
BLD GP AB SCN SERPL QL: NORMAL
BLOOD BANK CMNT PATIENT-IMP: NORMAL
SPECIMEN EXP DATE BLD: NORMAL

## 2018-10-12 PROCEDURE — 0KQM0ZZ REPAIR PERINEUM MUSCLE, OPEN APPROACH: ICD-10-PCS | Performed by: OBSTETRICS & GYNECOLOGY

## 2018-10-12 PROCEDURE — 25000128 H RX IP 250 OP 636: Performed by: OBSTETRICS & GYNECOLOGY

## 2018-10-12 PROCEDURE — 25000132 ZZH RX MED GY IP 250 OP 250 PS 637: Performed by: OBSTETRICS & GYNECOLOGY

## 2018-10-12 PROCEDURE — 72200001 ZZH LABOR CARE VAGINAL DELIVERY SINGLE

## 2018-10-12 PROCEDURE — 37000011 ZZH ANESTHESIA WARD SERVICE: Performed by: NURSE ANESTHETIST, CERTIFIED REGISTERED

## 2018-10-12 PROCEDURE — 40000671 ZZH STATISTIC ANESTHESIA CASE

## 2018-10-12 PROCEDURE — 12000031 ZZH R&B OB CRITICAL

## 2018-10-12 PROCEDURE — 00HU33Z INSERTION OF INFUSION DEVICE INTO SPINAL CANAL, PERCUTANEOUS APPROACH: ICD-10-PCS | Performed by: NURSE ANESTHETIST, CERTIFIED REGISTERED

## 2018-10-12 PROCEDURE — 3E0R3BZ INTRODUCTION OF ANESTHETIC AGENT INTO SPINAL CANAL, PERCUTANEOUS APPROACH: ICD-10-PCS | Performed by: NURSE ANESTHETIST, CERTIFIED REGISTERED

## 2018-10-12 PROCEDURE — 25000125 ZZHC RX 250: Performed by: OBSTETRICS & GYNECOLOGY

## 2018-10-12 PROCEDURE — 25000125 ZZHC RX 250: Performed by: NURSE ANESTHETIST, CERTIFIED REGISTERED

## 2018-10-12 PROCEDURE — 59400 OBSTETRICAL CARE: CPT | Performed by: OBSTETRICS & GYNECOLOGY

## 2018-10-12 PROCEDURE — 25000128 H RX IP 250 OP 636: Performed by: NURSE ANESTHETIST, CERTIFIED REGISTERED

## 2018-10-12 RX ORDER — MISOPROSTOL 200 UG/1
800 TABLET ORAL
Status: DISCONTINUED | OUTPATIENT
Start: 2018-10-12 | End: 2018-10-14 | Stop reason: HOSPADM

## 2018-10-12 RX ORDER — HYDROMORPHONE HYDROCHLORIDE 1 MG/ML
0.3 INJECTION, SOLUTION INTRAMUSCULAR; INTRAVENOUS; SUBCUTANEOUS
Status: DISCONTINUED | OUTPATIENT
Start: 2018-10-12 | End: 2018-10-14 | Stop reason: HOSPADM

## 2018-10-12 RX ORDER — LIDOCAINE HCL/EPINEPHRINE/PF 2%-1:200K
VIAL (ML) INJECTION PRN
Status: DISCONTINUED | OUTPATIENT
Start: 2018-10-12 | End: 2018-10-12

## 2018-10-12 RX ORDER — LANOLIN 100 %
OINTMENT (GRAM) TOPICAL
Status: DISCONTINUED | OUTPATIENT
Start: 2018-10-12 | End: 2018-10-14 | Stop reason: HOSPADM

## 2018-10-12 RX ORDER — METHYLERGONOVINE MALEATE 0.2 MG/ML
200 INJECTION INTRAVENOUS
Status: DISCONTINUED | OUTPATIENT
Start: 2018-10-12 | End: 2018-10-14 | Stop reason: HOSPADM

## 2018-10-12 RX ORDER — ACETAMINOPHEN 325 MG/1
650 TABLET ORAL EVERY 4 HOURS PRN
Status: DISCONTINUED | OUTPATIENT
Start: 2018-10-12 | End: 2018-10-14 | Stop reason: HOSPADM

## 2018-10-12 RX ORDER — LIDOCAINE HYDROCHLORIDE 10 MG/ML
INJECTION, SOLUTION INFILTRATION; PERINEURAL PRN
Status: DISCONTINUED | OUTPATIENT
Start: 2018-10-12 | End: 2018-10-12

## 2018-10-12 RX ORDER — AMOXICILLIN 250 MG
1 CAPSULE ORAL 2 TIMES DAILY
Status: DISCONTINUED | OUTPATIENT
Start: 2018-10-12 | End: 2018-10-14 | Stop reason: HOSPADM

## 2018-10-12 RX ORDER — TERBUTALINE SULFATE 1 MG/ML
0.25 INJECTION, SOLUTION SUBCUTANEOUS
Status: DISCONTINUED | OUTPATIENT
Start: 2018-10-12 | End: 2018-10-12

## 2018-10-12 RX ORDER — NALOXONE HYDROCHLORIDE 0.4 MG/ML
.1-.4 INJECTION, SOLUTION INTRAMUSCULAR; INTRAVENOUS; SUBCUTANEOUS
Status: DISCONTINUED | OUTPATIENT
Start: 2018-10-12 | End: 2018-10-14 | Stop reason: HOSPADM

## 2018-10-12 RX ORDER — AMOXICILLIN 250 MG
2 CAPSULE ORAL 2 TIMES DAILY
Status: DISCONTINUED | OUTPATIENT
Start: 2018-10-12 | End: 2018-10-14 | Stop reason: HOSPADM

## 2018-10-12 RX ORDER — IBUPROFEN 800 MG/1
800 TABLET, FILM COATED ORAL EVERY 6 HOURS PRN
Status: DISCONTINUED | OUTPATIENT
Start: 2018-10-12 | End: 2018-10-14 | Stop reason: HOSPADM

## 2018-10-12 RX ORDER — SODIUM CHLORIDE, SODIUM LACTATE, POTASSIUM CHLORIDE, CALCIUM CHLORIDE 600; 310; 30; 20 MG/100ML; MG/100ML; MG/100ML; MG/100ML
INJECTION, SOLUTION INTRAVENOUS CONTINUOUS
Status: DISCONTINUED | OUTPATIENT
Start: 2018-10-12 | End: 2018-10-12

## 2018-10-12 RX ORDER — OXYTOCIN/0.9 % SODIUM CHLORIDE 30/500 ML
1-24 PLASTIC BAG, INJECTION (ML) INTRAVENOUS CONTINUOUS
Status: DISCONTINUED | OUTPATIENT
Start: 2018-10-12 | End: 2018-10-12

## 2018-10-12 RX ORDER — HYDROXYZINE HYDROCHLORIDE 50 MG/1
50-100 TABLET, FILM COATED ORAL EVERY 6 HOURS PRN
Status: DISCONTINUED | OUTPATIENT
Start: 2018-10-12 | End: 2018-10-12

## 2018-10-12 RX ORDER — BISACODYL 10 MG
10 SUPPOSITORY, RECTAL RECTAL DAILY PRN
Status: DISCONTINUED | OUTPATIENT
Start: 2018-10-14 | End: 2018-10-14 | Stop reason: HOSPADM

## 2018-10-12 RX ORDER — OXYTOCIN/0.9 % SODIUM CHLORIDE 30/500 ML
100 PLASTIC BAG, INJECTION (ML) INTRAVENOUS CONTINUOUS
Status: DISCONTINUED | OUTPATIENT
Start: 2018-10-12 | End: 2018-10-14 | Stop reason: HOSPADM

## 2018-10-12 RX ORDER — BUPIVACAINE HYDROCHLORIDE 2.5 MG/ML
INJECTION, SOLUTION EPIDURAL; INFILTRATION; INTRACAUDAL PRN
Status: DISCONTINUED | OUTPATIENT
Start: 2018-10-12 | End: 2018-10-12

## 2018-10-12 RX ORDER — OXYTOCIN 10 [USP'U]/ML
10 INJECTION, SOLUTION INTRAMUSCULAR; INTRAVENOUS
Status: DISCONTINUED | OUTPATIENT
Start: 2018-10-12 | End: 2018-10-14 | Stop reason: HOSPADM

## 2018-10-12 RX ORDER — LIDOCAINE 40 MG/G
CREAM TOPICAL
Status: DISCONTINUED | OUTPATIENT
Start: 2018-10-12 | End: 2018-10-12

## 2018-10-12 RX ORDER — HYDROCORTISONE 2.5 %
CREAM (GRAM) TOPICAL 3 TIMES DAILY PRN
Status: DISCONTINUED | OUTPATIENT
Start: 2018-10-12 | End: 2018-10-14 | Stop reason: HOSPADM

## 2018-10-12 RX ORDER — OXYCODONE HYDROCHLORIDE 5 MG/1
5 TABLET ORAL EVERY 4 HOURS PRN
Status: DISCONTINUED | OUTPATIENT
Start: 2018-10-12 | End: 2018-10-14 | Stop reason: HOSPADM

## 2018-10-12 RX ORDER — OXYTOCIN/0.9 % SODIUM CHLORIDE 30/500 ML
340 PLASTIC BAG, INJECTION (ML) INTRAVENOUS CONTINUOUS PRN
Status: DISCONTINUED | OUTPATIENT
Start: 2018-10-12 | End: 2018-10-14 | Stop reason: HOSPADM

## 2018-10-12 RX ADMIN — SODIUM CHLORIDE, POTASSIUM CHLORIDE, SODIUM LACTATE AND CALCIUM CHLORIDE: 600; 310; 30; 20 INJECTION, SOLUTION INTRAVENOUS at 04:25

## 2018-10-12 RX ADMIN — SENNOSIDES AND DOCUSATE SODIUM 1 TABLET: 8.6; 5 TABLET ORAL at 14:31

## 2018-10-12 RX ADMIN — LIDOCAINE HYDROCHLORIDE 50 MG: 10 INJECTION, SOLUTION INFILTRATION; PERINEURAL at 03:39

## 2018-10-12 RX ADMIN — SENNOSIDES AND DOCUSATE SODIUM 1 TABLET: 8.6; 5 TABLET ORAL at 20:28

## 2018-10-12 RX ADMIN — SODIUM CHLORIDE, POTASSIUM CHLORIDE, SODIUM LACTATE AND CALCIUM CHLORIDE: 600; 310; 30; 20 INJECTION, SOLUTION INTRAVENOUS at 09:30

## 2018-10-12 RX ADMIN — SODIUM CHLORIDE, POTASSIUM CHLORIDE, SODIUM LACTATE AND CALCIUM CHLORIDE 1000 ML: 600; 310; 30; 20 INJECTION, SOLUTION INTRAVENOUS at 03:21

## 2018-10-12 RX ADMIN — BUPIVACAINE HYDROCHLORIDE 12 ML/HR: 7.5 INJECTION, SOLUTION EPIDURAL; RETROBULBAR at 03:51

## 2018-10-12 RX ADMIN — IBUPROFEN 800 MG: 800 TABLET ORAL at 20:28

## 2018-10-12 RX ADMIN — LIDOCAINE HYDROCHLORIDE,EPINEPHRINE BITARTRATE 5 ML: 20; .005 INJECTION, SOLUTION EPIDURAL; INFILTRATION; INTRACAUDAL; PERINEURAL at 03:45

## 2018-10-12 RX ADMIN — OXYTOCIN-SODIUM CHLORIDE 0.9% IV SOLN 30 UNIT/500ML 340 ML/HR: 30-0.9/5 SOLUTION at 11:43

## 2018-10-12 RX ADMIN — BUPIVACAINE HYDROCHLORIDE 10 ML: 2.5 INJECTION, SOLUTION EPIDURAL; INFILTRATION; INTRACAUDAL at 03:48

## 2018-10-12 RX ADMIN — OXYTOCIN-SODIUM CHLORIDE 0.9% IV SOLN 30 UNIT/500ML 2 MILLI-UNITS/MIN: 30-0.9/5 SOLUTION at 05:23

## 2018-10-12 RX ADMIN — HYDROXYZINE HYDROCHLORIDE 50 MG: 50 TABLET, FILM COATED ORAL at 01:46

## 2018-10-12 RX ADMIN — IBUPROFEN 800 MG: 800 TABLET ORAL at 14:31

## 2018-10-12 NOTE — PROGRESS NOTES
Pt reports she is having difficulty getting comfortable and is requesting epidural placement.  Pt up to bathroom, IV fluid bolus infusing, consent prepared.  Micheal DUONG CRNA notified.

## 2018-10-12 NOTE — PROGRESS NOTES
Verified that provider discussed with Venessa Guillen, the following; indications; the agents and methods of labor augmentation, including risks, benefits, and alternative approaches; and the possible need for  birth. EFW is AGA.  The Labor Induction:what you need to know information sheet was made available to her. Questions and concerns were addressed and patient agrees to above if necessary during the course of her labor.      Pt reports she understands reason for pitocin for augmentation and agrees with plan to begin augmentation.

## 2018-10-12 NOTE — PLAN OF CARE
Problem: Labor (Cervical Ripen, Induct, Augment) (Adult,Obstetrics,Pediatric)  Goal: Signs and Symptoms of Listed Potential Problems Will be Absent, Minimized or Managed (Labor)  Signs and symptoms of listed potential problems will be absent, minimized or managed by discharge/transition of care (reference Labor (Cervical Ripen, Induct, Augment) (Adult,Obstetrics,Pediatric) CPG).   Outcome: Improving  VSS, pt remains afebrile.   Pt denies pain and is able to sleep with epidural in place.    Mak in place, patent, draining clear yellow urine.  SVE at 0530 6/90/0.  FHR Cat 1.    Contractions occurring every 7-9 minutes; lasting 50 seconds to 6 minutes.  IV fluid bolus infusing.  Contractions becoming shorter since start of bolus.  Pt on right side with peanut ball in place.    Oxytocin infusing at 2mL/hr.   Pt able to rest at this time.  Will continue to monitor.

## 2018-10-12 NOTE — ANESTHESIA PROCEDURE NOTES
Peripheral nerve/Neuraxial procedure note : epidural catheter  Pre-Procedure  Performed by  ARNAUD BOONE   Location: OB      Pre-Anesthestic Checklist: patient identified, IV checked, risks and benefits discussed, informed consent, monitors and equipment checked, pre-op evaluation and at physician/surgeon's request    Timeout  Correct Patient: Yes   Correct Procedure: Yes   Correct Site: Yes   Correct Laterality: N/A   Correct Position: Yes   Site Marked: N/A   .   Procedure Documentation    .    Procedure:    Epidural catheter.  Insertion Site:L4-5  (midline approach) Injection technique: LORT saline   Local skin infiltrated with mL of 1% lidocaine.       Patient Prep;mask, sterile gloves, patient draped.  .  Needle: Touhy needle Needle Gauge: 17.    Needle Length (Inches) 3.5  # of attempts: 1 and # of redirects:  .   Catheter: 19 G . .  Catheter threaded easily  4 cm epidural space.  7 cm at skin.   .    Assessment/Narrative  Paresthesias: No.  .  .  Aspiration negative for heme or CSF  . Test dose of mL lidocaine 1.5% w/ 1:200,000 epinephrine at. Test dose negative for signs of intravascular, subdural or intrathecal injection. Sensory Level Left: T6  Sensory Level Right: T6  Comments:  VAS pain score prior to epidural:10    VAS pain score after epidural:0    Pt. Tolerated well, FHR stable.

## 2018-10-12 NOTE — PLAN OF CARE
Prolonged decels x 2, moderate variability and accels present.  Dr. Mc to bedside, patient is pushing.

## 2018-10-12 NOTE — PROGRESS NOTES
Pt has been ambulating around unit throughout the evening.  Rating contractions 8-9; breathing well through contractions and able to relax between contractions.  SVE at 0130 5/85/-1.  Bloody show present.  Contractions anywhere from 3-9 minutes apart, lasting  seconds; R Cat 1.   Dr. Malik updated.  Plan to continue to allow pt to labor.  If contraction pattern has not progressed, plan to start pitocin at 0600.    Vistaril administered for comfort.  Pt up to walk.  Plan discussed with pt and she agrees with plan.    Will continue to monitor closely.  Questions encouraged and answered.

## 2018-10-12 NOTE — H&P
[facility] Labor and Delivery History and Physical    Venessa Guillen MRN# [id number]   Age: 33 year old YOB: 1985     Date of Admission:  10/11/2018    Primary care provider: No Ref-Primary, Physician           Chief Complaint:   Venessa Guillen is a 33 year old female who is 39w3d pregnant and being admitted for active labor management.          Pregnancy history:     OBSTETRIC HISTORY:    Obstetric History       T1      L1     SAB0   TAB0   Ectopic0   Multiple0   Live Births1       # Outcome Date GA Lbr Garry/2nd Weight Sex Delivery Anes PTL Lv   1 Term 10/12/18 39w3d 17:10 / 01:17 8 lb 5 oz (3.771 kg) M Vag-Spont EPI N WINSTON      Name: SILVIA,BABYKrystin ZUNIGA      Apgar1:  9                Apgar5: 9          EDC: Estimated Date of Delivery: 10/16/18    Prenatal Labs:   Lab Results   Component Value Date    ABO A 10/11/2018    RH Pos 10/11/2018    AS Neg 10/11/2018    HEPBANG Nonreactive 2018    CHPCRT Negative 2018    GCPCRT Negative 2018    TREPAB Negative 2018    HGB 13.2 10/11/2018       GBS Status:   Lab Results   Component Value Date    GBS Negative 2018       Active Problem List  Patient Active Problem List   Diagnosis     CARDIOVASCULAR SCREENING; LDL GOAL LESS THAN 160     Prenatal care, first pregnancy     Supervision of normal first pregnancy     Pregnancy      (normal spontaneous vaginal delivery)       Medication Prior to Admission  Prescriptions Prior to Admission   Medication Sig Dispense Refill Last Dose     docusate sodium (COLACE) 100 MG tablet Take 100 mg by mouth daily 60 tablet 1 Past Week at Unknown time     hydrOXYzine (VISTARIL) 50 MG capsule Take 1 capsule (50 mg) by mouth 3 times daily as needed for other (for sleep) 6 capsule 0 10/10/2018 at Unknown time     Prenatal MV & Min w/FA-DHA (PRENATAL ADULT GUMMY/DHA/FA PO) Take 2 chew tab by mouth   10/11/2018 at Unknown time     EPINEPHrine (EPIPEN/ADRENACLICK/OR ANY BX GENERIC  EQUIV) 0.3 MG/0.3ML injection 2-pack Inject 0.3 mg into the muscle as needed for anaphylaxis    Unknown at Unknown time     Misc. Devices (BREAST PUMP) MISC 1 each as needed (Patient not taking: Reported on 10/3/2018) 1 each 0 future   .        Maternal Past Medical History:     Past Medical History:   Diagnosis Date     Chickenpox                        Family History:     Family History   Problem Relation Age of Onset     Substance Abuse Father      recovered A&D     Hypertension Maternal Grandmother      Prostate Cancer Maternal Grandfather      Skin Cancer Maternal Grandfather      BCC     Diabetes Maternal Grandfather      Heart Surgery Maternal Grandfather      bypass     Lung Cancer Paternal Grandmother      Diabetes Paternal Grandmother      Heart Surgery Paternal Grandmother      bypass     Prostate Cancer Paternal Grandfather      Family history reviewed and updated [facility]            Social History:     Social History   Substance Use Topics     Smoking status: Never Smoker     Smokeless tobacco: Never Used     Alcohol use Yes      Comment: occas- quit with pregnancy            Review of Systems:   The Review of Systems is negative other than noted in the HPI          Physical Exam:   Vitals were reviewed  Initial vitals were reviewed  All vitals stable  Constitutional:   awake, alert, cooperative, no apparent distress, and appears stated age     Lungs:   No increased work of breathing, good air exchange, clear to auscultation bilaterally, no crackles or wheezing     Cardiovascular:   normal S1 and S2      Cervix:   Membranes: intact   Dilation: 4   Effacement: 80%   Station:-1   Consistency: soft   Position: Mid  Presentation:Cephalic  Fetal Heart Rate Tracing: reactive and reassuring, Tier 1 (normal)  Tocometer: external monitor and frequency q 5-7 minutes                       Assessment:   Venessa Guillen is a 39w3d pregnant female admitted with active labor management.          Plan:   Admit - see IP  orders  Pain medication upon request  Anticipate   Labor augmentation with Pitocin as needed    Aniya Malik MD

## 2018-10-12 NOTE — PLAN OF CARE
Mother and baby transferred to postpartum unit at 1430 via ambulatory and bassinet after completion of immediate recovery period. Patient oriented to room and plan of care. Mother and baby bonding well and in stable condition upon transfer.

## 2018-10-12 NOTE — PROGRESS NOTES
Assuming care for Dr. Malik at 0800; pt admitted last PM in spontaneous labor after long latent phase; SROM clear fluid; GBS negative; pitocin augmentation ongoing  Epidural in place and effective for labor analgesia    [place]op[place]:  EFW 8.5 lbs.    Cervix stretches to 7cm/90/-1; large return of sl bloody fluid    EFM: tier 1    A: 39 wk3d IUP, spontaneous labor    P: continue current care; anticipate vaginal delivery  Yojana Mc MD  [facility]

## 2018-10-12 NOTE — PLAN OF CARE
Problem: Labor (Cervical Ripen, Induct, Augment) (Adult,Obstetrics,Pediatric)  Goal: Signs and Symptoms of Listed Potential Problems Will be Absent, Minimized or Managed (Labor)  Signs and symptoms of listed potential problems will be absent, minimized or managed by discharge/transition of care (reference Labor (Cervical Ripen, Induct, Augment) (Adult,Obstetrics,Pediatric) CPG).   Outcome: Improving  S: Admit to Inpatient   A: Vital Signs  Temp: 98  F (36.7  C)  Temp src: Oral  Resp: 16  BP: 123/77  Cervical Exam  Dilation: 4  Effacement (%): 85  Station: -1  Cervical Consistency: soft  Cervical Position: Middle  Pain/Comfort  0-10 Pain Scale: 8 (with contractions)  Pain Intervention(s): Ambulation/increased activity  Response to Labor/Coping: Able to relax between contractions, Breathing well through contractions, States is coping  FHR  Monitor: External US  Variability: Moderate  Baseline Rate (Fetus A): 140 bpm  Baseline Classification: Normal  Accelerations: Present  Decelerations: None  FHTs are category 1.  Uterine Activity  Monitor: Browns  Contraction Frequency (minutes): 5-7  Contraction Duration (seconds):   Contraction Quality: Moderate  Resting Tone Palpated: Soft  Admission on 10/11/2018   Component Date Value     Rupture of Fetal Membran* 10/11/2018 Positive*   Admission on 10/11/2018, Discharged on 10/11/2018   Component Date Value     Color Urine 10/11/2018 Straw      Appearance Urine 10/11/2018 Clear      Glucose Urine 10/11/2018 Negative      Bilirubin Urine 10/11/2018 Negative      Ketones Urine 10/11/2018 Negative      Specific Gravity Urine 10/11/2018 1.004      Blood Urine 10/11/2018 Negative      pH Urine 10/11/2018 6.0      Protein Albumin Urine 10/11/2018 Negative      Urobilinogen mg/dL 10/11/2018 0.0      Nitrite Urine 10/11/2018 Negative      Leukocyte Esterase Urine 10/11/2018 Trace*     Source 10/11/2018 Midstream Urine      WBC Urine 10/11/2018 2      RBC Urine 10/11/2018 <1       Squamous Epithelial /HPF* 10/11/2018 <1      Mucous Urine 10/11/2018 Present*     Dr. MEG Malik informed of above and admission orders received.   R: Plan of care reviewed with patient. Oriented to room. Reviewed resource binder, [facility] book and paperwork to complete.  Pt unsure of pain plan at this point.  Reviewed options with her.  Pt is up walking with spouse now - reports pain is better when up walking.  Will place IV when pt returns; discussed possibility of pitocin augmentation.  Pt agrees with plan.  Questions encouraged and answered.

## 2018-10-12 NOTE — L&D DELIVERY NOTE
"Delivery Summary    Venessa Guillen MRN# [id number]   Age: 33 year old YOB: 1985     ASSESSMENT & PLAN: 32 y/o  admitted in spontaneous labor with SROM; pitocin was utilized to augment labor when contractions slowed down; epidural for labor analgesia  She then had normal stage 1 progress and  liveborn male over 2nd degree perineal laceration, repaired with 3-0 Monocryl suture  Placenta spontaneous, intact  EBL 50cc  \"Pacheco\"  Yojana Mc MD  [facility]          Labor Event Times    Labor onset date:  10/11/18 Onset time:   5:15 PM   Dilation complete date:  10/12/18 Complete time:  10:25 AM   Start pushing date/time:  10/12/2018 1112            Labor Events     labor?:  No      Rupture identifier:  Rupture 1   Rupture date/time: 10/11/18 1715   Rupture type:  Spontaneous rupture of membranes occuring during spontaneous labor or augmentation   Fluid color:  Clear   Fluid odor:  Normal      1:1 continuous labor support provided by?:  RN          Delivery/Placenta Date and Time    Delivery Date:  10/12/18 Delivery Time:  11:42 AM   Placenta Date/Time:  10/12/2018 11:45 AM   Oxytocin given at the time of delivery:  after delivery of baby      Vaginal Counts    Initial count performed by 2 team members:   Two Team Members   KEMAL Mc          Needles Suture Osteen Sponges Instruments   Initial counts 2  5    Added to count  1     Final counts 2 1 5       Placed during labor Accounted for at the end of labor   NA    NA    NA       Final count performed by 2 team members:   Two Team Members   Dr Alexandro Lerner         Final count correct?:  Yes         Apgars    Living status:  Living    1 Minute 5 Minute 10 Minute 15 Minute 20 Minute   Skin color: 1  1       Heart rate: 2  2       Reflex irritability: 2  2       Muscle tone: 2  2       Respiratory effort: 2  2       Total: 9  9          Apgars assigned by:  KEMAL THOMAS      Cord    Vessels:  3 Vessels " Complications:  None   Cord Blood Disposition:  Lab Gases Sent?:  No         [place] Resuscitation    Methods:  None         Skin to Skin and Feeding Plan    Skin to skin initiation date/time: 10/12/18 1143   Skin to skin with:  Mother   Skin to skin end date/time:     How do you plan to feed your baby:  Breastfeeding      Labor Events and Shoulder Dystocia    Fetal Tracing Prior to Delivery:  Category 2   Shoulder dystocia present?:  Neg            Delivery (Maternal) (Provider to Complete) ([id number])    Episiotomy:  None   Perineal lacerations:  2nd Repaired?:  Yes   Vaginal laceration?:  No    Cervical laceration?:  No    Est. blood loss (mL):  50   Number of repair packets:  1         Mother's Information  Mother: Venessa Guillen #[id number]    Start of Mother's Information     IO Blood Loss  10/11/18 1715 - 10/12/18 1157    Mom's I/O Activity            End of Mother's Information  Mother: Venessa Guillen #[id number]            Delivery - Provider to Complete ([id number])    Delivering clinician:  YOJANA RICKS   Attempted Delivery Types (Choose all that apply):  Spontaneous Vaginal Delivery   Delivery Type (Choose the 1 that will go to the Birth History):  Vaginal, Spontaneous Delivery                     Other personnel:   Provider Role   ALIDA THOMAS Delivery Nurse   ILIR GOOD Charge Nurse            Placenta    Delayed Cord Clamping:  Done   Date/Time:  10/12/2018 11:45 AM   Removal:  Spontaneous   Disposition:  Hospital disposal      Anesthesia    Method:  Epidural         Presentation and Position    Presentation:  Vertex   Position:  Right Occiput Anterior                    Yojana Ricks MD

## 2018-10-12 NOTE — PLAN OF CARE
S:Delivery  B:Augmented  Labor,39w3d    Lab Results   Component Value Date    GBS Negative 2018    with antibiotic treatment not indicated 4 hours prior to delivery.  A: Patient delivered   lac 2nd degree at 1142 with Dr. SAMANTHA Mc in attendance and baby placed on mother's abdomen for delayed cord clamping. Baby dried and stimulated. Baby placed  skin to skin @ 1143.. Apgars 9/9.  IV infusion of Oxytocin  infused. Placenta removal spontaneous. See Flowsheet for VS and PP checks. Labor care plan goals met, transition now to postpartum care.  R: Expect routine postpartum care. Anticipate first feeding within the hour or whenever infant displays feeding cues. Continue skin to skin. Prior discussion with mother indicates that feeding plan is Breast feeding . Educated mother on importance of exclusive breastfeeding, expected feeding readiness cues and encouraged her to observe for these cues while rooming in. Informed her that breastfeeding assistance would be provided.

## 2018-10-13 LAB — T PALLIDUM AB SER QL: NONREACTIVE

## 2018-10-13 PROCEDURE — 25000132 ZZH RX MED GY IP 250 OP 250 PS 637: Performed by: OBSTETRICS & GYNECOLOGY

## 2018-10-13 PROCEDURE — 12000027 ZZH R&B OB

## 2018-10-13 RX ORDER — IBUPROFEN 600 MG/1
600 TABLET, FILM COATED ORAL EVERY 6 HOURS PRN
Qty: 90 TABLET | Refills: 1 | Status: SHIPPED | OUTPATIENT
Start: 2018-10-13 | End: 2020-04-10

## 2018-10-13 RX ORDER — ACETAMINOPHEN 500 MG
1000 TABLET ORAL EVERY 6 HOURS PRN
Qty: 100 TABLET | Refills: 0 | Status: SHIPPED | OUTPATIENT
Start: 2018-10-13

## 2018-10-13 RX ORDER — AMOXICILLIN 250 MG
1 CAPSULE ORAL 2 TIMES DAILY
Qty: 60 TABLET | Refills: 1 | Status: SHIPPED | OUTPATIENT
Start: 2018-10-13 | End: 2020-04-10

## 2018-10-13 RX ADMIN — IBUPROFEN 800 MG: 800 TABLET ORAL at 09:12

## 2018-10-13 RX ADMIN — SENNOSIDES AND DOCUSATE SODIUM 1 TABLET: 8.6; 5 TABLET ORAL at 09:13

## 2018-10-13 RX ADMIN — IBUPROFEN 800 MG: 800 TABLET ORAL at 16:15

## 2018-10-13 NOTE — PROGRESS NOTES
Pt is following her postpartum pathway with VSS, assessments WNL, is tolerating her activities and is able to care for her . Father of the baby is present, supportive and involved with infant cares. Pt reports mild perineum soreness for which she is taking Ibuprofen for along with tub soaks and ice pads. Pt reports adequate control. Pt has red tender nipples. Sore nipple prevention management discussed. Pt is using Lanolin ointment.

## 2018-10-13 NOTE — PROGRESS NOTES
PPD#1  Doing well. Pain well controlled on oral pain medication. Tolerating regular diet. Voiding well. Ambulating without difficulty. Lochia is scant. Breast feeding.   Vitals:    10/12/18 1330 10/12/18 1345 10/12/18 1730 10/12/18 2347   BP: 113/56 115/68 100/60 100/63   Resp:    18   Temp:   97.4  F (36.3  C) 97.6  F (36.4  C)   TempSrc:   Oral Oral   SpO2:    100%     General Appearance: NAD  Abdomen: Soft, NT, ND. Fundus firm at U-2  Extremities: NT, trace edema    Hemoglobin   Date Value Ref Range Status   10/11/2018 13.2 11.7 - 15.7 g/dL Final   2018 12.8 11.7 - 15.7 g/dL Final   ]    A/P: 33 year old  PPD#1 s/p . Hemodynamically stable.   -- routine PP cares  -- encouraged to ambulate  -- anticipate discharge home today or tomorrow    Lisa Singh MD  [facility]

## 2018-10-13 NOTE — PLAN OF CARE
Problem: Patient Care Overview  Goal: Plan of Care/Patient Progress Review  Outcome: Improving  Patient progressing well.  Ambulating, Eating and voiding well. Independent with mother/baby cares. Bonding well with infant.  Breast feeding is going well, baby is latching well and feeding for good lengths of time. Patient rates Pain negligible.  Taking Ibuprofen when due with good relief.  Made plan with patient to bring pain medication when requested by patient. Patient encouraged to report increased bleeding or clots.   rooming in and is supportive and helpful.         Problem: Postpartum (Vaginal Delivery) (Adult,Obstetrics,Pediatric)  Goal: Signs and Symptoms of Listed Potential Problems Will be Absent, Minimized or Managed (Postpartum)  Signs and symptoms of listed potential problems will be absent, minimized or managed by discharge/transition of care (reference Postpartum (Vaginal Delivery) (Adult,Obstetrics,Pediatric) CPG).   Outcome: Improving  Patient stated she would ask for pain medication if she needed it.  She did not want to be awakened for it.  Patient is using tucks ice and spray to perineum.

## 2018-10-14 VITALS
SYSTOLIC BLOOD PRESSURE: 116 MMHG | TEMPERATURE: 97.5 F | HEART RATE: 56 BPM | OXYGEN SATURATION: 100 % | DIASTOLIC BLOOD PRESSURE: 72 MMHG | RESPIRATION RATE: 16 BRPM

## 2018-10-14 PROCEDURE — 25000132 ZZH RX MED GY IP 250 OP 250 PS 637: Performed by: OBSTETRICS & GYNECOLOGY

## 2018-10-14 RX ADMIN — IBUPROFEN 800 MG: 800 TABLET ORAL at 00:51

## 2018-10-14 RX ADMIN — SENNOSIDES AND DOCUSATE SODIUM 1 TABLET: 8.6; 5 TABLET ORAL at 08:00

## 2018-10-14 RX ADMIN — IBUPROFEN 800 MG: 800 TABLET ORAL at 08:00

## 2018-10-14 NOTE — DISCHARGE INSTRUCTIONS

## 2018-10-14 NOTE — PROGRESS NOTES
[facility] Obstetrics Post-Partum Progress Note          Assessment and Plan:    Assessment:   Post-partum day #2  Normal spontaneous vaginal delivery  L&D complications: none      Doing well.  No excessive bleeding  Pain well-controlled.      Plan:   Discharge home           Interval History:   Doing well.  Pain is well-controlled.  No fevers.  No history of foul-smelling vaginal discharge.  Good appetite.  Denies chest pain, shortness of breath, nausea or vomiting.  Vaginal bleeding is similar to a heavy menstrual flow.  Ambulatory.  Breastfeeding well.          Significant Problems:      Patient Active Problem List   Diagnosis     CARDIOVASCULAR SCREENING; LDL GOAL LESS THAN 160     Prenatal care, first pregnancy     Supervision of normal first pregnancy     Pregnancy      (normal spontaneous vaginal delivery)             Review of Systems:    The patient denies any chest pain, shortness of breath, excessive pain, fever, chills, purulent drainage from the wound, nausea or vomiting.          Medications:   All medications related to the patient's surgery have been reviewed          Physical Exam:   All vitals stable  Uterine fundus is firm, non-tender and at the level of the umbilicus          Data:   All laboratory data related to this surgery reviewed  All imaging studies related to this surgery reviewed    Yojana Mc MD

## 2018-10-14 NOTE — DISCHARGE SUMMARY
[facility] Discharge Summary    Venessa Guillen MRN# [id number]   Age: 33 year old YOB: 1985     Date of Admission:  10/11/2018  Date of Discharge::  10/14/2018  Admitting Physician:  Yojana Mc MD  Discharge Physician:  Yojana Mc MD     Home clinic: [facility]          Admission Diagnoses:   Supervision of normal first pregnancy  Normal labor          Discharge Diagnosis:   Normal spontaneous vaginal delivery  Intrauterine pregnancy at 39 weeks gestation          Procedures:   Procedure(s): No additional procedures performed       No other procedures performed during this admission           Medications Prior to Admission:     Prescriptions Prior to Admission   Medication Sig Dispense Refill Last Dose     docusate sodium (COLACE) 100 MG tablet Take 100 mg by mouth daily 60 tablet 1 Past Week at Unknown time     hydrOXYzine (VISTARIL) 50 MG capsule Take 1 capsule (50 mg) by mouth 3 times daily as needed for other (for sleep) 6 capsule 0 10/10/2018 at Unknown time     Prenatal MV & Min w/FA-DHA (PRENATAL ADULT GUMMY/DHA/FA PO) Take 2 chew tab by mouth daily    10/11/2018 at Unknown time     Misc. Devices (BREAST PUMP) MISC 1 each as needed (Patient not taking: Reported on 10/3/2018) 1 each 0 future     [DISCONTINUED] EPINEPHrine (EPIPEN/ADRENACLICK/OR ANY BX GENERIC EQUIV) 0.3 MG/0.3ML injection 2-pack Inject 0.3 mg into the muscle as needed for anaphylaxis    Unknown at Unknown time             Discharge Medications:     Current Discharge Medication List      START taking these medications    Details   acetaminophen (TYLENOL) 500 MG tablet Take 2 tablets (1,000 mg) by mouth every 6 hours as needed for mild pain  Qty: 100 tablet, Refills: 0    Associated Diagnoses:  (normal spontaneous vaginal delivery)      ibuprofen (ADVIL/MOTRIN) 600 MG tablet Take 1 tablet (600 mg) by mouth every 6 hours as needed for moderate pain  Qty: 90 tablet, Refills: 1     "Associated Diagnoses:  (normal spontaneous vaginal delivery)      senna-docusate (SENOKOT-S;PERICOLACE) 8.6-50 MG per tablet Take 1 tablet by mouth 2 times daily  Qty: 60 tablet, Refills: 1    Associated Diagnoses:  (normal spontaneous vaginal delivery)         CONTINUE these medications which have NOT CHANGED    Details   docusate sodium (COLACE) 100 MG tablet Take 100 mg by mouth daily  Qty: 60 tablet, Refills: 1    Associated Diagnoses: Constipation, unspecified constipation type; Encounter for supervision of normal first pregnancy in third trimester      hydrOXYzine (VISTARIL) 50 MG capsule Take 1 capsule (50 mg) by mouth 3 times daily as needed for other (for sleep)  Qty: 6 capsule, Refills: 0    Associated Diagnoses: Encounter for supervision of normal first pregnancy in third trimester      Prenatal MV & Min w/FA-DHA (PRENATAL ADULT GUMMY/DHA/FA PO) Take 2 chew tab by mouth daily       Misc. Devices (BREAST PUMP) MISC 1 each as needed  Qty: 1 each, Refills: 0    Associated Diagnoses: Encounter for prenatal care in third trimester of first pregnancy         STOP taking these medications       EPINEPHrine (EPIPEN/ADRENACLICK/OR ANY BX GENERIC EQUIV) 0.3 MG/0.3ML injection 2-pack Comments:   Reason for Stopping:                     Consultations:   No consultations were requested during this admission          Brief History of Labor:       32 y/o  admitted in spontaneous labor with SROM; pitocin was utilized to augment labor when contractions slowed down; epidural for labor analgesia  She then had normal stage 1 progress and  liveborn male over 2nd degree perineal laceration, repaired with 3-0 Monocryl suture  Placenta spontaneous, intact  EBL 50cc  \"Pacheco\"  Yojana Mc MD  [facility] Course:   The patient's hospital course was unremarkable.  On discharge, her pain was well controlled. Vaginal bleeding is similar to peak menstrual flow.  Voiding without " difficulty.  Ambulating well and tolerating a normal diet.  No fever.  Breastfeeding well.  Infant is stable.  No bowel movement yet.*  She was discharged on post-partum day #2.    Post-partum hemoglobin:   Hemoglobin   Date Value Ref Range Status   10/11/2018 13.2 11.7 - 15.7 g/dL Final             Discharge Instructions and Follow-Up:   Discharge diet: Regular   Discharge activity: Pelvic rest: abstain from intercourse and do not use tampons for 6 week(s)   Discharge follow-up: Follow up with OB in 6 weeks   Wound care: Drink plenty of fluids           Discharge Disposition:   Discharged to home      Attestation:  I have reviewed today's vital signs, notes, medications, labs and imaging.    Yojana Mc MD

## 2018-10-14 NOTE — PLAN OF CARE
Problem: Patient Care Overview  Goal: Plan of Care/Patient Progress Review  Outcome: Completed Date Met: 10/14/18  Pt has followed her postpartum pathway with VSS, assessments WNL, is caring for her  and completing self cares independently. Pt reports mild pain that is well controlled with Ibuprofen, tuck pads and tub soaks. Father of the baby is very involved with  cares.

## 2018-10-14 NOTE — PLAN OF CARE
Problem: Postpartum (Vaginal Delivery) (Adult,Obstetrics,Pediatric)  Goal: Signs and Symptoms of Listed Potential Problems Will be Absent, Minimized or Managed (Postpartum)  Signs and symptoms of listed potential problems will be absent, minimized or managed by discharge/transition of care (reference Postpartum (Vaginal Delivery) (Adult,Obstetrics,Pediatric) CPG).   Outcome: Improving  Pt follows PP pathway without incident, tolerates po meds for pain which are working well for her.  She is up & about independently caring for herself & her . She is breastfeeding, that is going well; infant cluster feeding tonight.  FOB present & supportive, bonding well; infant rooming in with parents tonight.  Will continue to monitor & update as needed.

## 2018-10-14 NOTE — PROGRESS NOTES
Discharge instructions printed out and gone over with patient and significant other. Patient verbalized understanding and had all questions answered. Information given to patient on post partum education.  Discharge Medications gone over with patient are OTC for pt to  at the retail pharmacy for  on D/C.  Signature obtained. Pt escorted out with nursing staff and all belongings in hand to vehicle at 11:30.

## 2018-10-17 ENCOUNTER — TELEPHONE (OUTPATIENT)
Dept: OBGYN | Facility: CLINIC | Age: 33
End: 2018-10-17

## 2018-10-17 NOTE — TELEPHONE ENCOUNTER
Reason for Call:  Form, our goal is to have forms completed with 72 hours, however, some forms may require a visit or additional information.    Type of letter, form or note:  disability    Who is the form from?: Insurance comp    Where did the form come from: form was faxed in    What clinic location was the form placed at?: [facility]    Where the form was placed: Given to physician    What number is listed as a contact on the form?: [phone]         Additional comments: na      Call taken on 10/17/2018 at 11:36 AM by Rae Donaldson

## 2018-10-23 NOTE — TELEPHONE ENCOUNTER
Forms were signed and faxed then sent to be scanned and copy at .    Rae Donaldson  Clinic [facility]

## 2018-11-21 ENCOUNTER — PRENATAL OFFICE VISIT (OUTPATIENT)
Dept: OBGYN | Facility: CLINIC | Age: 33
End: 2018-11-21
Payer: COMMERCIAL

## 2018-11-21 VITALS
RESPIRATION RATE: 16 BRPM | SYSTOLIC BLOOD PRESSURE: 124 MMHG | WEIGHT: 135 LBS | HEIGHT: 65 IN | TEMPERATURE: 98.4 F | BODY MASS INDEX: 22.49 KG/M2 | DIASTOLIC BLOOD PRESSURE: 74 MMHG | HEART RATE: 81 BPM

## 2018-11-21 PROBLEM — Z34.00 SUPERVISION OF NORMAL FIRST PREGNANCY: Status: RESOLVED | Noted: 2018-10-10 | Resolved: 2018-11-21

## 2018-11-21 PROBLEM — Z34.90 PREGNANCY: Status: RESOLVED | Noted: 2018-10-11 | Resolved: 2018-11-21

## 2018-11-21 PROBLEM — Z34.00 PRENATAL CARE, FIRST PREGNANCY: Status: RESOLVED | Noted: 2018-02-20 | Resolved: 2018-11-21

## 2018-11-21 PROCEDURE — 99207 ZZC POST PARTUM EXAM: CPT | Performed by: OBSTETRICS & GYNECOLOGY

## 2018-11-21 ASSESSMENT — PATIENT HEALTH QUESTIONNAIRE - PHQ9
5. POOR APPETITE OR OVEREATING: NOT AT ALL
SUM OF ALL RESPONSES TO PHQ QUESTIONS 1-9: 0

## 2018-11-21 ASSESSMENT — ANXIETY QUESTIONNAIRES
GAD7 TOTAL SCORE: 0
2. NOT BEING ABLE TO STOP OR CONTROL WORRYING: NOT AT ALL
5. BEING SO RESTLESS THAT IT IS HARD TO SIT STILL: NOT AT ALL
3. WORRYING TOO MUCH ABOUT DIFFERENT THINGS: NOT AT ALL
6. BECOMING EASILY ANNOYED OR IRRITABLE: NOT AT ALL
1. FEELING NERVOUS, ANXIOUS, OR ON EDGE: NOT AT ALL
7. FEELING AFRAID AS IF SOMETHING AWFUL MIGHT HAPPEN: NOT AT ALL

## 2018-11-21 NOTE — MR AVS SNAPSHOT
"              After Visit Summary   11/21/2018    Venessa Guillen    MRN: [id number]           Patient Information     Date Of Birth          1985        Visit Information        Provider Department      11/21/2018 1:30 PM Yojana Mc MD [facility]        Today's Diagnoses     Routine postpartum follow-up    -  1       Follow-ups after your visit        Who to contact     If you have questions or need follow up information about today's clinic visit or your schedule please contact [facility] directly at [phone].  Normal or non-critical lab and imaging results will be communicated to you by [employer], letter or phone within 4 business days after the clinic has received the results. If you do not hear from us within 7 days, please contact the clinic through [employer] or phone. If you have a critical or abnormal lab result, we will notify you by phone as soon as possible.  Submit refill requests through [employer] or call your pharmacy and they will forward the refill request to us. Please allow 3 business days for your refill to be completed.          Additional Information About Your Visit        MyChart Information     [employer] gives you secure access to your electronic health record. If you see a primary care provider, you can also send messages to your care team and make appointments. If you have questions, please call your primary care clinic.  If you do not have a primary care provider, please call [phone] and they will assist you.        Care EveryWhere ID     This is your Care EveryWhere ID. This could be used by other organizations to access your [facility] medical records  [id number]        Your Vitals Were     Pulse Temperature Respirations Height Last Period Breastfeeding?    81 98.4  F (36.9  C) (Tympanic) 16 5' 5\" (1.651 m) 01/09/2018 Yes    BMI (Body Mass Index)                   22.47 kg/m2            Blood Pressure from Last 3 Encounters:   11/21/18 124/74 "   10/14/18 116/72   10/11/18 140/86    Weight from Last 3 Encounters:   18 135 lb (61.2 kg)   10/09/18 160 lb 9.6 oz (72.8 kg)   10/03/18 159 lb (72.1 kg)              Today, you had the following     No orders found for display       Primary Care Provider Fax #    Physician No Ref-Primary [phone]       No address on file        Equal Access to Services     ANDRÉS HARDIN : Hadii aad ku hadasho Soomaali, waaxda luqadaha, qaybta kaalmada adeegyada, waxay lylain haycecyn tammi bauerwilliejose eagle . So [facility] [phone].    ATENCIÓN: Si habla español, tiene a noland disposición servicios gratuitos de asistencia lingüística. Llame al [phone].    We comply with applicable federal civil rights laws and [place] laws. We do not discriminate on the basis of race, color, national origin, age, disability, sex, sexual orientation, or gender identity.            Thank you!     Thank you for choosing [facility]  for your care. Our goal is always to provide you with excellent care. Hearing back from our patients is one way we can continue to improve our services. Please take a few minutes to complete the written survey that you may receive in the mail after your visit with us. Thank you!             Your Updated Medication List - Protect others around you: Learn how to safely use, store and throw away your medicines at www.disposemymeds.org.          This list is accurate as of 18  2:17 PM.  Always use your most recent med list.                   Brand Name Dispense Instructions for use Diagnosis    acetaminophen 500 MG tablet    TYLENOL    100 tablet    Take 2 tablets (1,000 mg) by mouth every 6 hours as needed for mild pain     (normal spontaneous vaginal delivery)       breast pump Misc     1 each    1 each as needed    Encounter for prenatal care in third trimester of first pregnancy       docusate sodium 100 MG tablet    COLACE    60 tablet    Take 100 mg by mouth daily    Constipation,  unspecified constipation type, Encounter for supervision of normal first pregnancy in third trimester       hydrOXYzine 50 MG capsule    VISTARIL    6 capsule    Take 1 capsule (50 mg) by mouth 3 times daily as needed for other (for sleep)    Encounter for supervision of normal first pregnancy in third trimester       ibuprofen 600 MG tablet    ADVIL/MOTRIN    90 tablet    Take 1 tablet (600 mg) by mouth every 6 hours as needed for moderate pain     (normal spontaneous vaginal delivery)       PRENATAL ADULT GUMMY/DHA/FA PO      Take 2 chew tab by mouth daily        senna-docusate 8.6-50 MG per tablet    SENOKOT-S;PERICOLACE    60 tablet    Take 1 tablet by mouth 2 times daily     (normal spontaneous vaginal delivery)

## 2018-11-21 NOTE — PROGRESS NOTES
"Venessa is a 33 year old female 6 weeks S/P  of a liveborn baby boy.  The delivery was uncomplicated.  She is not still bleeding.  She is breastfeeding, and has selected condoms for birth control.  Her last PAP smear was , and was Normal; her  PHQ-9 inventory score is: 0    Exam: /74 (BP Location: Left arm, Patient Position: Chair, Cuff Size: Adult Regular)  Pulse 81  Temp 98.4  F (36.9  C) (Tympanic)  Resp 16  Ht 5' 5\" (1.651 m)  Wt 135 lb (61.2 kg)  LMP 2018  Breastfeeding? Yes  BMI 22.47 kg/m2  perineal laceration healed, cervix parous, uterus small, non-tender, no adnexal masses and normal lochia    Assessment:  Postpartum    Plan:  Recommend Kegel exercises daily  Advised on BC options  Yojana Mc MD  [facility]      "

## 2018-11-21 NOTE — NURSING NOTE
"Initial /74 (BP Location: Left arm, Patient Position: Chair, Cuff Size: Adult Regular)  Pulse 81  Temp 98.4  F (36.9  C) (Tympanic)  Resp 16  Ht 5' 5\" (1.651 m)  Wt 135 lb (61.2 kg)  LMP 01/09/2018  Breastfeeding? Yes  BMI 22.47 kg/m2 Estimated body mass index is 22.47 kg/(m^2) as calculated from the following:    Height as of this encounter: 5' 5\" (1.651 m).    Weight as of this encounter: 135 lb (61.2 kg). .      "

## 2018-11-22 ASSESSMENT — ANXIETY QUESTIONNAIRES: GAD7 TOTAL SCORE: 0

## 2019-02-05 ENCOUNTER — OFFICE VISIT (OUTPATIENT)
Dept: FAMILY MEDICINE | Facility: CLINIC | Age: 34
End: 2019-02-05
Payer: COMMERCIAL

## 2019-02-05 VITALS
SYSTOLIC BLOOD PRESSURE: 116 MMHG | DIASTOLIC BLOOD PRESSURE: 72 MMHG | BODY MASS INDEX: 22.06 KG/M2 | RESPIRATION RATE: 12 BRPM | TEMPERATURE: 97.7 F | OXYGEN SATURATION: 99 % | HEART RATE: 60 BPM | HEIGHT: 65 IN | WEIGHT: 132.4 LBS

## 2019-02-05 DIAGNOSIS — J01.90 ACUTE SINUSITIS WITH SYMPTOMS > 10 DAYS: Primary | ICD-10-CM

## 2019-02-05 PROCEDURE — 99213 OFFICE O/P EST LOW 20 MIN: CPT | Performed by: INTERNAL MEDICINE

## 2019-02-05 RX ORDER — AMOXICILLIN 875 MG
875 TABLET ORAL 2 TIMES DAILY
Qty: 14 TABLET | Refills: 0 | Status: SHIPPED | OUTPATIENT
Start: 2019-02-05 | End: 2019-02-12

## 2019-02-05 RX ORDER — FLUTICASONE PROPIONATE 50 MCG
1-2 SPRAY, SUSPENSION (ML) NASAL DAILY
Qty: 1 BOTTLE | Refills: 1 | Status: SHIPPED | OUTPATIENT
Start: 2019-02-05 | End: 2020-04-10

## 2019-02-05 ASSESSMENT — MIFFLIN-ST. JEOR: SCORE: 1306.44

## 2019-02-05 NOTE — PROGRESS NOTES
SUBJECTIVE:   Venessa Guillen is a 33 year old female who presents to clinic today for the following health issues:    Chief Complaint   Patient presents with     URI     x 3.5 weeks     ENT Symptoms             Symptoms: cc Present Absent Comment   Fever/Chills   x    Fatigue   x    Muscle Aches   x    Eye Irritation  x  Eye pressure    Sneezing  x     Nasal Miguel/Drg  x  Clear drainage, now changed to green    Sinus Pressure/Pain  x  Maxillary   Loss of smell   x    Dental pain   x    Sore Throat   x At the onset, has resolved    Swollen Glands  x  On/off swollen glands.    Ear Pain/Fullness  x  Sharp shooting pain in the right ear.    Cough x x  Some, better, dry   Wheeze   x    Chest Pain   x    Shortness of breath   x    Rash   x    Other x x  Constant headache since Sunday     Symptom duration:  3.5 weeks,    Symptom severity:  mild, sinus symptoms worsening   Treatments tried:  Tylenol, ibuprofen - not much help   Contacts:  son w/ RSV 1/11/19       Problem list and histories reviewed & adjusted, as indicated.  Additional history: as documented    Patient Active Problem List   Diagnosis     CARDIOVASCULAR SCREENING; LDL GOAL LESS THAN 160     Past Surgical History:   Procedure Laterality Date     BIOPSY OF SKIN LESION      mole removal     COLONOSCOPY      x2     MOUTH SURGERY      wisdom teeth       Social History     Tobacco Use     Smoking status: Never Smoker     Smokeless tobacco: Never Used   Substance Use Topics     Alcohol use: Yes     Comment: occas- quit with pregnancy     Family History   Problem Relation Age of Onset     Substance Abuse Father         recovered A&D     Hypertension Maternal Grandmother      Prostate Cancer Maternal Grandfather      Skin Cancer Maternal Grandfather         BCC     Diabetes Maternal Grandfather      Heart Surgery Maternal Grandfather         bypass     Lung Cancer Paternal Grandmother      Diabetes Paternal Grandmother      Heart Surgery Paternal Grandmother          "bypass     Prostate Cancer Paternal Grandfather          Current Outpatient Medications   Medication Sig Dispense Refill     amoxicillin (AMOXIL) 875 MG tablet Take 1 tablet (875 mg) by mouth 2 times daily for 7 days 14 tablet 0     fluticasone (FLONASE) 50 MCG/ACT nasal spray Spray 1-2 sprays into both nostrils daily 1 Bottle 1     Prenatal MV & Min w/FA-DHA (PRENATAL ADULT GUMMY/DHA/FA PO) Take 2 chew tab by mouth daily        acetaminophen (TYLENOL) 500 MG tablet Take 2 tablets (1,000 mg) by mouth every 6 hours as needed for mild pain 100 tablet 0     docusate sodium (COLACE) 100 MG tablet Take 100 mg by mouth daily 60 tablet 1     EPINEPHrine (EPIPEN/ADRENACLICK/OR ANY BX GENERIC EQUIV) 0.3 MG/0.3ML injection 2-pack Inject 0.3 mLs (0.3 mg) into the muscle as needed for anaphylaxis 0.3 mL 3     hydrOXYzine (VISTARIL) 50 MG capsule Take 1 capsule (50 mg) by mouth 3 times daily as needed for other (for sleep) (Patient not taking: Reported on 2/5/2019) 6 capsule 0     ibuprofen (ADVIL/MOTRIN) 600 MG tablet Take 1 tablet (600 mg) by mouth every 6 hours as needed for moderate pain 90 tablet 1     Misc. Devices (BREAST PUMP) MISC 1 each as needed (Patient not taking: Reported on 10/3/2018) 1 each 0     senna-docusate (SENOKOT-S;PERICOLACE) 8.6-50 MG per tablet Take 1 tablet by mouth 2 times daily 60 tablet 1     Allergies   Allergen Reactions     Shellfish Allergy Anaphylaxis and Hives     Ok with topical iodine     Ceclor [Cefaclor]        Reviewed and updated as needed this visit by clinical staff  Tobacco  Allergies  Meds  Med Hx  Surg Hx  Fam Hx  Soc Hx      Reviewed and updated as needed this visit by Provider         ROS:  Constitutional, HEENT, pulmonary systems are negative, except as otherwise noted.    OBJECTIVE:     /72 (BP Location: Right arm, Patient Position: Sitting, Cuff Size: Adult Regular)   Pulse 60   Temp 97.7  F (36.5  C) (Tympanic)   Resp 12   Ht 1.651 m (5' 5\")   Wt 60.1 kg (132 " lb 6.4 oz)   SpO2 99%   BMI 22.03 kg/m    Body mass index is 22.03 kg/m .    GENERAL: alert and no distress  EYES: Eyes grossly normal to inspection, PERRL and conjunctivae and sclerae normal  HENT: ear canals and TMs normal, R>L maxillary sinuses slightly tender to percussion, nose and mouth without ulcers or lesions, oropharynx normal  NECK: no adenopathy, no asymmetry, masses, or scars  RESP: lungs clear to auscultation - no rales, rhonchi or wheezes  CV: regular rate and rhythm, normal S1 S2, no S3 or S4, no murmur, click or rub       ASSESSMENT/PLAN:         1. Acute sinusitis with symptoms > 10 days    Venessa presents with 3+ weeks of URI and cough symptoms with sinus symptoms worsening recently, concerning for bacterial infection.  Will treat with antibiotics.  She is breastfeeding.  Had remove cephalosporin allergy but doesn't recall having problems with penicillins.  Will treat with amoxicillin and use Flonase to help promote drainage.  Follow-up as needed if not improving in a week or new/worsening symptoms develop.       - amoxicillin (AMOXIL) 875 MG tablet; Take 1 tablet (875 mg) by mouth 2 times daily for 7 days  Dispense: 14 tablet; Refill: 0  - fluticasone (FLONASE) 50 MCG/ACT nasal spray; Spray 1-2 sprays into both nostrils daily  Dispense: 1 Bottle; Refill: 1    Zak Dawkins MD  [facility]

## 2019-02-05 NOTE — PATIENT INSTRUCTIONS
Try some Flonase to help your sinuses drain.  When spraying into the nose, aim towards the ears for the best effect.

## 2019-02-13 ENCOUNTER — MYC REFILL (OUTPATIENT)
Dept: OBGYN | Facility: CLINIC | Age: 34
End: 2019-02-13

## 2019-02-13 DIAGNOSIS — Z91.013 H/O ALLERGY TO SHELLFISH: ICD-10-CM

## 2019-02-13 RX ORDER — EPINEPHRINE 0.3 MG/.3ML
0.3 INJECTION SUBCUTANEOUS PRN
Qty: 0.3 ML | Refills: 3 | Status: CANCELLED | OUTPATIENT
Start: 2019-02-13

## 2019-02-13 NOTE — TELEPHONE ENCOUNTER
Last office visit 11/21/18  Refills available.  Patient notified through [facility].    Rosalva Lin   [facility]  RN

## 2019-06-19 ENCOUNTER — OFFICE VISIT (OUTPATIENT)
Dept: DERMATOLOGY | Facility: CLINIC | Age: 34
End: 2019-06-19
Payer: COMMERCIAL

## 2019-06-19 VITALS — HEART RATE: 73 BPM | SYSTOLIC BLOOD PRESSURE: 109 MMHG | OXYGEN SATURATION: 99 % | DIASTOLIC BLOOD PRESSURE: 62 MMHG

## 2019-06-19 DIAGNOSIS — L81.4 LENTIGO: ICD-10-CM

## 2019-06-19 DIAGNOSIS — D48.5 NEOPLASM OF UNCERTAIN BEHAVIOR OF SKIN: Primary | ICD-10-CM

## 2019-06-19 DIAGNOSIS — D23.9 DERMATOFIBROMA: ICD-10-CM

## 2019-06-19 DIAGNOSIS — L82.1 SEBORRHEIC KERATOSIS: ICD-10-CM

## 2019-06-19 DIAGNOSIS — Z87.898 HISTORY OF ATYPICAL NEVUS: ICD-10-CM

## 2019-06-19 DIAGNOSIS — D18.01 CHERRY ANGIOMA: ICD-10-CM

## 2019-06-19 DIAGNOSIS — D22.9 MULTIPLE BENIGN NEVI: ICD-10-CM

## 2019-06-19 PROCEDURE — 11102 TANGNTL BX SKIN SINGLE LES: CPT | Performed by: PHYSICIAN ASSISTANT

## 2019-06-19 PROCEDURE — 99213 OFFICE O/P EST LOW 20 MIN: CPT | Mod: 25 | Performed by: PHYSICIAN ASSISTANT

## 2019-06-19 PROCEDURE — 88305 TISSUE EXAM BY PATHOLOGIST: CPT | Mod: TC | Performed by: PHYSICIAN ASSISTANT

## 2019-06-19 NOTE — PROGRESS NOTES
Venessa Guillen is a 33 year old year old female patient here today for ski check. No concerns today. She reports she has noticed more red spots but nothing else new. Patient has no other skin complaints today.  Remainder of the HPI, Meds, PMH, Allergies, FH, and SH was reviewed in chart.    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[employer]  Past Medical History:   Diagnosis Date     Chickenpox        Past Surgical History:   Procedure Laterality Date     BIOPSY OF SKIN LESION      mole removal     COLONOSCOPY      x2     MOUTH SURGERY      wisdom teeth        Family History   Problem Relation Age of Onset     Substance Abuse Father         recovered A&D     Hypertension Maternal Grandmother      Prostate Cancer Maternal Grandfather      Skin Cancer Maternal Grandfather         BCC     Diabetes Maternal Grandfather      Heart Surgery Maternal Grandfather         bypass     Lung Cancer Paternal Grandmother      Diabetes Paternal Grandmother      Heart Surgery Paternal Grandmother         bypass     Prostate Cancer Paternal Grandfather        Social History     Socioeconomic History     Marital status:      Spouse name: Not on file     Number of children: Not on file     Years of education: Not on file     Highest education level: Not on file   Occupational History     Not on file   Social Needs     Financial resource strain: Not on file     Food insecurity:     Worry: Not on file     Inability: Not on file     Transportation needs:     Medical: Not on file     Non-medical: Not on file   Tobacco Use     Smoking status: Never Smoker     Smokeless tobacco: Never Used   Substance and Sexual Activity     Alcohol use: Yes     Comment: occas- quit with pregnancy     Drug use: No     Sexual activity: Yes     Partners: Male     Comment:  since 2016   Lifestyle     Physical activity:      Days per week: Not on file     Minutes per session: Not on file     Stress: Not on file   Relationships     Social connections:     Talks on phone: Not on file     Gets together: Not on file     Attends Mandaeism service: Not on file     Active member of club or organization: Not on file     Attends meetings of clubs or organizations: Not on file     Relationship status: Not on file     Intimate partner violence:     Fear of current or ex partner: Not on file     Emotionally abused: Not on file     Physically abused: Not on file     Forced sexual activity: Not on file   Other Topics Concern     Not on file   Social History Narrative     Not on file       Outpatient Encounter Medications as of 2019   Medication Sig Dispense Refill     acetaminophen (TYLENOL) 500 MG tablet Take 2 tablets (1,000 mg) by mouth every 6 hours as needed for mild pain 100 tablet 0     docusate sodium (COLACE) 100 MG tablet Take 100 mg by mouth daily 60 tablet 1     EPINEPHrine (EPIPEN/ADRENACLICK/OR ANY BX GENERIC EQUIV) 0.3 MG/0.3ML injection 2-pack Inject 0.3 mLs (0.3 mg) into the muscle as needed for anaphylaxis 0.3 mL 3     fluticasone (FLONASE) 50 MCG/ACT nasal spray Spray 1-2 sprays into both nostrils daily 1 Bottle 1     ibuprofen (ADVIL/MOTRIN) 600 MG tablet Take 1 tablet (600 mg) by mouth every 6 hours as needed for moderate pain 90 tablet 1     Prenatal MV & Min w/FA-DHA (PRENATAL ADULT GUMMY/DHA/FA PO) Take 2 chew tab by mouth daily        senna-docusate (SENOKOT-S;PERICOLACE) 8.6-50 MG per tablet Take 1 tablet by mouth 2 times daily 60 tablet 1     [] amoxicillin (AMOXIL) 875 MG tablet Take 1 tablet (875 mg) by mouth 2 times daily for 7 days 14 tablet 0     hydrOXYzine (VISTARIL) 50 MG capsule Take 1 capsule (50 mg) by mouth 3 times daily as needed for other (for sleep) (Patient not taking: Reported on 2019) 6 capsule 0     Misc. Devices (BREAST PUMP) MISC 1 each as needed (Patient not taking: Reported on  10/3/2018) 1 each 0     No facility-administered encounter medications on file as of 6/19/2019.              Review Of Systems  Skin: As above  Eyes: negative  Ears/Nose/Throat: negative  Respiratory: No shortness of breath, dyspnea on exertion, cough, or hemoptysis  Cardiovascular: negative  Gastrointestinal: negative  Genitourinary: negative  Musculoskeletal: negative  Neurologic: negative  Psychiatric: negative  Hematologic/Lymphatic/Immunologic: negative  Endocrine: negative      O:   NAD, WDWN, Alert & Oriented, Mood & Affect wnl, Vitals stable   Here today alone   /62   Pulse 73   SpO2 99%    General appearance normal   Vitals stable   Alert, oriented and in no acute distress     0.7 cm brown irregular macule on left upper chest   Stuck on papules and brown macules on trunk and ext   Red papules on trunk  Brown papules and macules with regular pigment network and borders on torso and extremities  Pink firm papule with positive dimple sign on left leg   The remainder of the detailed exam was unremarkable; the following areas were examined:  scalp/hair, conjunctiva/lids, face, neck, lips/teeth, oral mucosa/gingiva, chest, back, abdomen, buttocks, digits/nails, RUE, LUE, RLE, LLE.      Eyes: Conjunctivae/lids:Normal     ENT: Lips: normal    MSK:Normal    Cardiovascular: peripheral edema none    Pulm: Breathing Normal    Neuro/Psych: Orientation:Normal; Mood/Affect:Normal  A/P:  1. R/O atypical nevus on left upper chest  TANGENTIAL BIOPSY SENT OUT:  After consent, anesthesia with LEC and prep, tangential excision performed and specimen sent out for permanent section histology.  No complications and routine wound care. Patient told to call our office in 1-2 weeks for result.      2. History of atypical nevus on right mid back   No evidence of recurrence.   3, Seborrheic keratosis, lentigo, angioma, benign nevi, dermatofibroma   BENIGN LESIONS DISCUSSED WITH PATIENT:  I discussed the specifics of tumor,  prognosis, and genetics of benign lesions.  I explained that treatment of these lesions would be purely cosmetic and not medically neccessary.  I discussed with patient different removal options including excision, cautery and /or laser.      Nature and genetics of benign skin lesions dicussed with patient.  Signs and Symptoms of skin cancer discussed with patient.  ABCDEs of melanoma reviewed with patient.  Patient encouraged to perform monthly skin exams.  UV precautions reviewed with patient  Risks of non-melanoma skin cancer discussed with patient   Return to clinic in one year or sooner if needed.

## 2019-06-19 NOTE — PATIENT INSTRUCTIONS
Wound Care Instructions     FOR SUPERFICIAL WOUNDS     [facility] [phone]    [facility] [phone]  Left upper chest                       AFTER 24 HOURS YOU SHOULD REMOVE THE BANDAGE AND BEGIN DAILY DRESSING CHANGES AS FOLLOWS:     1) Remove Dressing.     2) Clean and dry the area with tap water using a Q-tip or sterile gauze pad.     3) Apply Vaseline, Aquaphor, Polysporin ointment or Bacitracin ointment over entire wound.  Do NOT use Neosporin ointment.     4) Cover the wound with a band-aid, or a sterile non-stick gauze pad and micropore paper tape      REPEAT THESE INSTRUCTIONS AT LEAST ONCE A DAY UNTIL THE WOUND HAS COMPLETELY HEALED.    It is an old wives tale that a wound heals better when it is exposed to air and allowed to dry out. The wound will heal faster with a better cosmetic result if it is kept moist with ointment and covered with a bandage.    **Do not let the wound dry out.**      Supplies Needed:      *Cotton tipped applicators (Q-tips)    *Polysporin Ointment or Bacitracin Ointment (NOT NEOSPORIN)    *Band-aids or non-stick gauze pads and micropore paper tape.      PATIENT INFORMATION:    During the healing process you will notice a number of changes. All wounds develop a small halo of redness surrounding the wound.  This means healing is occurring. Severe itching with extensive redness usually indicates sensitivity to the ointment or bandage tape used to dress the wound.  You should call our office if this develops.      Swelling  and/or discoloration around your surgical site is common, particularly when performed around the eye.    All wounds normally drain.  The larger the wound the more drainage there will be.  After 7-10 days, you will notice the wound beginning to shrink and new skin will begin to grow.  The wound is healed when you can see skin has formed over the entire area.  A healed wound has a healthy, shiny look to the surface and is red to  dark pink in color to normalize.  Wounds may take approximately 4-6 weeks to heal.  Larger wounds may take 6-8 weeks.  After the wound is healed you may discontinue dressing changes.    You may experience a sensation of tightness as your wound heals. This is normal and will gradually subside.    Your healed wound may be sensitive to temperature changes. This sensitivity improves with time, but if you re having a lot of discomfort, try to avoid temperature extremes.    Patients frequently experience itching after their wound appears to have healed because of the continue healing under the skin.  Plain Vaseline will help relieve the itching.        POSSIBLE COMPLICATIONS    BLEEDIN. Leave the bandage in place.  2. Use tightly rolled up gauze or a cloth to apply direct pressure over the bandage for 30  minutes.  3. Reapply pressure for an additional 30 minutes if necessary  4. Use additional gauze and tape to maintain pressure once the bleeding has stopped.

## 2019-06-19 NOTE — LETTER
6/19/2019         RE: Venessa Guillen  [address]  [place] [address]        Dear Colleague,    Thank you for referring your patient, Venessa Guillen, to the [facility]. Please see a copy of my visit note below.    Venessa Guillen is a 33 year old year old female patient here today for ski check. No concerns today. She reports she has noticed more red spots but nothing else new. Patient has no other skin complaints today.  Remainder of the HPI, Meds, PMH, Allergies, FH, and SH was reviewed in chart.    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[employer]  Past Medical History:   Diagnosis Date     Chickenpox        Past Surgical History:   Procedure Laterality Date     BIOPSY OF SKIN LESION      mole removal     COLONOSCOPY      x2     MOUTH SURGERY      wisdom teeth        Family History   Problem Relation Age of Onset     Substance Abuse Father         recovered A&D     Hypertension Maternal Grandmother      Prostate Cancer Maternal Grandfather      Skin Cancer Maternal Grandfather         BCC     Diabetes Maternal Grandfather      Heart Surgery Maternal Grandfather         bypass     Lung Cancer Paternal Grandmother      Diabetes Paternal Grandmother      Heart Surgery Paternal Grandmother         bypass     Prostate Cancer Paternal Grandfather        Social History     Socioeconomic History     Marital status:      Spouse name: Not on file     Number of children: Not on file     Years of education: Not on file     Highest education level: Not on file   Occupational History     Not on file   Social Needs     Financial resource strain: Not on file     Food insecurity:     Worry: Not on file     Inability: Not on file     Transportation needs:     Medical: Not on file     Non-medical: Not on file   Tobacco Use     Smoking status: Never Smoker     Smokeless tobacco:  Never Used   Substance and Sexual Activity     Alcohol use: Yes     Comment: occas- quit with pregnancy     Drug use: No     Sexual activity: Yes     Partners: Male     Comment:  since 2016   Lifestyle     Physical activity:     Days per week: Not on file     Minutes per session: Not on file     Stress: Not on file   Relationships     Social connections:     Talks on phone: Not on file     Gets together: Not on file     Attends Gnosticism service: Not on file     Active member of club or organization: Not on file     Attends meetings of clubs or organizations: Not on file     Relationship status: Not on file     Intimate partner violence:     Fear of current or ex partner: Not on file     Emotionally abused: Not on file     Physically abused: Not on file     Forced sexual activity: Not on file   Other Topics Concern     Not on file   Social History Narrative     Not on file       Outpatient Encounter Medications as of 2019   Medication Sig Dispense Refill     acetaminophen (TYLENOL) 500 MG tablet Take 2 tablets (1,000 mg) by mouth every 6 hours as needed for mild pain 100 tablet 0     docusate sodium (COLACE) 100 MG tablet Take 100 mg by mouth daily 60 tablet 1     EPINEPHrine (EPIPEN/ADRENACLICK/OR ANY BX GENERIC EQUIV) 0.3 MG/0.3ML injection 2-pack Inject 0.3 mLs (0.3 mg) into the muscle as needed for anaphylaxis 0.3 mL 3     fluticasone (FLONASE) 50 MCG/ACT nasal spray Spray 1-2 sprays into both nostrils daily 1 Bottle 1     ibuprofen (ADVIL/MOTRIN) 600 MG tablet Take 1 tablet (600 mg) by mouth every 6 hours as needed for moderate pain 90 tablet 1     Prenatal MV & Min w/FA-DHA (PRENATAL ADULT GUMMY/DHA/FA PO) Take 2 chew tab by mouth daily        senna-docusate (SENOKOT-S;PERICOLACE) 8.6-50 MG per tablet Take 1 tablet by mouth 2 times daily 60 tablet 1     [] amoxicillin (AMOXIL) 875 MG tablet Take 1 tablet (875 mg) by mouth 2 times daily for 7 days 14 tablet 0     hydrOXYzine (VISTARIL) 50 MG  capsule Take 1 capsule (50 mg) by mouth 3 times daily as needed for other (for sleep) (Patient not taking: Reported on 2/5/2019) 6 capsule 0     Misc. Devices (BREAST PUMP) MISC 1 each as needed (Patient not taking: Reported on 10/3/2018) 1 each 0     No facility-administered encounter medications on file as of 6/19/2019.              Review Of Systems  Skin: As above  Eyes: negative  Ears/Nose/Throat: negative  Respiratory: No shortness of breath, dyspnea on exertion, cough, or hemoptysis  Cardiovascular: negative  Gastrointestinal: negative  Genitourinary: negative  Musculoskeletal: negative  Neurologic: negative  Psychiatric: negative  Hematologic/Lymphatic/Immunologic: negative  Endocrine: negative      O:   NAD, WDWN, Alert & Oriented, Mood & Affect wnl, Vitals stable   Here today alone   /62   Pulse 73   SpO2 99%    General appearance normal   Vitals stable   Alert, oriented and in no acute distress     0.7 cm brown irregular macule on left upper chest   Stuck on papules and brown macules on trunk and ext   Red papules on trunk  Brown papules and macules with regular pigment network and borders on torso and extremities  Pink firm papule with positive dimple sign on left leg   The remainder of the detailed exam was unremarkable; the following areas were examined:  scalp/hair, conjunctiva/lids, face, neck, lips/teeth, oral mucosa/gingiva, chest, back, abdomen, buttocks, digits/nails, RUE, LUE, RLE, LLE.      Eyes: Conjunctivae/lids:Normal     ENT: Lips: normal    MSK:Normal    Cardiovascular: peripheral edema none    Pulm: Breathing Normal    Neuro/Psych: Orientation:Normal; Mood/Affect:Normal  A/P:  1. R/O atypical nevus on left upper chest  TANGENTIAL BIOPSY SENT OUT:  After consent, anesthesia with LEC and prep, tangential excision performed and specimen sent out for permanent section histology.  No complications and routine wound care. Patient told to call our office in 1-2 weeks for result.      2.  History of atypical nevus on right mid back   No evidence of recurrence.   3, Seborrheic keratosis, lentigo, angioma, benign nevi, dermatofibroma   BENIGN LESIONS DISCUSSED WITH PATIENT:  I discussed the specifics of tumor, prognosis, and genetics of benign lesions.  I explained that treatment of these lesions would be purely cosmetic and not medically neccessary.  I discussed with patient different removal options including excision, cautery and /or laser.      Nature and genetics of benign skin lesions dicussed with patient.  Signs and Symptoms of skin cancer discussed with patient.  ABCDEs of melanoma reviewed with patient.  Patient encouraged to perform monthly skin exams.  UV precautions reviewed with patient  Risks of non-melanoma skin cancer discussed with patient   Return to clinic in one year or sooner if needed.     Again, thank you for allowing me to participate in the care of your patient.        Sincerely,        Karine Seay PA-C

## 2019-06-23 LAB — COPATH REPORT: NORMAL

## 2019-07-05 ENCOUNTER — OFFICE VISIT (OUTPATIENT)
Dept: FAMILY MEDICINE | Facility: CLINIC | Age: 34
End: 2019-07-05
Payer: COMMERCIAL

## 2019-07-05 VITALS
BODY MASS INDEX: 21.83 KG/M2 | RESPIRATION RATE: 12 BRPM | HEART RATE: 69 BPM | DIASTOLIC BLOOD PRESSURE: 64 MMHG | SYSTOLIC BLOOD PRESSURE: 107 MMHG | HEIGHT: 65 IN | TEMPERATURE: 96.1 F | WEIGHT: 131 LBS

## 2019-07-05 DIAGNOSIS — R07.0 THROAT PAIN: ICD-10-CM

## 2019-07-05 DIAGNOSIS — J06.9 VIRAL URI: Primary | ICD-10-CM

## 2019-07-05 LAB
DEPRECATED S PYO AG THROAT QL EIA: NORMAL
SPECIMEN SOURCE: NORMAL

## 2019-07-05 PROCEDURE — 99213 OFFICE O/P EST LOW 20 MIN: CPT | Performed by: FAMILY MEDICINE

## 2019-07-05 PROCEDURE — 87880 STREP A ASSAY W/OPTIC: CPT | Performed by: FAMILY MEDICINE

## 2019-07-05 PROCEDURE — 87081 CULTURE SCREEN ONLY: CPT | Performed by: FAMILY MEDICINE

## 2019-07-05 ASSESSMENT — PAIN SCALES - GENERAL: PAINLEVEL: MILD PAIN (3)

## 2019-07-05 ASSESSMENT — MIFFLIN-ST. JEOR: SCORE: 1300.09

## 2019-07-05 NOTE — NURSING NOTE
VISION   No corrective lenses  Tool used: Andrea   Both eyes: 10/6.3 (20/12.5)  Right eye:        10/8 (20/16)  Left eye:          10/8 (20/16)  Visual Acuity: Pass

## 2019-07-05 NOTE — PATIENT INSTRUCTIONS
Still looks like a viral illness at this point.  Your strep test was negative.  We'll let you know the final culture results when available.  I would recommend we try to get your sinuses to drain over the weekend with some over the counter treatment.    Please begin the flonase you have at home.  2 sprays in each nostril once daily.  I would also recommend beginning some sinus irrigation with something like the Netti Pot to encourage drainage.    If symptoms are not improved by Monday please send a message through [email or web address] and we can begin an antibiotic to treat a sinusitis.

## 2019-07-05 NOTE — PROGRESS NOTES
"Subjective     Venessa Guillen is a 33 year old female who presents to clinic today for the following health issues:    HPI   ENT Symptoms             Symptoms: cc Present Absent Comment   Fever/Chills   x    Fatigue  x     Muscle Aches  x  neck   Eye Irritation  x  Goop, red   Sneezing  x     Nasal Miguel/Drg  x     Sinus Pressure/Pain  x     Loss of smell   x    Dental pain  x     Sore Throat  x     Swollen Glands  x     Ear Pain/Fullness  x     Cough   x    Wheeze   x    Chest Pain   x    Shortness of breath   x    Rash   x    Other   x      Symptom duration:  1 1/2 weeks   Symptom severity:  moderate   Treatments tried:  tylenol - intermittently.   Contacts:  son has pink and ear infection     Most troubled by sore throat and some neck discomfort/soreness.  Eye symptoms began this AM.      Runny nose has been clear until the last couple of days when it has been thicker.  Had mild L sided sinus pressure/dental pain yesterday.    Reviewed and updated as needed this visit by Provider  Tobacco  Meds  Med Hx  Surg Hx  Fam Hx  Soc Hx        Review of Systems   ROS COMP: Constitutional, HEENT, cardiovascular, pulmonary, gi and gu systems are negative, except as otherwise noted.      Objective    /64 (BP Location: Right arm, Patient Position: Chair, Cuff Size: Adult Regular)   Pulse 69   Temp 96.1  F (35.6  C) (Tympanic)   Resp 12   Ht 1.651 m (5' 5\")   Wt 59.4 kg (131 lb)   LMP  (LMP Unknown)   Breastfeeding? Yes   BMI 21.80 kg/m    Body mass index is 21.8 kg/m .  Physical Exam   PE:  VS as above   Gen:  WN/WD/WH female in NAD   HEENT:  NC/AT, conjunctiva very mildly injected ld, no drainage, TM's wnl ld pearly gray  with good light reflex, oropharynx clear without exudate/erythema, nasal mucosa erythematous with clear drainage noted, mild l maxillary sinus tenderness to palpation.   Neck:  supple, no LAD appreciated   Heart:  RRR without murmur, nl S1, S2, no rubs or gallops   Lungs CTA ld without " rales/ronchi/wheezes        Diagnostic Test Results:  Strep screen - Negative        A/p:      ICD-10-CM    1. Viral URI J06.9    2. Throat pain R07.0 Strep, Rapid Screen     Beta strep group A culture     Reviewed viral nature of conjunctivitis, no indication for treatment.  Reviewed rewetting drops prn discomfort.  Pt does not wear contacts.    Strep negative today, culture pending.    Probable viral symptoms.  Has not tried any sinus drainage yet.  Advised regarding flonase/netti pot and consider antibiotic Monday for sinusitis if sinus pressure/congestion not improving     Patient Instructions   Still looks like a viral illness at this point.  Your strep test was negative.  We'll let you know the final culture results when available.  I would recommend we try to get your sinuses to drain over the weekend with some over the counter treatment.    Please begin the flonase you have at home.  2 sprays in each nostril once daily.  I would also recommend beginning some sinus irrigation with something like the Netti Pot to encourage drainage.    If symptoms are not improved by Monday please send a message through [employer] and we can begin an antibiotic to treat a sinusitis.

## 2019-07-06 LAB
BACTERIA SPEC CULT: NORMAL
SPECIMEN SOURCE: NORMAL

## 2019-08-22 NOTE — ANESTHESIA PREPROCEDURE EVALUATION
Anesthesia Evaluation       history and physical reviewed .      No history of anesthetic complications          ROS/MED HX    ENT/Pulmonary:  - neg pulmonary ROS     Neurologic:  - neg neurologic ROS     Cardiovascular:  - neg cardiovascular ROS       METS/Exercise Tolerance:     Hematologic:         Musculoskeletal:         GI/Hepatic:  - neg GI/hepatic ROS       Renal/Genitourinary:         Endo:         Psychiatric:         Infectious Disease:         Malignancy:         Other:                     Physical Exam  Normal systems: cardiovascular, pulmonary and dental    Airway   Mallampati: II  TM distance: > 3 FB  Neck ROM: full  Mouth opening: > 3 cm    Dental     Cardiovascular       Pulmonary           neg OB ROS                 Anesthesia Plan      History & Physical Review      ASA Status:  .  OB Epidural Asa: 2            Postoperative Care      Consents  Anesthetic plan, risks, benefits and alternatives discussed with:  Patient..                          .   0 = swallows foods/liquids without difficulty

## 2019-10-27 ENCOUNTER — OFFICE VISIT (OUTPATIENT)
Dept: URGENT CARE | Facility: URGENT CARE | Age: 34
End: 2019-10-27
Payer: COMMERCIAL

## 2019-10-27 VITALS
TEMPERATURE: 98.2 F | BODY MASS INDEX: 21.8 KG/M2 | OXYGEN SATURATION: 96 % | HEART RATE: 88 BPM | DIASTOLIC BLOOD PRESSURE: 74 MMHG | WEIGHT: 131 LBS | SYSTOLIC BLOOD PRESSURE: 111 MMHG

## 2019-10-27 DIAGNOSIS — J32.9 VIRAL SINUSITIS: ICD-10-CM

## 2019-10-27 DIAGNOSIS — B97.89 VIRAL SINUSITIS: ICD-10-CM

## 2019-10-27 DIAGNOSIS — J06.9 VIRAL UPPER RESPIRATORY TRACT INFECTION WITH COUGH: Primary | ICD-10-CM

## 2019-10-27 PROCEDURE — 99214 OFFICE O/P EST MOD 30 MIN: CPT | Performed by: PHYSICIAN ASSISTANT

## 2019-10-27 ASSESSMENT — ENCOUNTER SYMPTOMS
COUGH: 1
SINUS PRESSURE: 1
EYES NEGATIVE: 1
DIZZINESS: 0
JOINT SWELLING: 0
WEAKNESS: 0
RHINORRHEA: 0
NECK STIFFNESS: 0
BRUISES/BLEEDS EASILY: 0
NAUSEA: 0
CARDIOVASCULAR NEGATIVE: 1
HEADACHES: 0
VOMITING: 0
MYALGIAS: 0
BACK PAIN: 0
SHORTNESS OF BREATH: 0
ALLERGIC/IMMUNOLOGIC NEGATIVE: 1
WOUND: 0
HEMATOLOGIC/LYMPHATIC NEGATIVE: 1
CHILLS: 0
LIGHT-HEADEDNESS: 0
ENDOCRINE NEGATIVE: 1
FEVER: 0
PALPITATIONS: 0
DIARRHEA: 0
NECK PAIN: 0
MUSCULOSKELETAL NEGATIVE: 1
ARTHRALGIAS: 0
SORE THROAT: 0

## 2019-10-27 NOTE — PROGRESS NOTES
Chief Complaint:     Chief Complaint   Patient presents with     Sinus Problem     x 1 week, green nasal discharge, fever, sinus pressure in the face, dental pain, cough - She has been taking tylenol.        HPI: Venessa Guillen is an 34 year old female who presents with chest congestion, cough nonproductive, occasional, facial pain/pressure, fever and nasal congestion. Symptoms began 1  weeks ago and has unchanged.  There is no shortness of breath, wheezing and chest pain.      Recent travel?  no.      ROS:     Review of Systems   Constitutional: Negative for chills and fever.   HENT: Positive for congestion and sinus pressure. Negative for ear pain, rhinorrhea and sore throat.    Eyes: Negative.    Respiratory: Positive for cough. Negative for shortness of breath.    Cardiovascular: Negative.  Negative for chest pain and palpitations.   Gastrointestinal: Negative for diarrhea, nausea and vomiting.   Endocrine: Negative.    Genitourinary: Negative.    Musculoskeletal: Negative.  Negative for arthralgias, back pain, joint swelling, myalgias, neck pain and neck stiffness.   Skin: Negative.  Negative for rash and wound.   Allergic/Immunologic: Negative.  Negative for immunocompromised state.   Neurological: Negative for dizziness, weakness, light-headedness and headaches.   Hematological: Negative.  Does not bruise/bleed easily.        Respiratory History  occasional episodes of bronchitis       Family History   Family History   Problem Relation Age of Onset     Substance Abuse Father         recovered A&D     Hypertension Maternal Grandmother      Prostate Cancer Maternal Grandfather      Skin Cancer Maternal Grandfather         BCC     Diabetes Maternal Grandfather      Heart Surgery Maternal Grandfather         bypass     Lung Cancer Paternal Grandmother      Diabetes Paternal Grandmother      Heart Surgery Paternal Grandmother         bypass     Prostate Cancer Paternal Grandfather         Problem history  Patient  Active Problem List   Diagnosis     CARDIOVASCULAR SCREENING; LDL GOAL LESS THAN 160        Allergies  Allergies   Allergen Reactions     Shellfish Allergy Anaphylaxis and Hives     Ok with topical iodine     Ceclor [Cefaclor]         Social History  Social History     Socioeconomic History     Marital status:      Spouse name: Not on file     Number of children: Not on file     Years of education: Not on file     Highest education level: Not on file   Occupational History     Not on file   Social Needs     Financial resource strain: Not on file     Food insecurity:     Worry: Not on file     Inability: Not on file     Transportation needs:     Medical: Not on file     Non-medical: Not on file   Tobacco Use     Smoking status: Never Smoker     Smokeless tobacco: Never Used   Substance and Sexual Activity     Alcohol use: Yes     Comment: occas- quit with pregnancy     Drug use: No     Sexual activity: Yes     Partners: Male     Comment:  since 2016   Lifestyle     Physical activity:     Days per week: Not on file     Minutes per session: Not on file     Stress: Not on file   Relationships     Social connections:     Talks on phone: Not on file     Gets together: Not on file     Attends Baptism service: Not on file     Active member of club or organization: Not on file     Attends meetings of clubs or organizations: Not on file     Relationship status: Not on file     Intimate partner violence:     Fear of current or ex partner: Not on file     Emotionally abused: Not on file     Physically abused: Not on file     Forced sexual activity: Not on file   Other Topics Concern     Not on file   Social History Narrative     Not on file        Current Meds    Current Outpatient Medications:      Prenatal MV & Min w/FA-DHA (PRENATAL ADULT GUMMY/DHA/FA PO), Take 2 chew tab by mouth daily , Disp: , Rfl:      acetaminophen (TYLENOL) 500 MG tablet, Take 2 tablets (1,000 mg) by mouth every 6 hours as needed for  mild pain (Patient not taking: Reported on 7/5/2019), Disp: 100 tablet, Rfl: 0     docusate sodium (COLACE) 100 MG tablet, Take 100 mg by mouth daily (Patient not taking: Reported on 7/5/2019), Disp: 60 tablet, Rfl: 1     EPINEPHrine (EPIPEN/ADRENACLICK/OR ANY BX GENERIC EQUIV) 0.3 MG/0.3ML injection 2-pack, Inject 0.3 mLs (0.3 mg) into the muscle as needed for anaphylaxis (Patient not taking: Reported on 7/5/2019), Disp: 0.3 mL, Rfl: 3     fluticasone (FLONASE) 50 MCG/ACT nasal spray, Spray 1-2 sprays into both nostrils daily (Patient not taking: Reported on 7/5/2019), Disp: 1 Bottle, Rfl: 1     hydrOXYzine (VISTARIL) 50 MG capsule, Take 1 capsule (50 mg) by mouth 3 times daily as needed for other (for sleep) (Patient not taking: Reported on 2/5/2019), Disp: 6 capsule, Rfl: 0     ibuprofen (ADVIL/MOTRIN) 600 MG tablet, Take 1 tablet (600 mg) by mouth every 6 hours as needed for moderate pain (Patient not taking: Reported on 7/5/2019), Disp: 90 tablet, Rfl: 1     Misc. Devices (BREAST PUMP) MISC, 1 each as needed (Patient not taking: Reported on 10/27/2019), Disp: 1 each, Rfl: 0     senna-docusate (SENOKOT-S;PERICOLACE) 8.6-50 MG per tablet, Take 1 tablet by mouth 2 times daily (Patient not taking: Reported on 7/5/2019), Disp: 60 tablet, Rfl: 1        OBJECTIVE     Vital signs reviewed by Wilian Deutsch PA-C  /74   Pulse 88   Temp 98.2  F (36.8  C) (Tympanic)   Wt 59.4 kg (131 lb)   SpO2 96%   BMI 21.80 kg/m       Physical Exam  Vitals signs and nursing note reviewed.   Constitutional:       General: She is not in acute distress.     Appearance: She is well-developed. She is not ill-appearing, toxic-appearing or diaphoretic.   HENT:      Head: Normocephalic and atraumatic.      Right Ear: Hearing, tympanic membrane, ear canal and external ear normal. Tympanic membrane is not perforated, erythematous, retracted or bulging.      Left Ear: Hearing, tympanic membrane, ear canal and external ear normal.  Tympanic membrane is not perforated, erythematous, retracted or bulging.      Nose: Mucosal edema and congestion present. No rhinorrhea.      Right Sinus: Maxillary sinus tenderness present. No frontal sinus tenderness.      Left Sinus: Maxillary sinus tenderness present. No frontal sinus tenderness.      Mouth/Throat:      Pharynx: No pharyngeal swelling, oropharyngeal exudate, posterior oropharyngeal erythema or uvula swelling.      Tonsils: No tonsillar exudate or tonsillar abscesses. Swellin on the right. 0 on the left.   Eyes:      General:         Right eye: No discharge.         Left eye: No discharge.      Pupils: Pupils are equal, round, and reactive to light.   Neck:      Musculoskeletal: Normal range of motion and neck supple.   Cardiovascular:      Rate and Rhythm: Normal rate and regular rhythm.      Heart sounds: Normal heart sounds. No murmur. No friction rub. No gallop.    Pulmonary:      Effort: Pulmonary effort is normal. No respiratory distress.      Breath sounds: Normal breath sounds. No decreased breath sounds, wheezing, rhonchi or rales.   Chest:      Chest wall: No tenderness.   Abdominal:      General: Bowel sounds are normal. There is no distension.      Palpations: Abdomen is soft. There is no mass.      Tenderness: There is no tenderness. There is no guarding.   Lymphadenopathy:      Cervical: No cervical adenopathy.   Skin:     General: Skin is warm and dry.   Neurological:      Mental Status: She is alert and oriented to person, place, and time.      Cranial Nerves: No cranial nerve deficit.      Deep Tendon Reflexes: Reflexes are normal and symmetric.   Psychiatric:         Behavior: Behavior normal. Behavior is cooperative.         Thought Content: Thought content normal.         Judgement: Judgment normal.           Labs:     Results for orders placed or performed in visit on 19   Strep, Rapid Screen   Result Value Ref Range    Specimen Description Throat     Rapid Strep A  Screen       NEGATIVE: No Group A streptococcal antigen detected by immunoassay, await culture report.   Beta strep group A culture   Result Value Ref Range    Specimen Description Throat     Culture Micro No beta hemolytic Streptococcus Group A isolated        Medical Decision Making:    Differential Diagnosis:  URI Adult/Peds:  Bronchitis-viral, Pneumonia, Sinusitis, Viral syndrome and Viral upper respiratory illness        ASSESSMENT    1. Viral upper respiratory tract infection with cough    2. Viral sinusitis        PLAN    Patient presents with 1 week(s) chest congestion, cough nonproductive, occasional, facial pain/pressure, fever and nasal congestion.    Patient is in no acute distress.    Temp is 98.2 in clinic today, lung sounds were clear, and O2 sats at 96% on RA.  Imaging to rule out pneumonia is not indicated at this time.  Nasal saline rinses 2-3 times per day.  Rest, Push fluids, vaporizer, elevation of head of bed.  Tylenol for any fever or body aches.  Patient is currently trying to get pregnant.  She was instructed to stay away from ibuprofen, cough medicine, and decongestants.  If symptoms worsen, recheck immediately otherwise follow up with your PCP in 1 week if symptoms are not improving.  Worrisome symptoms discussed with instructions to go to the ED.  Patient verbalized understanding and agreed with this plan.         Wilian Deutsch PA-C  10/27/2019, 9:19 AM

## 2019-10-30 NOTE — PROGRESS NOTES
Having contractions on and off over the last 24 hours, now they are about 8-12 minutes apart.  +FM, + ctx, no VB or LOF.    33 year old  at 39w0d   - membranes stripped again today  - discussed s/s labor and when to call BC/come in  - discussed IOL after 41 weeks for post-dates or by maternal request with a favorable cervix after 39 weeks.  Discussed with Venessa Guillen, the following; indications; the agents and methods of labor augmentation, including risks, benefits, and alternative approaches; and the possible need for  birth. EFW is AGA.  The Labor Induction:what you need to know information sheet was made available to her. Questions and concerns were addressed and patient agrees to above if necessary during the course of her labor.      RTC 1 week    Aleena Delgado MD, MPH  [facility]      
PRINCIPAL DISCHARGE DIAGNOSIS  Diagnosis: Rectal adenocarcinoma  Assessment and Plan of Treatment:

## 2019-11-06 ENCOUNTER — HEALTH MAINTENANCE LETTER (OUTPATIENT)
Age: 34
End: 2019-11-06

## 2020-02-16 ENCOUNTER — HEALTH MAINTENANCE LETTER (OUTPATIENT)
Age: 35
End: 2020-02-16

## 2020-04-10 ENCOUNTER — PRENATAL OFFICE VISIT (OUTPATIENT)
Dept: OBGYN | Facility: CLINIC | Age: 35
End: 2020-04-10

## 2020-04-10 ENCOUNTER — APPOINTMENT (OUTPATIENT)
Dept: OBGYN | Facility: CLINIC | Age: 35
End: 2020-04-10
Payer: COMMERCIAL

## 2020-04-10 DIAGNOSIS — Z34.80 PRENATAL CARE, SUBSEQUENT PREGNANCY: ICD-10-CM

## 2020-04-10 PROCEDURE — 99207 ZZC NO CHARGE NURSE ONLY: CPT | Performed by: OBSTETRICS & GYNECOLOGY

## 2020-04-16 ENCOUNTER — PRENATAL OFFICE VISIT (OUTPATIENT)
Dept: OBGYN | Facility: CLINIC | Age: 35
End: 2020-04-16
Payer: COMMERCIAL

## 2020-04-16 VITALS
WEIGHT: 132.7 LBS | HEIGHT: 65 IN | DIASTOLIC BLOOD PRESSURE: 84 MMHG | SYSTOLIC BLOOD PRESSURE: 120 MMHG | BODY MASS INDEX: 22.11 KG/M2 | HEART RATE: 79 BPM | RESPIRATION RATE: 16 BRPM

## 2020-04-16 DIAGNOSIS — Z34.81 PRENATAL CARE, SUBSEQUENT PREGNANCY IN FIRST TRIMESTER: Primary | ICD-10-CM

## 2020-04-16 LAB
ABO + RH BLD: NORMAL
ABO + RH BLD: NORMAL
ALBUMIN UR-MCNC: NEGATIVE MG/DL
APPEARANCE UR: CLEAR
BILIRUB UR QL STRIP: NEGATIVE
BLD GP AB SCN SERPL QL: NORMAL
BLOOD BANK CMNT PATIENT-IMP: NORMAL
COLOR UR AUTO: YELLOW
ERYTHROCYTE [DISTWIDTH] IN BLOOD BY AUTOMATED COUNT: 12.5 % (ref 10–15)
GLUCOSE UR STRIP-MCNC: NEGATIVE MG/DL
HCT VFR BLD AUTO: 41.7 % (ref 35–47)
HGB BLD-MCNC: 13.6 G/DL (ref 11.7–15.7)
HGB UR QL STRIP: NEGATIVE
KETONES UR STRIP-MCNC: NEGATIVE MG/DL
LEUKOCYTE ESTERASE UR QL STRIP: NEGATIVE
MCH RBC QN AUTO: 28.9 PG (ref 26.5–33)
MCHC RBC AUTO-ENTMCNC: 32.6 G/DL (ref 31.5–36.5)
MCV RBC AUTO: 89 FL (ref 78–100)
NITRATE UR QL: NEGATIVE
PH UR STRIP: 6.5 PH (ref 5–7)
PLATELET # BLD AUTO: 248 10E9/L (ref 150–450)
RBC # BLD AUTO: 4.71 10E12/L (ref 3.8–5.2)
SOURCE: NORMAL
SP GR UR STRIP: <=1.005 (ref 1–1.03)
SPECIMEN EXP DATE BLD: NORMAL
UROBILINOGEN UR STRIP-ACNC: 0.2 EU/DL (ref 0.2–1)
WBC # BLD AUTO: 9.4 10E9/L (ref 4–11)

## 2020-04-16 PROCEDURE — 87389 HIV-1 AG W/HIV-1&-2 AB AG IA: CPT | Performed by: OBSTETRICS & GYNECOLOGY

## 2020-04-16 PROCEDURE — 86850 RBC ANTIBODY SCREEN: CPT | Performed by: OBSTETRICS & GYNECOLOGY

## 2020-04-16 PROCEDURE — 99207 ZZC FIRST OB VISIT: CPT | Performed by: OBSTETRICS & GYNECOLOGY

## 2020-04-16 PROCEDURE — 86900 BLOOD TYPING SEROLOGIC ABO: CPT | Performed by: OBSTETRICS & GYNECOLOGY

## 2020-04-16 PROCEDURE — 85027 COMPLETE CBC AUTOMATED: CPT | Performed by: OBSTETRICS & GYNECOLOGY

## 2020-04-16 PROCEDURE — 86762 RUBELLA ANTIBODY: CPT | Performed by: OBSTETRICS & GYNECOLOGY

## 2020-04-16 PROCEDURE — 87340 HEPATITIS B SURFACE AG IA: CPT | Performed by: OBSTETRICS & GYNECOLOGY

## 2020-04-16 PROCEDURE — 87591 N.GONORRHOEAE DNA AMP PROB: CPT | Performed by: OBSTETRICS & GYNECOLOGY

## 2020-04-16 PROCEDURE — 87491 CHLMYD TRACH DNA AMP PROBE: CPT | Performed by: OBSTETRICS & GYNECOLOGY

## 2020-04-16 PROCEDURE — 86780 TREPONEMA PALLIDUM: CPT | Performed by: OBSTETRICS & GYNECOLOGY

## 2020-04-16 PROCEDURE — 76817 TRANSVAGINAL US OBSTETRIC: CPT | Performed by: OBSTETRICS & GYNECOLOGY

## 2020-04-16 PROCEDURE — 86901 BLOOD TYPING SEROLOGIC RH(D): CPT | Performed by: OBSTETRICS & GYNECOLOGY

## 2020-04-16 PROCEDURE — 87086 URINE CULTURE/COLONY COUNT: CPT | Performed by: OBSTETRICS & GYNECOLOGY

## 2020-04-16 PROCEDURE — 36415 COLL VENOUS BLD VENIPUNCTURE: CPT | Performed by: OBSTETRICS & GYNECOLOGY

## 2020-04-16 PROCEDURE — 81003 URINALYSIS AUTO W/O SCOPE: CPT | Performed by: OBSTETRICS & GYNECOLOGY

## 2020-04-16 ASSESSMENT — MIFFLIN-ST. JEOR: SCORE: 1302.8

## 2020-04-16 NOTE — PROGRESS NOTES
"Venessa Guillen is a 34 year old   who presents to the clinic for an new ob visit.    Estimated Date of Delivery: 2020 is calculated from Patient's last menstrual period was 02/10/2020.   She has had bleeding since her LMP.  The bleeding  was bright red, in small, on 1 occasion, and was not accompanied by cramping...   She has had mild nausea. Weigh loss has not occurred.   This was a planned pregnancy.   FOB is involved,  Ramos   OTHER CONCERNS: none  INFECTION HISTORY  HIV: no  Hepatitis B: no  Hepatitis C: no  Syphilis:  no  Tuberculosis: no   PPD- no  Herpes self: no  Herpes partner:  no  Chlamydia:  no  Gonorrhea:  no  HPV: no  BV:  no  Trichomonis:  no  Chicken Pox:  YES  ====================================================  PERSONAL/SOCIAL HISTORY  Lives lives with their family.  Employment: Full time.  Her job involves light activity .  Additional items: None  =====================================================   REVIEW OF SYSTEMS  CONSTITUTIONAL: NEGATIVE for fever, chills  INTEGUMENTARY/SKIN: NEGATIVE for worrisome rashes, moles or lesions  EYES: NEGATIVE for vision changes   ENT/MOUTH: NEGATIVE for ear, mouth and throat problems  RESP: NEGATIVE for significant cough or SOB  BREAST: NEGATIVE for masses, tenderness or discharge  CV: NEGATIVE for chest pain, palpitations   GI: NEGATIVE for nausea, abdominal pain, heartburn, or change in bowel habits  : NEGATIVE for frequency, dysuria, or hematuria  MUSCULOSKELETAL: NEGATIVE for significant arthralgias or myalgia  NEURO: NEGATIVE for weakness, dizziness or paresthesias or headache  ENDOCRINE: NEGATIVE for temperature intolerance, skin/hair changes  HEME: NEGATIVE for bleeding problems  PSYCHIATRIC: NEGATIVE for changes in mood or affect  ====================================================  PHYSICAL EXAM:  /84 (BP Location: Left arm, Cuff Size: Adult Regular)   Pulse 79   Resp 16   Ht 1.651 m (5' 5\")   Wt 60.2 kg (132 lb 11.2 " oz)   LMP 02/10/2020   BMI 22.08 kg/m    BMI- Body mass index is 22.08 kg/m .,     RECOMMENDED WEIGHT GAIN: 15-25 lbs.  GENERAL:  Pleasant pregnant female, alert, well groomed.  SKIN:  Warm and dry, without lesions or rashes  HEAD: Symmetrical features.  EYES:  PERRLA,   MOUTH:  Buccal mucosa pink, moist without lesions.    NECK:  Thyroid without enlargement and nodules.  Lymph nodes not palpable.   LUNGS:  Clear to auscultation.  BREAST:  Symmetrical.  No dominant, fixed or suspicious masses are noted.  No skin or nipple changes or axillary nodes.    Nipples everted.      HEART:  RRR without murmur.  ABDOMEN: Soft without masses , tenderness or organomegaly.  No CVA tenderness. No scars noted..   MUSCULOSKELETAL:  Full range of motion  EXTREMITIES:  No edema. No significant varicosities.   GENITALIA:  BUS WNL, no lesions noted   VAGINA:  Pink, normal rugae and discharge normal and physiologic,   CERVIX:  smooth, without discharge or CMT and parous os,   firm/ closed 4 cm long.  UTERUS: Anteverted, nontender 9 weeks in size.  ADNEXA: Without masses or tenderness  PELVIS:  Arch; wide . Sacrum; deep. Spines;blunt.  Side walls; straight. Type; gynecoid  PELVIS:   Adequate, Pelvis proven to 8 pounds 5 ounces.  RECTAL:  Normal appearance.  Digital exam deferred.  WET PREP:Not done  gonorrhea  =========================================  ASSESSMENT:  (Z34.81) Prenatal care, subsequent pregnancy in first trimester  (primary encounter diagnosis)  Comment: Estimated Date of Delivery: Nov 16, 2020    Plan: ABO/Rh type and screen, CBC with platelets,         Hepatitis B surface antigen, Neisseria         gonorrhoeae PCR, Chlamydia trachomatis PCR, HIV        Antigen Antibody Combo, Rubella Antibody IgG         Quantitative, US OB Transvaginal Only,         Treponema Abs w Reflex to RPR and Titer, *UA         reflex to Microscopic, Urine Culture Aerobic         Bacterial            PREGNANCY AT RISK?  no  ==========================================  PLAN:  Instructed on use of triage nurse line and contacting the on call CNM after hours for an urgent need such as fever, vagina bleeding, bladder or vaginal infection, rupture of membranes,  or term labor.    Discussed the indications, uses for and false positives for quad screen, nuchal translucency and fetal survey ultrasound at 18-20 weeks gestation. Will inform us at the next visit if she wished to avail herself of these screens.  Instructed on best evidence for: weight gain for her BMI for pregnancy; healthy diet and foods to avoid; exercise and activity during pregnancy;avoiding exposure to toxoplasmosis; and maintenance of a generally healthy lifestyle.   Discussed the harms, benefits, side effects and alternative therapies for current prescribed and OTC medications.      Rodolfo Lundy MD

## 2020-04-17 LAB
BACTERIA SPEC CULT: NO GROWTH
C TRACH DNA SPEC QL NAA+PROBE: NEGATIVE
HBV SURFACE AG SERPL QL IA: NONREACTIVE
HIV 1+2 AB+HIV1 P24 AG SERPL QL IA: NONREACTIVE
Lab: NORMAL
N GONORRHOEA DNA SPEC QL NAA+PROBE: NEGATIVE
RUBV IGG SERPL IA-ACNC: 89 IU/ML
SPECIMEN SOURCE: NORMAL
T PALLIDUM AB SER QL: NONREACTIVE

## 2020-05-15 ENCOUNTER — VIRTUAL VISIT (OUTPATIENT)
Dept: OBGYN | Facility: CLINIC | Age: 35
End: 2020-05-15
Payer: COMMERCIAL

## 2020-05-15 DIAGNOSIS — Z34.81 PRENATAL CARE, SUBSEQUENT PREGNANCY IN FIRST TRIMESTER: Primary | ICD-10-CM

## 2020-05-15 PROCEDURE — 99207 ZZC PRENATAL VISIT: CPT | Performed by: OBSTETRICS & GYNECOLOGY

## 2020-05-15 NOTE — PROGRESS NOTES
"Venessa Guillen is a 34 year old female who is being evaluated via a billable telephone visit.      The patient has been notified of following:     \"This telephone visit will be conducted via a call between you and your physician/provider. We have found that certain health care needs can be provided without the need for a physical exam.  This service lets us provide the care you need with a short phone conversation.  If a prescription is necessary we can send it directly to your pharmacy.  If lab work is needed we can place an order for that and you can then stop by our lab to have the test done at a later time.    Telephone visits are billed at different rates depending on your insurance coverage. During this emergency period, for some insurers they may be billed the same as an in-person visit.  Please reach out to your insurance provider with any questions.    If during the course of the call the physician/provider feels a telephone visit is not appropriate, you will not be charged for this service.\"    Patient has given verbal consent for Telephone visit?  Yes    What phone number would you like to be contacted at? [phone]    How would you like to obtain your AVS? MyChart    Phone call duration: 5 minutes    Yojana Mc MD    CC: Here for routinephone prenatal visit @ 13w4d   HPI:  Feeling well; no complaints; declines NIPT    PE: LMP 02/10/2020    See OB flowsheet      A:  13 week pregnancy    Routine prenatal care  RTC 4 weeks.      Yojana Mc M.D.       "

## 2020-06-11 ENCOUNTER — PRENATAL OFFICE VISIT (OUTPATIENT)
Dept: OBGYN | Facility: CLINIC | Age: 35
End: 2020-06-11
Payer: COMMERCIAL

## 2020-06-11 VITALS
TEMPERATURE: 60.8 F | RESPIRATION RATE: 16 BRPM | HEIGHT: 65 IN | SYSTOLIC BLOOD PRESSURE: 106 MMHG | DIASTOLIC BLOOD PRESSURE: 69 MMHG | BODY MASS INDEX: 23.04 KG/M2 | HEART RATE: 77 BPM | WEIGHT: 138.3 LBS

## 2020-06-11 DIAGNOSIS — Z34.82 PRENATAL CARE, SUBSEQUENT PREGNANCY IN SECOND TRIMESTER: Primary | ICD-10-CM

## 2020-06-11 PROCEDURE — 99207 ZZC PRENATAL VISIT: CPT | Performed by: OBSTETRICS & GYNECOLOGY

## 2020-06-11 ASSESSMENT — MIFFLIN-ST. JEOR: SCORE: 1328.2

## 2020-06-11 NOTE — PROGRESS NOTES
"CC: Here for routine prenatal visit @ 17w3d   HPI:  Feeling well; no complaints; declines NIPT; reviewed anomaly screen sonogram and upcoming 1hr glucose test preparation    PE: /69 (BP Location: Right arm, Cuff Size: Adult Regular)   Pulse 77   Temp (!) 60.8  F (16  C)   Resp 16   Ht 1.651 m (5' 5\")   Wt 62.7 kg (138 lb 4.8 oz)   LMP 02/10/2020   BMI 23.01 kg/m     See OB flowsheet      A:  1. Prenatal care, subsequent pregnancy in second trimester    - US OB > 14 Weeks; Future      Routine prenatal care  RTC 4 weeks for virtual visit      Yojana Mc M.D.     "

## 2020-07-01 ENCOUNTER — HOSPITAL ENCOUNTER (OUTPATIENT)
Dept: ULTRASOUND IMAGING | Facility: CLINIC | Age: 35
Discharge: HOME | End: 2020-07-01
Attending: OBSTETRICS & GYNECOLOGY | Admitting: OBSTETRICS & GYNECOLOGY
Payer: COMMERCIAL

## 2020-07-01 DIAGNOSIS — Z34.82 PRENATAL CARE, SUBSEQUENT PREGNANCY IN SECOND TRIMESTER: ICD-10-CM

## 2020-07-01 PROCEDURE — 76805 OB US >/= 14 WKS SNGL FETUS: CPT

## 2020-07-10 ENCOUNTER — VIRTUAL VISIT (OUTPATIENT)
Dept: OBGYN | Facility: CLINIC | Age: 35
End: 2020-07-10
Payer: COMMERCIAL

## 2020-07-10 VITALS — WEIGHT: 141 LBS | BODY MASS INDEX: 23.46 KG/M2

## 2020-07-10 DIAGNOSIS — Z34.82 PRENATAL CARE, SUBSEQUENT PREGNANCY IN SECOND TRIMESTER: Primary | ICD-10-CM

## 2020-07-10 PROCEDURE — 99207 ZZC PRENATAL VISIT: CPT | Performed by: OBSTETRICS & GYNECOLOGY

## 2020-07-10 NOTE — PROGRESS NOTES
"Venessa Guillen is a 34 year old female who is being evaluated via a billable video visit.      The patient has been notified of following:     \"This video visit will be conducted via a call between you and your physician/provider. We have found that certain health care needs can be provided without the need for an in-person physical exam.  This service lets us provide the care you need with a video conversation.  If a prescription is necessary we can send it directly to your pharmacy.  If lab work is needed we can place an order for that and you can then stop by our lab to have the test done at a later time.    Video visits are billed at different rates depending on your insurance coverage.  Please reach out to your insurance provider with any questions.    If during the course of the call the physician/provider feels a video visit is not appropriate, you will not be charged for this service.\"    Patient has given verbal consent for Video visit? Yes  How would you like to obtain your AVS? MyChart  Patient would like the video invitation sent by: Text to cell phone: [phone]  Will anyone else be joining your video visit? No      Video-Visit Details    Type of service:  Video Visit    Video Start Time: 0933  Video End Time: 9:39 AM    Originating Location (pt. Location): Home    Distant Location (provider location):  [facility]     Platform used for Video Visit: Pushkart    Yojana Mc MD    CC: Here for routine virtual prenatal visit @ 21w4d   HPI:  Ultrasound reviewed with pt--WNL; did not discern gender by choice; feeling well; reports normal fetal movement; works mostly on phone in oncology clinic; discussed exclusive working from home starting at 37 weeks, use of PPE and social distancing if coming in contact with patients.  Discussed importance of hydration, sun protection    PE: Wt 64 kg (141 lb)   LMP 02/10/2020   BMI 23.46 kg/m     See OB flowsheet      A:  21 week " pregnancy    Routine prenatal care  RTC 4 weeks.      Yojana Mc M.D.

## 2020-08-06 ENCOUNTER — PRENATAL OFFICE VISIT (OUTPATIENT)
Dept: OBGYN | Facility: CLINIC | Age: 35
End: 2020-08-06
Payer: COMMERCIAL

## 2020-08-06 VITALS
BODY MASS INDEX: 24.32 KG/M2 | RESPIRATION RATE: 16 BRPM | DIASTOLIC BLOOD PRESSURE: 68 MMHG | TEMPERATURE: 98.4 F | HEART RATE: 79 BPM | HEIGHT: 65 IN | SYSTOLIC BLOOD PRESSURE: 109 MMHG | WEIGHT: 146 LBS

## 2020-08-06 DIAGNOSIS — Z34.82 PRENATAL CARE, SUBSEQUENT PREGNANCY IN SECOND TRIMESTER: Primary | ICD-10-CM

## 2020-08-06 LAB
ERYTHROCYTE [DISTWIDTH] IN BLOOD BY AUTOMATED COUNT: 13.2 % (ref 10–15)
GLUCOSE 1H P 50 G GLC PO SERPL-MCNC: 71 MG/DL (ref 60–129)
HCT VFR BLD AUTO: 38.9 % (ref 35–47)
HGB BLD-MCNC: 12.8 G/DL (ref 11.7–15.7)
MCH RBC QN AUTO: 30.8 PG (ref 26.5–33)
MCHC RBC AUTO-ENTMCNC: 32.9 G/DL (ref 31.5–36.5)
MCV RBC AUTO: 94 FL (ref 78–100)
PLATELET # BLD AUTO: 178 10E9/L (ref 150–450)
RBC # BLD AUTO: 4.16 10E12/L (ref 3.8–5.2)
T PALLIDUM AB SER QL: NONREACTIVE
WBC # BLD AUTO: 8.3 10E9/L (ref 4–11)

## 2020-08-06 PROCEDURE — 85027 COMPLETE CBC AUTOMATED: CPT | Performed by: OBSTETRICS & GYNECOLOGY

## 2020-08-06 PROCEDURE — 86780 TREPONEMA PALLIDUM: CPT | Performed by: OBSTETRICS & GYNECOLOGY

## 2020-08-06 PROCEDURE — 36415 COLL VENOUS BLD VENIPUNCTURE: CPT | Performed by: OBSTETRICS & GYNECOLOGY

## 2020-08-06 PROCEDURE — 99207 ZZC PRENATAL VISIT: CPT | Performed by: OBSTETRICS & GYNECOLOGY

## 2020-08-06 PROCEDURE — 82950 GLUCOSE TEST: CPT | Performed by: OBSTETRICS & GYNECOLOGY

## 2020-08-06 ASSESSMENT — MIFFLIN-ST. JEOR: SCORE: 1358.13

## 2020-08-06 NOTE — NURSING NOTE
"Initial /68 (BP Location: Left arm, Patient Position: Chair, Cuff Size: Adult Regular)   Pulse 79   Temp 98.4  F (36.9  C) (Tympanic)   Resp 16   Ht 1.651 m (5' 5\")   Wt 66.2 kg (146 lb)   LMP 02/10/2020   BMI 24.30 kg/m   Estimated body mass index is 24.3 kg/m  as calculated from the following:    Height as of this encounter: 1.651 m (5' 5\").    Weight as of this encounter: 66.2 kg (146 lb). .    Venessa Marshall, [facility]    "

## 2020-08-06 NOTE — PROGRESS NOTES
"CC: Here for routine prenatal visit @ 25w3d   HPI: + FM, no ctx, no LOF, no VB.  No complaints.     PE: /68 (BP Location: Left arm, Patient Position: Chair, Cuff Size: Adult Regular)   Pulse 79   Temp 98.4  F (36.9  C) (Tympanic)   Resp 16   Ht 1.651 m (5' 5\")   Wt 66.2 kg (146 lb)   LMP 02/10/2020   BMI 24.30 kg/m     See OB flowsheet    GCT in progress    A/P  @ 25w3d normal pregnancy    1. Routine prenatal care.  COVID restrictions and recommendations reviewed including iron supplementation.     RTC 4 weeks.      Aniya Malik M.D.    "

## 2020-09-03 ENCOUNTER — PRENATAL OFFICE VISIT (OUTPATIENT)
Dept: OBGYN | Facility: CLINIC | Age: 35
End: 2020-09-03
Payer: COMMERCIAL

## 2020-09-03 VITALS
DIASTOLIC BLOOD PRESSURE: 70 MMHG | HEART RATE: 72 BPM | WEIGHT: 151.1 LBS | TEMPERATURE: 97.9 F | RESPIRATION RATE: 16 BRPM | SYSTOLIC BLOOD PRESSURE: 116 MMHG | BODY MASS INDEX: 25.18 KG/M2 | HEIGHT: 65 IN

## 2020-09-03 DIAGNOSIS — Z23 NEED FOR PROPHYLACTIC VACCINATION AND INOCULATION AGAINST INFLUENZA: ICD-10-CM

## 2020-09-03 DIAGNOSIS — O26.843 UTERINE SIZE-DATE DISCREPANCY IN THIRD TRIMESTER: ICD-10-CM

## 2020-09-03 DIAGNOSIS — Z34.83 ENCOUNTER FOR SUPERVISION OF OTHER NORMAL PREGNANCY IN THIRD TRIMESTER: Primary | ICD-10-CM

## 2020-09-03 PROCEDURE — 90715 TDAP VACCINE 7 YRS/> IM: CPT | Performed by: OBSTETRICS & GYNECOLOGY

## 2020-09-03 PROCEDURE — 90472 IMMUNIZATION ADMIN EACH ADD: CPT | Performed by: OBSTETRICS & GYNECOLOGY

## 2020-09-03 PROCEDURE — 90471 IMMUNIZATION ADMIN: CPT | Performed by: OBSTETRICS & GYNECOLOGY

## 2020-09-03 PROCEDURE — 90686 IIV4 VACC NO PRSV 0.5 ML IM: CPT | Performed by: OBSTETRICS & GYNECOLOGY

## 2020-09-03 PROCEDURE — 99207 ZZC PRENATAL VISIT: CPT | Performed by: OBSTETRICS & GYNECOLOGY

## 2020-09-03 ASSESSMENT — MIFFLIN-ST. JEOR: SCORE: 1381.27

## 2020-09-03 NOTE — PROGRESS NOTES
Concerns:   Doing well.  No concerns today.  No vaginal bleeding, LOF.  No contractions.  Reportable signs and symptoms discussed.    Discussed kick counts and fetal movement.  Discussed PTL, PROM, and when to call or come in.  RTC 2 weeks.    Rodolfo Lundy MD

## 2020-09-08 ENCOUNTER — HOSPITAL ENCOUNTER (OUTPATIENT)
Dept: ULTRASOUND IMAGING | Facility: CLINIC | Age: 35
Discharge: HOME | End: 2020-09-08
Attending: OBSTETRICS & GYNECOLOGY | Admitting: OBSTETRICS & GYNECOLOGY
Payer: COMMERCIAL

## 2020-09-08 DIAGNOSIS — O26.843 UTERINE SIZE-DATE DISCREPANCY IN THIRD TRIMESTER: ICD-10-CM

## 2020-09-08 PROCEDURE — 76816 OB US FOLLOW-UP PER FETUS: CPT

## 2020-09-15 ENCOUNTER — PRENATAL OFFICE VISIT (OUTPATIENT)
Dept: OBGYN | Facility: CLINIC | Age: 35
End: 2020-09-15
Payer: COMMERCIAL

## 2020-09-15 VITALS
TEMPERATURE: 98.4 F | BODY MASS INDEX: 24.91 KG/M2 | SYSTOLIC BLOOD PRESSURE: 117 MMHG | HEIGHT: 66 IN | RESPIRATION RATE: 18 BRPM | HEART RATE: 69 BPM | WEIGHT: 155 LBS | DIASTOLIC BLOOD PRESSURE: 74 MMHG

## 2020-09-15 DIAGNOSIS — Z34.83 PRENATAL CARE, SUBSEQUENT PREGNANCY IN THIRD TRIMESTER: Primary | ICD-10-CM

## 2020-09-15 PROCEDURE — 99207 ZZC PRENATAL VISIT: CPT | Performed by: OBSTETRICS & GYNECOLOGY

## 2020-09-15 ASSESSMENT — MIFFLIN-ST. JEOR: SCORE: 1406.89

## 2020-09-15 NOTE — PROGRESS NOTES
"CC: Here for routine prenatal visit @ 31w1d   HPI: + FM, no ctx, no LOF, no VB.  No complaints.     PE: /74 (BP Location: Right arm, Patient Position: Chair, Cuff Size: Adult Regular)   Pulse 69   Temp 98.4  F (36.9  C) (Tympanic)   Resp 18   Ht 1.664 m (5' 5.5\")   Wt 70.3 kg (155 lb)   LMP 02/10/2020   BMI 25.40 kg/m     See OB flowsheet    EFW 61%ile    A/P  @ 31w1d normal pregnancy    1. Routine prenatal care    RTC 2 weeks.      Aniya Malik M.D.    "

## 2020-09-15 NOTE — NURSING NOTE
"Initial /74 (BP Location: Right arm, Patient Position: Chair, Cuff Size: Adult Regular)   Pulse 69   Temp 98.4  F (36.9  C) (Tympanic)   Resp 18   Ht 1.664 m (5' 5.5\")   Wt 70.3 kg (155 lb)   LMP 02/10/2020   BMI 25.40 kg/m   Estimated body mass index is 25.4 kg/m  as calculated from the following:    Height as of this encounter: 1.664 m (5' 5.5\").    Weight as of this encounter: 70.3 kg (155 lb). .      "

## 2020-10-02 ENCOUNTER — PRENATAL OFFICE VISIT (OUTPATIENT)
Dept: OBGYN | Facility: CLINIC | Age: 35
End: 2020-10-02
Payer: COMMERCIAL

## 2020-10-02 VITALS
WEIGHT: 156.4 LBS | TEMPERATURE: 98.1 F | SYSTOLIC BLOOD PRESSURE: 121 MMHG | BODY MASS INDEX: 25.13 KG/M2 | HEIGHT: 66 IN | RESPIRATION RATE: 16 BRPM | HEART RATE: 71 BPM | DIASTOLIC BLOOD PRESSURE: 69 MMHG

## 2020-10-02 DIAGNOSIS — Z34.83 PRENATAL CARE, SUBSEQUENT PREGNANCY IN THIRD TRIMESTER: Primary | ICD-10-CM

## 2020-10-02 PROCEDURE — 99207 PR PRENATAL VISIT: CPT | Performed by: OBSTETRICS & GYNECOLOGY

## 2020-10-02 ASSESSMENT — MIFFLIN-ST. JEOR: SCORE: 1413.24

## 2020-10-02 NOTE — NURSING NOTE
"Initial /69 (BP Location: Right arm, Patient Position: Chair, Cuff Size: Adult Regular)   Pulse 71   Temp 98.1  F (36.7  C) (Tympanic)   Resp 16   Ht 1.664 m (5' 5.5\")   Wt 70.9 kg (156 lb 6.4 oz)   LMP 02/10/2020   Breastfeeding No   BMI 25.63 kg/m   Estimated body mass index is 25.63 kg/m  as calculated from the following:    Height as of this encounter: 1.664 m (5' 5.5\").    Weight as of this encounter: 70.9 kg (156 lb 6.4 oz). .    Venessa Marshall CMA    "

## 2020-10-02 NOTE — PROGRESS NOTES
"CC: Here for routine prenatal visit @ 33w4d   HPI: + FM, no ctx, no LOF, no VB.  No complaints.     PE: /69 (BP Location: Right arm, Patient Position: Chair, Cuff Size: Adult Regular)   Pulse 71   Temp 98.1  F (36.7  C) (Tympanic)   Resp 16   Ht 1.664 m (5' 5.5\")   Wt 70.9 kg (156 lb 6.4 oz)   LMP 02/10/2020   Breastfeeding No   BMI 25.63 kg/m     See OB flowsheet    A/P  @ 33w4d normal pregnancy    1. Routine prenatal care.  COVID restrictions and recommendations reviewed including iron supplementation.     RTC 2 weeks.      Aniya Malik M.D.    "

## 2020-10-15 ENCOUNTER — PRENATAL OFFICE VISIT (OUTPATIENT)
Dept: OBGYN | Facility: CLINIC | Age: 35
End: 2020-10-15
Payer: COMMERCIAL

## 2020-10-15 VITALS
HEIGHT: 66 IN | BODY MASS INDEX: 25.39 KG/M2 | TEMPERATURE: 96.9 F | RESPIRATION RATE: 18 BRPM | HEART RATE: 73 BPM | SYSTOLIC BLOOD PRESSURE: 126 MMHG | WEIGHT: 158 LBS | DIASTOLIC BLOOD PRESSURE: 73 MMHG

## 2020-10-15 DIAGNOSIS — Z34.83 PRENATAL CARE, SUBSEQUENT PREGNANCY IN THIRD TRIMESTER: Primary | ICD-10-CM

## 2020-10-15 PROCEDURE — 99207 PR PRENATAL VISIT: CPT | Performed by: OBSTETRICS & GYNECOLOGY

## 2020-10-15 PROCEDURE — 87653 STREP B DNA AMP PROBE: CPT | Performed by: OBSTETRICS & GYNECOLOGY

## 2020-10-15 ASSESSMENT — MIFFLIN-ST. JEOR: SCORE: 1420.49

## 2020-10-15 NOTE — PROGRESS NOTES
"CC: Here for routine prenatal visit @ 35w3d   HPI:  Feeling well; denies contractions; labor precautions reviewed  COVID info pack given    PE: /73 (BP Location: Right arm, Patient Position: Chair, Cuff Size: Adult Regular)   Pulse 73   Temp 96.9  F (36.1  C) (Tympanic)   Resp 18   Ht 1.664 m (5' 5.5\")   Wt 71.7 kg (158 lb)   LMP 02/10/2020   BMI 25.89 kg/m     See OB flowsheet      A:  1. Prenatal care, subsequent pregnancy in third trimester    - Strep, Group B by PCR      Routine prenatal care  RTC 1 weeks.      Yojana Mc M.D.     "

## 2020-10-15 NOTE — NURSING NOTE
"Initial /73 (BP Location: Right arm, Patient Position: Chair, Cuff Size: Adult Regular)   Pulse 73   Temp 96.9  F (36.1  C) (Tympanic)   Resp 18   Ht 1.664 m (5' 5.5\")   Wt 71.7 kg (158 lb)   LMP 02/10/2020   BMI 25.89 kg/m   Estimated body mass index is 25.89 kg/m  as calculated from the following:    Height as of this encounter: 1.664 m (5' 5.5\").    Weight as of this encounter: 71.7 kg (158 lb). .      "

## 2020-10-16 LAB
GP B STREP DNA SPEC QL NAA+PROBE: NEGATIVE
SPECIMEN SOURCE: NORMAL

## 2020-10-22 ENCOUNTER — PRENATAL OFFICE VISIT (OUTPATIENT)
Dept: OBGYN | Facility: CLINIC | Age: 35
End: 2020-10-22
Payer: COMMERCIAL

## 2020-10-22 VITALS
DIASTOLIC BLOOD PRESSURE: 72 MMHG | RESPIRATION RATE: 18 BRPM | WEIGHT: 159 LBS | BODY MASS INDEX: 25.55 KG/M2 | SYSTOLIC BLOOD PRESSURE: 120 MMHG | HEIGHT: 66 IN | TEMPERATURE: 97.7 F | HEART RATE: 69 BPM

## 2020-10-22 DIAGNOSIS — Z34.83 PRENATAL CARE, SUBSEQUENT PREGNANCY IN THIRD TRIMESTER: Primary | ICD-10-CM

## 2020-10-22 PROCEDURE — 99207 PR PRENATAL VISIT: CPT | Performed by: OBSTETRICS & GYNECOLOGY

## 2020-10-22 ASSESSMENT — MIFFLIN-ST. JEOR: SCORE: 1425.03

## 2020-10-22 NOTE — NURSING NOTE
"Initial /72 (BP Location: Right arm, Patient Position: Chair, Cuff Size: Adult Regular)   Pulse 69   Temp 97.7  F (36.5  C) (Tympanic)   Resp 18   Ht 1.664 m (5' 5.5\")   Wt 72.1 kg (159 lb)   LMP 02/10/2020   BMI 26.06 kg/m   Estimated body mass index is 26.06 kg/m  as calculated from the following:    Height as of this encounter: 1.664 m (5' 5.5\").    Weight as of this encounter: 72.1 kg (159 lb). .      "

## 2020-10-22 NOTE — PROGRESS NOTES
"CC: Here for routine prenatal visit @ 36w3d   HPI:  GBS negative; some BHC; denies LOF    PE: /72 (BP Location: Right arm, Patient Position: Chair, Cuff Size: Adult Regular)   Pulse 69   Temp 97.7  F (36.5  C) (Tympanic)   Resp 18   Ht 1.664 m (5' 5.5\")   Wt 72.1 kg (159 lb)   LMP 02/10/2020   BMI 26.06 kg/m     See OB flowsheet      A:  1. Prenatal care, subsequent pregnancy in third trimester        Routine prenatal care  RTC 1 weeks.      Yojana Mc M.D.     "

## 2020-10-26 PROBLEM — O09.523 MULTIGRAVIDA OF ADVANCED MATERNAL AGE IN THIRD TRIMESTER: Status: ACTIVE | Noted: 2020-10-26

## 2020-10-27 ENCOUNTER — PRENATAL OFFICE VISIT (OUTPATIENT)
Dept: OBGYN | Facility: CLINIC | Age: 35
End: 2020-10-27
Payer: COMMERCIAL

## 2020-10-27 VITALS
HEIGHT: 66 IN | SYSTOLIC BLOOD PRESSURE: 134 MMHG | HEART RATE: 63 BPM | TEMPERATURE: 98 F | WEIGHT: 160.2 LBS | DIASTOLIC BLOOD PRESSURE: 80 MMHG | RESPIRATION RATE: 14 BRPM | BODY MASS INDEX: 25.75 KG/M2

## 2020-10-27 DIAGNOSIS — Z34.83 PRENATAL CARE, SUBSEQUENT PREGNANCY IN THIRD TRIMESTER: Primary | ICD-10-CM

## 2020-10-27 DIAGNOSIS — O09.523 MULTIGRAVIDA OF ADVANCED MATERNAL AGE IN THIRD TRIMESTER: ICD-10-CM

## 2020-10-27 PROCEDURE — 99207 PR PRENATAL VISIT: CPT | Performed by: OBSTETRICS & GYNECOLOGY

## 2020-10-27 ASSESSMENT — MIFFLIN-ST. JEOR: SCORE: 1430.47

## 2020-10-27 NOTE — NURSING NOTE
"Initial /80 (BP Location: Left arm, Patient Position: Sitting, Cuff Size: Adult Regular)   Pulse 63   Temp 98  F (36.7  C) (Tympanic)   Resp 14   Ht 1.664 m (5' 5.5\")   Wt 72.7 kg (160 lb 3.2 oz)   LMP 02/10/2020   BMI 26.25 kg/m   Estimated body mass index is 26.25 kg/m  as calculated from the following:    Height as of this encounter: 1.664 m (5' 5.5\").    Weight as of this encounter: 72.7 kg (160 lb 3.2 oz). .      "

## 2020-10-27 NOTE — PROGRESS NOTES
"[facility]    Return OB Note    CC: Return OB     Subjective:  Venessa is a 35 year old  at 37w1d   Denies vaginal bleeding, LOF, or regular contractions. Occasional maty boswell contractions. Good fetal movement.  Complaints today: None    Objective:  /80 (BP Location: Left arm, Patient Position: Sitting, Cuff Size: Adult Regular)   Pulse 63   Temp 98  F (36.7  C) (Tympanic)   Resp 14   Ht 1.664 m (5' 5.5\")   Wt 72.7 kg (160 lb 3.2 oz)   LMP 02/10/2020   BMI 26.25 kg/m      Fundal height: 35cm  FHT: 145bpm  SVE: /-3    Assessment/Plan:   Encounter Diagnoses   Name Primary?     Prenatal care, subsequent pregnancy in third trimester Yes     Multigravida of advanced maternal age in third trimester        IUP at 37w1d  GBS negative  Strict return precautions given    RTC 1 weeks    Noelle Dumont DO    "

## 2020-11-02 ENCOUNTER — PRENATAL OFFICE VISIT (OUTPATIENT)
Dept: OBGYN | Facility: CLINIC | Age: 35
End: 2020-11-02
Payer: COMMERCIAL

## 2020-11-02 VITALS
RESPIRATION RATE: 16 BRPM | BODY MASS INDEX: 25.81 KG/M2 | HEART RATE: 67 BPM | TEMPERATURE: 98.4 F | SYSTOLIC BLOOD PRESSURE: 117 MMHG | DIASTOLIC BLOOD PRESSURE: 76 MMHG | WEIGHT: 160.6 LBS | HEIGHT: 66 IN

## 2020-11-02 DIAGNOSIS — Z34.83 PRENATAL CARE, SUBSEQUENT PREGNANCY IN THIRD TRIMESTER: Primary | ICD-10-CM

## 2020-11-02 PROCEDURE — 99207 PR PRENATAL VISIT: CPT | Performed by: OBSTETRICS & GYNECOLOGY

## 2020-11-02 RX ORDER — POLYETHYLENE GLYCOL 3350 17 G/17G
1 POWDER, FOR SOLUTION ORAL DAILY
COMMUNITY
End: 2021-03-08

## 2020-11-02 ASSESSMENT — MIFFLIN-ST. JEOR: SCORE: 1432.29

## 2020-11-02 NOTE — NURSING NOTE
"Initial /76 (BP Location: Right arm, Patient Position: Chair, Cuff Size: Adult Regular)   Pulse 67   Temp 98.4  F (36.9  C) (Tympanic)   Resp 16   Ht 1.664 m (5' 5.5\")   Wt 72.8 kg (160 lb 9.6 oz)   LMP 02/10/2020   BMI 26.32 kg/m   Estimated body mass index is 26.32 kg/m  as calculated from the following:    Height as of this encounter: 1.664 m (5' 5.5\").    Weight as of this encounter: 72.8 kg (160 lb 9.6 oz). .      "

## 2020-11-02 NOTE — PROGRESS NOTES
"[facility]     Return OB Note     CC: Return OB      Subjective:  Venessa is a 35 year old  at 38w0d   Denies vaginal bleeding, LOF, or regular contractions. Occasional maty boswell contractions. Good fetal movement.  Complaints today: None     Objective:  /76 (BP Location: Right arm, Patient Position: Chair, Cuff Size: Adult Regular)   Pulse 67   Temp 98.4  F (36.9  C) (Tympanic)   Resp 16   Ht 1.664 m (5' 5.5\")   Wt 72.8 kg (160 lb 9.6 oz)   LMP 02/10/2020   BMI 26.32 kg/m        Fundal height: 36cm  FHT: 140 bpm  SVE: 3/50/-2     Assessment/Plan:   36 yo  at 38w0d  GBS negative  Labor precautions reviewed  Discussed pain management options, thinking NO  Breastfeeding, withdrawal for contraception   Would be eligible for an elective IOL at 39wks, discussed pitocin/AROM      RTC 1 weeks     Sylvie Matos MD      "

## 2020-11-05 ENCOUNTER — HOSPITAL ENCOUNTER (INPATIENT)
Facility: CLINIC | Age: 35
LOS: 1 days | Discharge: HOME | End: 2020-11-06
Attending: OBSTETRICS & GYNECOLOGY | Admitting: OBSTETRICS & GYNECOLOGY
Payer: COMMERCIAL

## 2020-11-05 ENCOUNTER — ANESTHESIA (OUTPATIENT)
Dept: OBGYN | Facility: CLINIC | Age: 35
End: 2020-11-05
Payer: COMMERCIAL

## 2020-11-05 ENCOUNTER — ANESTHESIA EVENT (OUTPATIENT)
Dept: OBGYN | Facility: CLINIC | Age: 35
End: 2020-11-05
Payer: COMMERCIAL

## 2020-11-05 PROBLEM — Z34.90 PREGNANCY: Status: RESOLVED | Noted: 2020-11-05 | Resolved: 2020-11-05

## 2020-11-05 PROBLEM — Z34.90 PREGNANCY: Status: ACTIVE | Noted: 2020-11-05

## 2020-11-05 LAB
ABO + RH BLD: NORMAL
ABO + RH BLD: NORMAL
BASOPHILS # BLD AUTO: 0 10E9/L (ref 0–0.2)
BASOPHILS NFR BLD AUTO: 0.3 %
DIFFERENTIAL METHOD BLD: ABNORMAL
EOSINOPHIL # BLD AUTO: 0 10E9/L (ref 0–0.7)
EOSINOPHIL NFR BLD AUTO: 0.3 %
ERYTHROCYTE [DISTWIDTH] IN BLOOD BY AUTOMATED COUNT: 13 % (ref 10–15)
HCT VFR BLD AUTO: 42.5 % (ref 35–47)
HGB BLD-MCNC: 14.4 G/DL (ref 11.7–15.7)
IMM GRANULOCYTES # BLD: 0.1 10E9/L (ref 0–0.4)
IMM GRANULOCYTES NFR BLD: 0.5 %
LABORATORY COMMENT REPORT: NORMAL
LYMPHOCYTES # BLD AUTO: 1.8 10E9/L (ref 0.8–5.3)
LYMPHOCYTES NFR BLD AUTO: 15.8 %
MCH RBC QN AUTO: 29.6 PG (ref 26.5–33)
MCHC RBC AUTO-ENTMCNC: 33.9 G/DL (ref 31.5–36.5)
MCV RBC AUTO: 87 FL (ref 78–100)
MONOCYTES # BLD AUTO: 0.7 10E9/L (ref 0–1.3)
MONOCYTES NFR BLD AUTO: 6.4 %
NEUTROPHILS # BLD AUTO: 8.8 10E9/L (ref 1.6–8.3)
NEUTROPHILS NFR BLD AUTO: 76.7 %
NRBC # BLD AUTO: 0 10*3/UL
NRBC BLD AUTO-RTO: 0 /100
PLATELET # BLD AUTO: 219 10E9/L (ref 150–450)
RBC # BLD AUTO: 4.86 10E12/L (ref 3.8–5.2)
SARS-COV-2 RNA SPEC QL NAA+PROBE: NEGATIVE
SARS-COV-2 RNA SPEC QL NAA+PROBE: NORMAL
SPECIMEN EXP DATE BLD: NORMAL
SPECIMEN SOURCE: NORMAL
SPECIMEN SOURCE: NORMAL
T PALLIDUM AB SER QL: NONREACTIVE
WBC # BLD AUTO: 11.5 10E9/L (ref 4–11)

## 2020-11-05 PROCEDURE — 722N000001 HC LABOR CARE VAGINAL DELIVERY SINGLE

## 2020-11-05 PROCEDURE — 250N000009 HC RX 250: Performed by: OBSTETRICS & GYNECOLOGY

## 2020-11-05 PROCEDURE — U0003 INFECTIOUS AGENT DETECTION BY NUCLEIC ACID (DNA OR RNA); SEVERE ACUTE RESPIRATORY SYNDROME CORONAVIRUS 2 (SARS-COV-2) (CORONAVIRUS DISEASE [COVID-19]), AMPLIFIED PROBE TECHNIQUE, MAKING USE OF HIGH THROUGHPUT TECHNOLOGIES AS DESCRIBED BY CMS-2020-01-R: HCPCS | Performed by: OBSTETRICS & GYNECOLOGY

## 2020-11-05 PROCEDURE — 86901 BLOOD TYPING SEROLOGIC RH(D): CPT | Performed by: OBSTETRICS & GYNECOLOGY

## 2020-11-05 PROCEDURE — 00HU33Z INSERTION OF INFUSION DEVICE INTO SPINAL CANAL, PERCUTANEOUS APPROACH: ICD-10-PCS | Performed by: OBSTETRICS & GYNECOLOGY

## 2020-11-05 PROCEDURE — 85025 COMPLETE CBC W/AUTO DIFF WBC: CPT | Performed by: OBSTETRICS & GYNECOLOGY

## 2020-11-05 PROCEDURE — 999N000011 HC STATISTIC ANESTHESIA CASE

## 2020-11-05 PROCEDURE — 250N000011 HC RX IP 250 OP 636: Performed by: NURSE ANESTHETIST, CERTIFIED REGISTERED

## 2020-11-05 PROCEDURE — 250N000009 HC RX 250: Performed by: NURSE ANESTHETIST, CERTIFIED REGISTERED

## 2020-11-05 PROCEDURE — 0UQMXZZ REPAIR VULVA, EXTERNAL APPROACH: ICD-10-PCS | Performed by: OBSTETRICS & GYNECOLOGY

## 2020-11-05 PROCEDURE — 250N000013 HC RX MED GY IP 250 OP 250 PS 637: Performed by: OBSTETRICS & GYNECOLOGY

## 2020-11-05 PROCEDURE — 3E0R3BZ INTRODUCTION OF ANESTHETIC AGENT INTO SPINAL CANAL, PERCUTANEOUS APPROACH: ICD-10-PCS | Performed by: OBSTETRICS & GYNECOLOGY

## 2020-11-05 PROCEDURE — 370N000003 HC ANESTHESIA WARD SERVICE

## 2020-11-05 PROCEDURE — 59400 OBSTETRICAL CARE: CPT | Performed by: OBSTETRICS & GYNECOLOGY

## 2020-11-05 PROCEDURE — 86900 BLOOD TYPING SEROLOGIC ABO: CPT | Performed by: OBSTETRICS & GYNECOLOGY

## 2020-11-05 PROCEDURE — 120N000001 HC R&B MED SURG/OB

## 2020-11-05 PROCEDURE — 36415 COLL VENOUS BLD VENIPUNCTURE: CPT | Performed by: OBSTETRICS & GYNECOLOGY

## 2020-11-05 PROCEDURE — 258N000003 HC RX IP 258 OP 636: Performed by: OBSTETRICS & GYNECOLOGY

## 2020-11-05 PROCEDURE — 86780 TREPONEMA PALLIDUM: CPT | Performed by: OBSTETRICS & GYNECOLOGY

## 2020-11-05 RX ORDER — EPHEDRINE SULFATE 50 MG/ML
INJECTION, SOLUTION INTRAMUSCULAR; INTRAVENOUS; SUBCUTANEOUS
Status: DISCONTINUED
Start: 2020-11-05 | End: 2020-11-05 | Stop reason: HOSPADM

## 2020-11-05 RX ORDER — EPHEDRINE SULFATE 50 MG/ML
5 INJECTION, SOLUTION INTRAMUSCULAR; INTRAVENOUS; SUBCUTANEOUS
Status: DISCONTINUED | OUTPATIENT
Start: 2020-11-05 | End: 2020-11-05

## 2020-11-05 RX ORDER — ACETAMINOPHEN 325 MG/1
650 TABLET ORAL EVERY 4 HOURS PRN
Status: DISCONTINUED | OUTPATIENT
Start: 2020-11-05 | End: 2020-11-05

## 2020-11-05 RX ORDER — MISOPROSTOL 200 UG/1
800 TABLET ORAL
Status: DISCONTINUED | OUTPATIENT
Start: 2020-11-05 | End: 2020-11-06 | Stop reason: HOSPADM

## 2020-11-05 RX ORDER — LIDOCAINE HYDROCHLORIDE 10 MG/ML
INJECTION, SOLUTION INFILTRATION; PERINEURAL PRN
Status: DISCONTINUED | OUTPATIENT
Start: 2020-11-05 | End: 2020-11-05

## 2020-11-05 RX ORDER — NALBUPHINE HYDROCHLORIDE 10 MG/ML
2.5-5 INJECTION, SOLUTION INTRAMUSCULAR; INTRAVENOUS; SUBCUTANEOUS EVERY 6 HOURS PRN
Status: DISCONTINUED | OUTPATIENT
Start: 2020-11-05 | End: 2020-11-05

## 2020-11-05 RX ORDER — LIDOCAINE 40 MG/G
CREAM TOPICAL
Status: DISCONTINUED | OUTPATIENT
Start: 2020-11-05 | End: 2020-11-05

## 2020-11-05 RX ORDER — BISACODYL 10 MG
10 SUPPOSITORY, RECTAL RECTAL DAILY PRN
Status: DISCONTINUED | OUTPATIENT
Start: 2020-11-07 | End: 2020-11-06 | Stop reason: HOSPADM

## 2020-11-05 RX ORDER — METHYLERGONOVINE MALEATE 0.2 MG/ML
200 INJECTION INTRAVENOUS
Status: DISCONTINUED | OUTPATIENT
Start: 2020-11-05 | End: 2020-11-05

## 2020-11-05 RX ORDER — MODIFIED LANOLIN
OINTMENT (GRAM) TOPICAL
Status: DISCONTINUED | OUTPATIENT
Start: 2020-11-05 | End: 2020-11-06 | Stop reason: HOSPADM

## 2020-11-05 RX ORDER — OXYCODONE AND ACETAMINOPHEN 5; 325 MG/1; MG/1
1 TABLET ORAL
Status: DISCONTINUED | OUTPATIENT
Start: 2020-11-05 | End: 2020-11-05

## 2020-11-05 RX ORDER — ONDANSETRON 2 MG/ML
4 INJECTION INTRAMUSCULAR; INTRAVENOUS EVERY 6 HOURS PRN
Status: DISCONTINUED | OUTPATIENT
Start: 2020-11-05 | End: 2020-11-05

## 2020-11-05 RX ORDER — EPINEPHRINE 1 MG/ML
INJECTION, SOLUTION, CONCENTRATE INTRAVENOUS PRN
Status: DISCONTINUED | OUTPATIENT
Start: 2020-11-05 | End: 2020-11-05

## 2020-11-05 RX ORDER — OXYTOCIN 10 [USP'U]/ML
10 INJECTION, SOLUTION INTRAMUSCULAR; INTRAVENOUS
Status: DISCONTINUED | OUTPATIENT
Start: 2020-11-05 | End: 2020-11-05

## 2020-11-05 RX ORDER — BUPIVACAINE HYDROCHLORIDE 7.5 MG/ML
INJECTION, SOLUTION INTRASPINAL PRN
Status: DISCONTINUED | OUTPATIENT
Start: 2020-11-05 | End: 2020-11-05

## 2020-11-05 RX ORDER — NALOXONE HYDROCHLORIDE 0.4 MG/ML
.1-.4 INJECTION, SOLUTION INTRAMUSCULAR; INTRAVENOUS; SUBCUTANEOUS
Status: DISCONTINUED | OUTPATIENT
Start: 2020-11-05 | End: 2020-11-05

## 2020-11-05 RX ORDER — HYDROCORTISONE 2.5 %
CREAM (GRAM) TOPICAL 3 TIMES DAILY PRN
Status: DISCONTINUED | OUTPATIENT
Start: 2020-11-05 | End: 2020-11-06 | Stop reason: HOSPADM

## 2020-11-05 RX ORDER — IBUPROFEN 800 MG/1
800 TABLET, FILM COATED ORAL
Status: DISCONTINUED | OUTPATIENT
Start: 2020-11-05 | End: 2020-11-05

## 2020-11-05 RX ORDER — OXYTOCIN 10 [USP'U]/ML
10 INJECTION, SOLUTION INTRAMUSCULAR; INTRAVENOUS
Status: DISCONTINUED | OUTPATIENT
Start: 2020-11-05 | End: 2020-11-06 | Stop reason: HOSPADM

## 2020-11-05 RX ORDER — TRANEXAMIC ACID 10 MG/ML
1 INJECTION, SOLUTION INTRAVENOUS EVERY 30 MIN PRN
Status: DISCONTINUED | OUTPATIENT
Start: 2020-11-05 | End: 2020-11-05

## 2020-11-05 RX ORDER — CARBOPROST TROMETHAMINE 250 UG/ML
250 INJECTION, SOLUTION INTRAMUSCULAR
Status: DISCONTINUED | OUTPATIENT
Start: 2020-11-05 | End: 2020-11-06 | Stop reason: HOSPADM

## 2020-11-05 RX ORDER — OXYTOCIN/0.9 % SODIUM CHLORIDE 30/500 ML
100-340 PLASTIC BAG, INJECTION (ML) INTRAVENOUS CONTINUOUS PRN
Status: COMPLETED | OUTPATIENT
Start: 2020-11-05 | End: 2020-11-05

## 2020-11-05 RX ORDER — AMOXICILLIN 250 MG
2 CAPSULE ORAL 2 TIMES DAILY
Status: DISCONTINUED | OUTPATIENT
Start: 2020-11-05 | End: 2020-11-06 | Stop reason: HOSPADM

## 2020-11-05 RX ORDER — OXYTOCIN/0.9 % SODIUM CHLORIDE 30/500 ML
340 PLASTIC BAG, INJECTION (ML) INTRAVENOUS CONTINUOUS PRN
Status: DISCONTINUED | OUTPATIENT
Start: 2020-11-05 | End: 2020-11-06 | Stop reason: HOSPADM

## 2020-11-05 RX ORDER — CARBOPROST TROMETHAMINE 250 UG/ML
250 INJECTION, SOLUTION INTRAMUSCULAR
Status: DISCONTINUED | OUTPATIENT
Start: 2020-11-05 | End: 2020-11-05

## 2020-11-05 RX ORDER — ACETAMINOPHEN 325 MG/1
650 TABLET ORAL EVERY 4 HOURS PRN
Status: DISCONTINUED | OUTPATIENT
Start: 2020-11-05 | End: 2020-11-06 | Stop reason: HOSPADM

## 2020-11-05 RX ORDER — IBUPROFEN 600 MG/1
600 TABLET, FILM COATED ORAL EVERY 6 HOURS PRN
Status: DISCONTINUED | OUTPATIENT
Start: 2020-11-05 | End: 2020-11-06 | Stop reason: HOSPADM

## 2020-11-05 RX ORDER — CITRIC ACID/SODIUM CITRATE 334-500MG
30 SOLUTION, ORAL ORAL ONCE
Status: DISCONTINUED | OUTPATIENT
Start: 2020-11-05 | End: 2020-11-05

## 2020-11-05 RX ORDER — METHYLERGONOVINE MALEATE 0.2 MG/ML
200 INJECTION INTRAVENOUS
Status: DISCONTINUED | OUTPATIENT
Start: 2020-11-05 | End: 2020-11-06 | Stop reason: HOSPADM

## 2020-11-05 RX ORDER — FENTANYL CITRATE 50 UG/ML
INJECTION, SOLUTION INTRAMUSCULAR; INTRAVENOUS PRN
Status: DISCONTINUED | OUTPATIENT
Start: 2020-11-05 | End: 2020-11-05

## 2020-11-05 RX ORDER — SODIUM CHLORIDE, SODIUM LACTATE, POTASSIUM CHLORIDE, CALCIUM CHLORIDE 600; 310; 30; 20 MG/100ML; MG/100ML; MG/100ML; MG/100ML
INJECTION, SOLUTION INTRAVENOUS CONTINUOUS
Status: DISCONTINUED | OUTPATIENT
Start: 2020-11-05 | End: 2020-11-05

## 2020-11-05 RX ORDER — AMOXICILLIN 250 MG
1 CAPSULE ORAL 2 TIMES DAILY
Status: DISCONTINUED | OUTPATIENT
Start: 2020-11-05 | End: 2020-11-06 | Stop reason: HOSPADM

## 2020-11-05 RX ORDER — TRANEXAMIC ACID 10 MG/ML
1 INJECTION, SOLUTION INTRAVENOUS EVERY 30 MIN PRN
Status: DISCONTINUED | OUTPATIENT
Start: 2020-11-05 | End: 2020-11-06 | Stop reason: HOSPADM

## 2020-11-05 RX ORDER — OXYTOCIN/0.9 % SODIUM CHLORIDE 30/500 ML
100 PLASTIC BAG, INJECTION (ML) INTRAVENOUS CONTINUOUS
Status: DISCONTINUED | OUTPATIENT
Start: 2020-11-05 | End: 2020-11-06 | Stop reason: HOSPADM

## 2020-11-05 RX ADMIN — IBUPROFEN 600 MG: 600 TABLET ORAL at 08:01

## 2020-11-05 RX ADMIN — Medication 340 ML/HR: at 07:00

## 2020-11-05 RX ADMIN — IBUPROFEN 600 MG: 600 TABLET ORAL at 20:38

## 2020-11-05 RX ADMIN — IBUPROFEN 600 MG: 600 TABLET ORAL at 14:01

## 2020-11-05 RX ADMIN — EPINEPHRINE 0.2 MG: 1 INJECTION, SOLUTION INTRAMUSCULAR; SUBCUTANEOUS at 06:31

## 2020-11-05 RX ADMIN — BUPIVACAINE HYDROCHLORIDE IN DEXTROSE 1.2 ML: 7.5 INJECTION, SOLUTION SUBARACHNOID at 06:31

## 2020-11-05 RX ADMIN — LIDOCAINE HYDROCHLORIDE 10 MG: 10 INJECTION, SOLUTION INFILTRATION; PERINEURAL at 06:29

## 2020-11-05 RX ADMIN — DOCUSATE SODIUM AND SENNOSIDES 1 TABLET: 8.6; 5 TABLET ORAL at 20:37

## 2020-11-05 RX ADMIN — SODIUM CHLORIDE, POTASSIUM CHLORIDE, SODIUM LACTATE AND CALCIUM CHLORIDE 1000 ML: 600; 310; 30; 20 INJECTION, SOLUTION INTRAVENOUS at 06:17

## 2020-11-05 RX ADMIN — FENTANYL CITRATE 50 MCG: 50 INJECTION, SOLUTION INTRAMUSCULAR; INTRAVENOUS at 06:31

## 2020-11-05 NOTE — PLAN OF CARE
Pt delivered at 7:02 vaginally.  Labial laceration.  Fundus firm and light flow.  This writer took over at 0715

## 2020-11-05 NOTE — ANESTHESIA CARE TRANSFER NOTE
Patient: Venessa Guillen    * No procedures listed *    Diagnosis: * No pre-op diagnosis entered *  Diagnosis Additional Information: No value filed.    Anesthesia Type:   No value filed.     Note:  Airway :Room Air  Patient transferred to:Phase II  Handoff Report: Identifed the Patient, Identified the Reponsible Provider, Reviewed the pertinent medical history, Discussed the surgical course, Reviewed Intra-OP anesthesia mangement and issues during anesthesia, Set expectations for post-procedure period and Allowed opportunity for questions and acknowledgement of understanding      Vitals: (Last set prior to Anesthesia Care Transfer)              Electronically Signed By: Goldy Erickson CRNA, APRN CRNA  November 5, 2020  6:41 AM

## 2020-11-05 NOTE — PLAN OF CARE
S:Patient presents due to  labor evaluation.  B:38w3d   Allergies: Shellfish allergy and Ceclor [cefaclor]  A:moderate variablility, + accels, no decels, Category I  normal uterine activity  {posterior, dilated to 6, soft, and effaced 80-90%  Admission on 2020   Component Date Value    WBC 2020 11.5*    RBC Count 2020 4.86     Hemoglobin 2020 14.4     Hematocrit 2020 42.5     MCV 2020 87     MCH 2020 29.6     MCHC 2020 33.9     RDW 2020 13.0     Platelet Count 2020 219     Diff Method 2020 Automated Method     % Neutrophils 2020 76.7     % Lymphocytes 2020 15.8     % Monocytes 2020 6.4     % Eosinophils 2020 0.3     % Basophils 2020 0.3     % Immature Granulocytes 2020 0.5     Nucleated RBCs 2020 0     Absolute Neutrophil 2020 8.8*    Absolute Lymphocytes 2020 1.8     Absolute Monocytes 2020 0.7     Absolute Eosinophils 2020 0.0     Absolute Basophils 2020 0.0     Abs Immature Granulocytes 2020 0.1     Absolute Nucleated RBC 2020 0.0      Dr. ERNESTO Dumont called, en route and orders received.  Plan includes; admit to labor. Reviewed with patient and she agrees with plan.   Bill of Rights given & questions discussed?: Yes  HC Your Rights handout : Informed and given    Prenatal Breastfeeding Education Toolkit provided for patient to review,helping her to make an informed decision on a feeding choice for her baby. Patient accepted. Questions answered.    Oriented to room and call light.

## 2020-11-05 NOTE — H&P
[facility]    History and Physical Note    Venessa Guillen  1985  [id number]    HPI: Venessa Guillen is a 35 year old  at 38w3d by LMP c/w 9w0d US, who presents with complaint of labor. Reports a gush of fluid at 03:00. Contractions have been increasing in intensity and frequency since then. Good FM. No vaginal bleeding.  She denies fever, HA, blurry vision, nausea, vomiting, CP, SOB, RUQ pain, constipation, diarrhea, and acute swelling.        Pregnancy notable for:  AMA    OBHX:   OB History    Para Term  AB Living   2 1 1 0 0 1   SAB TAB Ectopic Multiple Live Births   0 0 0 0 1      # Outcome Date GA Lbr Garry/2nd Weight Sex Delivery Anes PTL Lv   2 Current            1 Term 10/12/18 39w3d 17:10 / 01:17 3.771 kg (8 lb 5 oz) M Vag-Spont EPI N WINSTON      Name: Pacheco      Apgar1: 9  Apgar5: 9       MedicalHX:   Past Medical History:   Diagnosis Date     Chickenpox        SurgicalHX:   Past Surgical History:   Procedure Laterality Date     BIOPSY OF SKIN LESION      mole removal     COLONOSCOPY      x2     MOUTH SURGERY      wisdom teeth       Medications:   No current facility-administered medications on file prior to encounter.        acetaminophen (TYLENOL) 500 MG tablet, Take 2 tablets (1,000 mg) by mouth every 6 hours as needed for mild pain (Patient not taking: Reported on 2020)       docusate sodium (COLACE) 100 MG tablet, Take 100 mg by mouth daily       Prenatal MV & Min w/FA-DHA (PRENATAL ADULT GUMMY/DHA/FA PO), Take 2 chew tab by mouth daily         Allergies:  Allergies   Allergen Reactions     Shellfish Allergy Anaphylaxis and Hives     Ok with topical iodine     Ceclor [Cefaclor]        FamilyHX:  Family History   Problem Relation Age of Onset     Substance Abuse Father         recovered A&D     Hypertension Maternal Grandmother      Prostate Cancer Maternal Grandfather      Skin Cancer Maternal Grandfather         BCC     Diabetes Maternal  Grandfather      Heart Surgery Maternal Grandfather         bypass     Lung Cancer Paternal Grandmother      Diabetes Paternal Grandmother      Heart Surgery Paternal Grandmother         bypass     Prostate Cancer Paternal Grandfather        SocialHX:   Social History     Socioeconomic History     Marital status:      Spouse name: Not on file     Number of children: Not on file     Years of education: Not on file     Highest education level: Not on file   Occupational History     Not on file   Social Needs     Financial resource strain: Not on file     Food insecurity     Worry: Not on file     Inability: Not on file     Transportation needs     Medical: Not on file     Non-medical: Not on file   Tobacco Use     Smoking status: Never Smoker     Smokeless tobacco: Never Used   Substance and Sexual Activity     Alcohol use: Yes     Comment: occas- quit with pregnancy     Drug use: No     Sexual activity: Yes     Partners: Male     Comment:  since 2016   Lifestyle     Physical activity     Days per week: Not on file     Minutes per session: Not on file     Stress: Not on file   Relationships     Social connections     Talks on phone: Not on file     Gets together: Not on file     Attends Voodoo service: Not on file     Active member of club or organization: Not on file     Attends meetings of clubs or organizations: Not on file     Relationship status: Not on file     Intimate partner violence     Fear of current or ex partner: Not on file     Emotionally abused: Not on file     Physically abused: Not on file     Forced sexual activity: Not on file   Other Topics Concern     Not on file   Social History Narrative     Not on file       ROS: 10-point ROS negative except as in HPI     Physical Exam:  Vitals:    11/05/20 0608   BP: (!) 173/94   Resp: 18   Temp: 97.9  F (36.6  C)   TempSrc: Oral     Initial BP taken during contraction. Repeat 133/78.    GEN: resting comfortably in bed, NAD   CV: RRR, no  murmurs  PULM: No increased work of breathing, no cough/wheeze   ABD: soft, gravid, non-tender, non-distended  EXT: no edema, non-tender to palpation  SVE: 680/0  Presentation: cephalic by SVE  Membranes: SROM    NST:  FHT: baseline 130 bpm, moderate variability, + accels, no decels  TOCO: q2min    Labs:        Lab Results   Component Value Date    ABO A 2020    RH Pos 2020    AS Neg 2020    HEPBANG Nonreactive 2020    CHPCRT Negative 2020    GCPCRT Negative 2020    TREPAB Negative 2018    HGB 14.4 2020       GBS Status:   Lab Results   Component Value Date    GBS Negative 10/15/2020       Lab Results   Component Value Date    PAP NIL 2018       A/P: Venessa Guillen is a 35 year old female  at 38w3d, here for labor with SROM.    Admit to L&D. Place PIV. Admission labs.   Labor: Expectant management   FHT: Category 1 FHT.  Continue EFM and toco  Pain: Analgesia PRN  GBS:   negative    Noelle Dumont DO

## 2020-11-05 NOTE — ANESTHESIA PROCEDURE NOTES
Procedure note : intrathecal      Staff -   CRNA: Goldy Erickson APRN CRNA  Performed By: CRNA  Pre-Procedure    Location: OB    Procedure Times:11/5/2020 6:28 AM and 11/5/2020 6:33 AM  Pre-Anesthestic Checklist: patient identified, IV checked, site marked, risks and benefits discussed, informed consent, monitors and equipment checked, pre-op evaluation and at physician/surgeon's request    Timeout  Correct Patient: Yes   Correct Procedure: Yes   Correct Site: Yes   Correct Laterality: N/A   Correct Position: Yes   Site Marked: N/A   .   Procedure Documentation  ASA 2  .    Procedure: intrathecal, .   Patient Position:sitting Insertion Site:L4-5  (midline approach)     Patient Prep/Sterile Barriers; mask, sterile gloves, povidone-iodine 7.5% surgical scrub, patient draped.  .  Needle:  Spinal Needle (gauge): 25  Spinal/LP Needle Length (inches): 3.5 # of attempts: 1 and  # of redirects:  1 Introducer used .        Assessment/Narrative  Paresthesias: No.  .  .  clear CSF fluid removed . Time Injected: 06:32  Comments:  VAS pain score prior to injection:10/10    VAS pain score after injection:2/10    FHR stable, pt. Tolerated well.

## 2020-11-05 NOTE — L&D DELIVERY NOTE
"Delivery Summary    Venessa Guillen MRN# [id number]   Age: 35 year old YOB: 1985     ASSESSMENT & PLAN:       Venessa Guillen is a 35 year old  presented at 38 weeks 3 days by LMP who was admitted to L&D with labor and SROM. Her pregnancy was complicated by AMA. She was given an intrathecal injection for analgesia. She progressed spontaneously through labor until complete dilation. Patient did not labor down. She pushed effectively and delivered a viable female infant with Apgars 9/9 over an intact perineum. Weight is pending and skin-to-skin was performed. Delayed cord clamping was performed. Cord was clamped and cut. Placenta delivered via active management and was noted to be intact with a three vessel cord.   Left labial laceration was noted and repaired with 3-0 vicryl in standard fashion. QBL 250ml. Sponge and needle counts correct. Mom and baby were transported to postpartum in stable condition.   \"Mechelle\"         Wilmer, Female-Venessa [[id number]]    Labor Event Times    Labor onset date: 20 Onset time:  3:00 AM   Dilation complete date: 20 Complete time:  6:53 AM   Start pushing date/time: 2020 0658      Labor Length    1st Stage (hrs): 3 (min): 53   2nd Stage (hrs): 0 (min): 9   3rd Stage (hrs): 0 (min): 2      Labor Events     labor?: No   steroids: None  Labor Type: Spontaneous     Antibiotics received during labor?: No     Rupture date/time: 20 0300   Rupture type: Spontaneous rupture of membranes occuring during spontaneous labor or augmentation  Fluid color: Clear  Fluid odor: Normal     1:1 continuous labor support provided by?: RN Labor partogram used?: no      Delivery/Placenta Date and Time    Delivery Date: 20 Delivery Time:  7:02 AM   Placenta Date/Time: 2020  7:05 AM  Oxytocin given at the time of delivery: after delivery of baby     Vaginal Counts     Initial count performed by 2 team members:  Two Team Members   Julia" Sloane Dumont       Luna Suture Luna Sponges Instruments   Initial counts 2  5    Added to count  1     Final counts       Placed during labor Accounted for at the end of labor    Final count performed by 2 team members:  Two Team Members   Julia Dumont      Final count correct?: Yes     Apgars    Living status: Living   1 Minute 5 Minute 10 Minute 15 Minute 20 Minute   Skin color: 1  1       Heart rate: 2  2       Reflex irritability: 2  2       Muscle tone: 2  2       Respiratory effort: 2  2       Total: 9  9       Apgars assigned by: DULCE THOMPSON RN     Cord    Vessels: 3 Vessels Complications: None   Cord Blood Disposition: Lab Gases Sent?: No      Concepcion Resuscitation    Methods: None     Skin to Skin and Feeding Plan    Skin to skin initiation date/time: 1841    Skin to skin with: Mother  Skin to skin end date/time:     How do you plan to feed your baby: Breastfeeding     Labor Events and Shoulder Dystocia    Fetal Tracing Prior to Delivery: Category 1  Shoulder dystocia present?: Neg     Delivery (Maternal) (Provider to Complete) ([id number])    Episiotomy: None  Perineal lacerations: None    Labial laceration: left Repaired?: Yes   Est. blood loss (mL): 250     Blood Loss  Mother: Venessa Guillen #[id number]   Start of Mother's Information    IO Blood Loss  20 0300 - 20 0727    EBL (mL) Hospital Encounter 250 mL    Total  250 mL         End of Mother's Information  Mother: Venessa Guillen #[id number]          Delivery - Provider to Complete ([id number])    Delivering clinician: Noelle Dumont DO  Attempted Delivery Types (Choose all that apply): Spontaneous Vaginal Delivery  Delivery Type (Choose the 1 that will go to the Birth History): Vaginal, Spontaneous                   Other personnel:  Provider Role   Julia Anton RN Charge Nurse   Mily Funes RN Delivery Assist   Dulce Thompson RN Delivery Nurse   Tim  DO Noelle Assigned OBGYN Provider                Placenta    Delayed Cord Clamping: Done  Date/Time: 11/5/2020  7:05 AM  Removal: Spontaneous  Disposition: Hospital disposal           Anesthesia    Method: Intrathecal                Presentation and Position    Presentation: Vertex    Position: Middle Occiput Anterior                 Noelle Dumont DO

## 2020-11-05 NOTE — PLAN OF CARE
Pt was able to void. Mother and baby transferred to postpartum unit at 1010 via darnell díaz and lisa after completion of immediate recovery period. Patient oriented to room and instructed to call for assistance when up to the bathroom the next time. Report given to Cinthia JOSE who assumes patient care. Mother and baby bonding well and in stable condition upon transfer.

## 2020-11-05 NOTE — PLAN OF CARE
S:Delivery  B:Spontaneous Labor,38w3d    Lab Results   Component Value Date    GBS Negative 10/15/2020    with antibiotic treatment not indicated 4 hours prior to delivery.  A: Patient delivered   lac 1st degree at 0702 with Dr. ERNESTO Dumont in attendance and baby placed on mother's abdomen for delayed cord clamping. Baby dried and stimulated. Baby placed  skin to skin @ 0704. Apgars 9/9..  IV infusion of Oxytocin  infused. Placenta removal spontaneous. MD does not want placenta sent to pathology.  See Flowsheet for VS and PP checks. Labor care plan goals met, transition now to postpartum care.  R: Expect routine postpartum care. Anticipate first feeding within the hour or whenever infant displays feeding cues. Continue skin to skin. Prior discussion with mother indicates that feeding plan is Breast feeding. Educated mother on importance of exclusive breastfeeding, expected feeding readiness cues and encouraged her to observe for these cues while rooming in. Informed her that breastfeeding assistance would be provided.

## 2020-11-05 NOTE — PLAN OF CARE
Problem: Adult Inpatient Plan of Care  Goal: Plan of Care Review  Outcome: Improving     Data: Vital signs within normal limits. Postpartum checks within normal limits - see flow record. Patient eating and drinking normally. Patient able to empty bladder independently. . Patient ambulating independently..   No apparent signs of infection.  Patient Is performing self cares and Is able to care for infant. Positive attachment behaviors are observed with infant. Support persons are present.  Action:  Pain plan was discussed. Patient would like pain meds to be brought in when they are due. Patient was medicated during the shift for cramping. See MAR.  Response:   Patient reassessed within 1 hour after each medication for pain. Patient stated that pain had improved. Patient stated that she was comfortable. .   Plan: Anticipate discharge on 11/6 or 11/7.

## 2020-11-05 NOTE — ANESTHESIA POSTPROCEDURE EVALUATION
Patient: Venessa Guillen    * No procedures listed *    Diagnosis:* No pre-op diagnosis entered *  Diagnosis Additional Information: No value filed.    Anesthesia Type:  No value filed.    Note:  Anesthesia Post Evaluation    Patient location during evaluation: Bedside  Patient participation: Able to fully participate in evaluation  Level of consciousness: awake and alert  Pain management: adequate  Airway patency: patent  Cardiovascular status: acceptable  Respiratory status: acceptable  Hydration status: acceptable  PONV: none     Anesthetic complications: None          Last vitals:  Vitals:    11/05/20 0608 11/05/20 0637 11/05/20 0640   BP: (!) 173/94 133/78 137/86   Resp: 18     Temp: 36.6  C (97.9  F)     SpO2:  100% 100%         Electronically Signed By: Goldy Erickson CRNA, APRN YAZ  November 5, 2020  6:41 AM

## 2020-11-06 VITALS
OXYGEN SATURATION: 100 % | HEART RATE: 65 BPM | TEMPERATURE: 98.3 F | SYSTOLIC BLOOD PRESSURE: 111 MMHG | RESPIRATION RATE: 16 BRPM | DIASTOLIC BLOOD PRESSURE: 61 MMHG

## 2020-11-06 PROCEDURE — 250N000013 HC RX MED GY IP 250 OP 250 PS 637: Performed by: OBSTETRICS & GYNECOLOGY

## 2020-11-06 RX ADMIN — DOCUSATE SODIUM AND SENNOSIDES 1 TABLET: 8.6; 5 TABLET ORAL at 08:01

## 2020-11-06 RX ADMIN — IBUPROFEN 600 MG: 600 TABLET ORAL at 03:09

## 2020-11-06 RX ADMIN — IBUPROFEN 600 MG: 600 TABLET ORAL at 08:01

## 2020-11-06 NOTE — PROGRESS NOTES
PPD # 1    S: patient without complaints  O: /58   Pulse 62   Temp 97.5  F (36.4  C) (Oral)   Resp 16   LMP 02/10/2020   SpO2 100%   Breastfeeding Unknown    NAD  Abd: soft, nontender, fundus firm  Ext: calves nontender    A/P PPD # 1 s/p     Routine PP care.    Aniya Malik M.D.

## 2020-11-06 NOTE — DISCHARGE SUMMARY
[facility] Discharge Summary    Venessa Guillen MRN# [id number]   Age: 35 year old YOB: 1985     Date of Admission:  2020  Date of Discharge::  2020  3:25 PM  Admitting Physician:  Noelle Dumont DO  Discharge Physician:  Aniya Malik MD     [place] clinic: [facility]          Admission Diagnoses:   Intrauterine pregnancy at 38w3d  weeks gestation          Discharge Diagnosis:   Normal spontaneous vaginal delivery  Intrauterine pregnancy at 38w3d  weeks gestation          Procedures:   Procedure(s): No additional procedures performed       No other procedures performed during this admission           Medications Prior to Admission:     Medications Prior to Admission   Medication Sig Dispense Refill Last Dose     acetaminophen (TYLENOL) 500 MG tablet Take 2 tablets (1,000 mg) by mouth every 6 hours as needed for mild pain (Patient not taking: Reported on 2020) 100 tablet 0      docusate sodium (COLACE) 100 MG tablet Take 100 mg by mouth daily 60 tablet 1      polyethylene glycol (MIRALAX) 17 g packet Take 1 packet by mouth daily        Prenatal MV & Min w/FA-DHA (PRENATAL ADULT GUMMY/DHA/FA PO) Take 2 chew tab by mouth daily                 Discharge Medications:     Current Discharge Medication List      CONTINUE these medications which have NOT CHANGED    Details   acetaminophen (TYLENOL) 500 MG tablet Take 2 tablets (1,000 mg) by mouth every 6 hours as needed for mild pain  Qty: 100 tablet, Refills: 0    Associated Diagnoses:  (normal spontaneous vaginal delivery)      docusate sodium (COLACE) 100 MG tablet Take 100 mg by mouth daily  Qty: 60 tablet, Refills: 1    Associated Diagnoses: Constipation, unspecified constipation type; Encounter for supervision of normal first pregnancy in third trimester      polyethylene glycol (MIRALAX) 17 g packet Take 1 packet by mouth daily      Prenatal MV & Min w/FA-DHA (PRENATAL ADULT GUMMY/DHA/FA PO) Take 2 chew tab by  "mouth daily                    Consultations:   No consultations were requested during this admission          Brief History of Labor:   Venessa Guillen is a 35 year old  presented at 38 weeks 3 days by LMP who was admitted to L&D with labor and SROM. Her pregnancy was complicated by AMA. She was given an intrathecal injection for analgesia. She progressed spontaneously through labor until complete dilation. Patient did not labor down. She pushed effectively and delivered a viable female infant with Apgars 9/9 over an intact perineum. Weight is pending and skin-to-skin was performed. Delayed cord clamping was performed. Cord was clamped and cut. Placenta delivered via active management and was noted to be intact with a three vessel cord.   Left labial laceration was noted and repaired with 3-0 vicryl in standard fashion. QBL 250ml. Sponge and needle counts correct. Mom and baby were transported to postpartum in stable condition.   \"Mechelle\"           Hospital Course:   The patient's hospital course was unremarkable.  On discharge, her pain was well controlled. Vaginal bleeding is similar to peak menstrual flow.  Voiding without difficulty.  Ambulating well and tolerating a normal diet.  No fever.  Breastfeeding well.  Infant is stable.  No bowel movement yet.*  She was discharged on post-partum day #1.    Post-partum hemoglobin:   Hemoglobin   Date Value Ref Range Status   2020 14.4 11.7 - 15.7 g/dL Final             Discharge Instructions and Follow-Up:   Discharge diet: Regular   Discharge activity: Activity as tolerated   Discharge follow-up: Follow up with OB clinic in 6 weeks   Wound care: Drink plenty of fluids  Ice to area for comfort           Discharge Disposition:   Discharged to home      Attestation:  I have reviewed today's vital signs, notes, medications, labs and imaging.    Aniya Malik MD     "

## 2020-11-06 NOTE — PLAN OF CARE
Data: Vital signs within normal limits. Postpartum checks within normal limits - see flow record. Patient eating and drinking normally. Patient able to empty bladder independently. Patient ambulating independently. No apparent signs of infection. Lac, labial, healing well. Patient is performing self cares and is able to care for infant. Positive attachment behaviors are observed with infant. Support persons are present.  Action:  Pain plan was discussed. Patient will request pain med when she is ready for it. Patient was medicated during the shift for pain. See MAR. Patient education done about breastfeeding,  cares, postpartum cares, pain management/plan, fall risk,  safety, rest, and discharge from hospital. See flow record.  Response:   Patient reassessed within 1 hour after each medication for pain. Patient stated that pain had improved. Patient stated that she was comfortable.  Plan: Anticipate discharge on 20.

## 2020-12-10 ENCOUNTER — VIRTUAL VISIT (OUTPATIENT)
Dept: FAMILY MEDICINE | Facility: OTHER | Age: 35
End: 2020-12-10
Payer: COMMERCIAL

## 2020-12-10 PROCEDURE — 99421 OL DIG E/M SVC 5-10 MIN: CPT | Performed by: PHYSICIAN ASSISTANT

## 2020-12-10 NOTE — PROGRESS NOTES
"Date: 12/10/2020 11:51:55  Clinician: Waqar Valadez  Clinician NPI: [id number]  Patient: Venessa Guillen  Patient : 1985  Patient Address: [address] [place] [address] [place] [address]  Patient Phone: [phone]  Visit Protocol: URI  Patient Summary:  Venessa is a 35 year old ( : 1985 ) female who initiated a [facility] Visit for COVID-19 (Coronavirus) evaluation and screening. When asked the question \"Please sign me up to receive news, health information and promotions from [facility].\", Venessa responded \"No\".    Venessa states her symptoms started today.   Her symptoms consist of a headache, myalgia, chills, and malaise. Venessa also feels feverish.   Symptom details     Temperature: Her current temperature is 100.3 degrees Fahrenheit. Venessa has had a temperature over 100 degrees Fahrenheit for 1-2 days.     Headache: She states the headache is mild (1-3 on a 10 point pain scale).      Venessa denies having ear pain, wheezing, cough, nasal congestion, nausea, vomiting, rhinitis, facial pain or pressure, sore throat, teeth pain, ageusia, diarrhea, and anosmia. She also denies taking antibiotic medication in the past month, having recent facial or sinus surgery in the past 60 days, and having a sinus infection within the past year. She is not experiencing dyspnea.   Precipitating events  She has not recently been exposed to someone with influenza. Venessa has been in close contact with the following high risk individuals: children under the age of 5.   Pertinent COVID-19 (Coronavirus) information  Venessa does not work or volunteer as healthcare worker or a . In the past 14 days, Venessa has not worked or volunteered at a healthcare facility or group living setting.   In the past 14 days, she also has not lived in a congregate living setting.   Venessa has not had a close contact with a laboratory-confirmed COVID-19 patient within 14 days of symptom onset.    Since 2019, Venessa has not been tested for " COVID-19 and has not had upper respiratory infection or influenza-like illness.   Pertinent medical history  She has not been told by her provider to avoid NSAIDs.   Venessa does not get yeast infections when she takes antibiotics.   Venessa does not have diabetes. She denies having immunosuppressive conditions (e.g., chemotherapy, HIV, organ transplant, long-term use of steroids or other immunosuppressive medications, splenectomy). She does not have severe COPD and congestive heart failure. She does not have asthma.   Venessa does not need a return to work/school note.   Weight: 140 lbs   Venessa does not smoke or use smokeless tobacco.   She denies pregnancy and is breastfeeding. Her last period was over a month ago.   Weight: 140 lbs    MEDICATIONS: Prenatal oral, ALLERGIES: Ceclor  Clinician Response:  Dear Venessa,   Your symptoms show that you may have coronavirus (COVID-19). This illness can cause fever, cough and trouble breathing. Many people get a mild case and get better on their own. Some people can get very sick.  What should I do?  We would like to test you for this virus.   1. Please call [phone] to schedule your visit. Explain that you were referred by [facility] to have a COVID-19 test. Be ready to share your [facility] visit ID number.  * If you need to schedule in [facility] please call [phone] or for [employer] employees please call [phone].  * If you need to schedule in the [place] area please call [phone]. [place] employees call [phone].  The following will serve as your written order for this COVID Test, ordered by me, for the indication of suspected COVID [Z20.828]: The test will be ordered in [employer], our electronic health record, after you are scheduled. It will show as ordered and authorized by Joe Crespo MD.  Order: COVID-19 (Coronavirus) PCR for SYMPTOMATIC testing from [facility].   2. When it's time for your COVID test:  Stay at least 6 feet away from others. (If someone will  "drive you to your test, stay in the backseat, as far away from the  as you can.)   Cover your mouth and nose with a mask, tissue or washcloth.  Go straight to the testing site. Don't make any stops on the way there or back.      3.Starting now: Stay home and away from others (self-isolate) until:   You've had no fever---and no medicine that reduces fever---for one full day (24 hours). And...   Your other symptoms have gotten better. For example, your cough or breathing has improved. And...   At least 10 days have passed since your symptoms started.       During this time, don't leave the house except for testing or medical care.   Stay in your own room, even for meals. Use your own bathroom if you can.   Stay away from others in your home. No hugging, kissing or shaking hands. No visitors.  Don't go to work, school or anywhere else.    Clean \"high touch\" surfaces often (doorknobs, counters, handles, etc.). Use a household cleaning spray or wipes. You'll find a full list of  on the EPA website: www.epa.gov/pesticide-registration/list-n-disinfectants-use-against-sars-cov-2.   Cover your mouth and nose with a mask, tissue or washcloth to avoid spreading germs.  Wash your hands and face often. Use soap and water.  Caregivers in these groups are at risk for severe illness due to COVID-19:  o People 65 years and older  o People who live in a nursing home or long-term care facility  o People with chronic disease (lung, heart, cancer, diabetes, kidney, liver, immunologic)  o People who have a weakened immune system, including those who:   Are in cancer treatment  Take medicine that weakens the immune system, such as corticosteroids  Had a bone marrow or organ transplant  Have an immune deficiency  Have poorly controlled HIV or AIDS  Are obese (body mass index of 40 or higher)  Smoke regularly   o Caregivers should wear gloves while washing dishes, handling laundry and cleaning bedrooms and bathrooms.  o Use " caution when washing and drying laundry: Don't shake dirty laundry, and use the warmest water setting that you can.  o For more tips, go to www.cdc.gov/coronavirus/2019-ncov/downloads/10Things.pdf.    How can I take care of myself?    Get lots of rest. Drink extra fluids (unless a doctor has told you not to).   Take Tylenol (acetaminophen) for fever or pain. If you have liver or kidney problems, ask your family doctor if it's okay to take Tylenol.   Adults can take either:    650 mg (two 325 mg pills) every 4 to 6 hours, or...   1,000 mg (two 500 mg pills) every 8 hours as needed.    Note: Don't take more than 3,000 mg in one day. Acetaminophen is found in many medicines (both prescribed and over-the-counter medicines). Read all labels to be sure you don't take too much.   For children, check the Tylenol bottle for the right dose. The dose is based on the child's age or weight.    If you have other health problems (like cancer, heart failure, an organ transplant or severe kidney disease): Call your specialty clinic if you don't feel better in the next 2 days.       Know when to call 911. Emergency warning signs include:    Trouble breathing or shortness of breath Pain or pressure in the chest that doesn't go away Feeling confused like you haven't felt before, or not being able to wake up Bluish-colored lips or face.  Where can I get more information?   [facility] [place] -- About COVID-19: [email or web address]/   CDC -- What to Do If You're Sick: www.cdc.gov/coronavirus/2019-ncov/about/steps-when-sick.html   CDC -- Ending Home Isolation: www.cdc.gov/coronavirus/2019-ncov/hcp/disposition-in-home-patients.html   CDC -- Caring for Someone: www.cdc.gov/coronavirus/2019-ncov/if-you-are-sick/care-for-someone.html   [facility] -- Interim Guidance for Hospital Discharge to Home: www.health.[facility].[place]/diseases/coronavirus/hcp/hospdischarge.pdf   [facility] clinical trials (COVID-19 research studies):  clinicalaffairs.[facility]-clinical-trials    Below are the COVID-19 hotlines at the [place] Department of Health ([facility]). Interpreters are available.    For health questions: Call 158-237-5892 or 1-560.690.1649 (7 a.m. to 7 p.m.) For questions about schools and childcare: Call 731-654-1096 or 1-759.658.9383 (7 a.m. to 7 p.m.)    Diagnosis: Contact with and (suspected) exposure to other viral communicable diseases  Diagnosis ICD: Z20.828

## 2020-12-15 ENCOUNTER — PRENATAL OFFICE VISIT (OUTPATIENT)
Dept: OBGYN | Facility: CLINIC | Age: 35
End: 2020-12-15
Payer: COMMERCIAL

## 2020-12-15 VITALS
SYSTOLIC BLOOD PRESSURE: 113 MMHG | HEIGHT: 66 IN | DIASTOLIC BLOOD PRESSURE: 72 MMHG | RESPIRATION RATE: 12 BRPM | BODY MASS INDEX: 23.66 KG/M2 | WEIGHT: 147.2 LBS | TEMPERATURE: 97.8 F | HEART RATE: 68 BPM

## 2020-12-15 DIAGNOSIS — K59.00 CONSTIPATION, UNSPECIFIED CONSTIPATION TYPE: ICD-10-CM

## 2020-12-15 DIAGNOSIS — Z30.430 ENCOUNTER FOR IUD INSERTION: ICD-10-CM

## 2020-12-15 DIAGNOSIS — Z30.430 ENCOUNTER FOR INSERTION OF INTRAUTERINE CONTRACEPTIVE DEVICE: ICD-10-CM

## 2020-12-15 PROCEDURE — 99207 PR POST PARTUM EXAM: CPT | Mod: 25 | Performed by: OBSTETRICS & GYNECOLOGY

## 2020-12-15 PROCEDURE — 58300 INSERT INTRAUTERINE DEVICE: CPT | Performed by: OBSTETRICS & GYNECOLOGY

## 2020-12-15 RX ORDER — DOCUSATE SODIUM 100 MG/1
100 CAPSULE, LIQUID FILLED ORAL 2 TIMES DAILY
Qty: 60 CAPSULE | Refills: 3 | Status: SHIPPED | OUTPATIENT
Start: 2020-12-15 | End: 2021-03-08

## 2020-12-15 ASSESSMENT — PATIENT HEALTH QUESTIONNAIRE - PHQ9
5. POOR APPETITE OR OVEREATING: NOT AT ALL
SUM OF ALL RESPONSES TO PHQ QUESTIONS 1-9: 0

## 2020-12-15 ASSESSMENT — ANXIETY QUESTIONNAIRES
7. FEELING AFRAID AS IF SOMETHING AWFUL MIGHT HAPPEN: NOT AT ALL
6. BECOMING EASILY ANNOYED OR IRRITABLE: NOT AT ALL
GAD7 TOTAL SCORE: 0
5. BEING SO RESTLESS THAT IT IS HARD TO SIT STILL: NOT AT ALL
IF YOU CHECKED OFF ANY PROBLEMS ON THIS QUESTIONNAIRE, HOW DIFFICULT HAVE THESE PROBLEMS MADE IT FOR YOU TO DO YOUR WORK, TAKE CARE OF THINGS AT HOME, OR GET ALONG WITH OTHER PEOPLE: NOT DIFFICULT AT ALL
1. FEELING NERVOUS, ANXIOUS, OR ON EDGE: NOT AT ALL
2. NOT BEING ABLE TO STOP OR CONTROL WORRYING: NOT AT ALL
3. WORRYING TOO MUCH ABOUT DIFFERENT THINGS: NOT AT ALL

## 2020-12-15 ASSESSMENT — MIFFLIN-ST. JEOR: SCORE: 1371.5

## 2020-12-15 NOTE — NURSING NOTE
"Initial /72 (BP Location: Left arm, Patient Position: Sitting, Cuff Size: Adult Regular)   Pulse 68   Temp 97.8  F (36.6  C) (Tympanic)   Resp 12   Ht 1.664 m (5' 5.5\")   Wt 66.8 kg (147 lb 3.2 oz)   LMP 02/10/2020   Breastfeeding Yes   BMI 24.12 kg/m   Estimated body mass index is 24.12 kg/m  as calculated from the following:    Height as of this encounter: 1.664 m (5' 5.5\").    Weight as of this encounter: 66.8 kg (147 lb 3.2 oz). .      "

## 2020-12-15 NOTE — PROGRESS NOTES
"[facility]    Post Partum Office Visit    CC: Post partum visit    HPI: Venessa Guillen is a 35 year old  who presents for a 6 week post-partum visit.  Patient delivered on  at 38w3d gestation.  Patient delivered via , with intrathecal injection for anesthesia.  Complications During Labor: None    Only complaint post partum has been continued constipation. Has been using Colace daily and Miralax as needed. Still having hard stools.      Information  Name: Mechelle  Sex: Female  Complications: None  Feeding Method:Breast    Maternal Information  Postpartum Depression:No  Resumed [place]:No  Last Pap Smear:  NIL, HPV negative  Birth Control Method:Desires IUD    ROS:  General/Constitutional:  Denies chills or fever  Respiratory: Denies shortness of breath, cough, wheezing   Cardiovascular: Denies chest pain, exertional pain, irregular heartbeat  Gastrointestinal:  +constipation   Genitourinary: Denies hematuria, difficulty urinating, frequency, or dysuria   Skin: Denies dry skin, itching, rash, new skin lesions  Musculoskeletal: Denies aching muscles or joints, swelling in joints, back pain, shoulder pain, or painful feet, no peripheral edema  Psychiatric: Denies post partum depression    Physical Exam:   Vitals:    12/15/20 0925   BP: 113/72   BP Location: Left arm   Patient Position: Sitting   Cuff Size: Adult Regular   Pulse: 68   Resp: 12   Temp: 97.8  F (36.6  C)   TempSrc: Tympanic   Weight: 66.8 kg (147 lb 3.2 oz)   Height: 1.664 m (5' 5.5\")      Estimated body mass index is 24.12 kg/m  as calculated from the following:    Height as of this encounter: 1.664 m (5' 5.5\").    Weight as of this encounter: 66.8 kg (147 lb 3.2 oz).    General appearance: well-hydrated, A&O x 3, no apparent distress  Lungs: Equal expansion bilaterally, no accessory muscle use  Heart: No heaves or thrills. No peripheral varicosities  Extremities: no edema, no calf tenderness  Neuro: " CN II-XII grossly intact  Genitourinary:  External genitalia: no erythema, no lesions.   Urethral meatus appropriate location without lesions or prolapse  Urethra: No masses, tenderness, or scarring  Bladder no fullness, masses, or tenderness.  Anus and Perineum: Unremarkable, no visible lesions  Vagina: Normal, healthy pink mucosa without any lesions. Physiologic vaginal discharge. Vaginal laceration well healed, no visible suture  Cervix: normal appearance, no cervical motion tenderness.   Uterus: involuted, normal size, shape and consistency.       IUD Placement Procedure Note      Venessa Guillen  1985  [id number]    Indications:    Venessa Guillen is a 35 year old year old female, who is here today for IUD placement.    The patient was counseled on the risks (including including risk of infection, uterine perforation, cramping), benefits (high efficacy, low maintenance birth control, reduced risk of uterine cancer and hyperplasia, improvement in menstrual bleeding), and alternatives of the procedure. Desires to proceed with IUD placement.      Procedure:  Verbal and written consent were obtained. The patient was placed in the dorsal lithotomy position.  A speculum was placed in the vagina and the cervix was then cleaned with betadine swabs. The Mirena IUD was loaded, advanced to the fundus, pulled back 1cm, deployed and advanced to the fundus. IUD strings were cut 3cm from the external cervical os. The uterus sounded to 6.5 cm. The patient tolerated the procedure well and there were no complications. EBL: 0cc.       Post Procedure:    Patient was given post procedure expectations and precautions. She is comfortable to monitor at home with self-string checks.       Assessment and Plan:     Encounter Diagnoses   Name Primary?     Routine postpartum follow-up Yes     Constipation, unspecified constipation type      Encounter for IUD insertion      Encounter for insertion of intrauterine contraceptive device         Appropriate post partum recovery. IUD placed today for contraception. No concerns for post partum depression.    Plan for routine GYN cares, PAP smear due 2023.       Noelle Dumont DO

## 2020-12-16 ASSESSMENT — ANXIETY QUESTIONNAIRES: GAD7 TOTAL SCORE: 0

## 2021-01-05 ENCOUNTER — MEDICAL CORRESPONDENCE (OUTPATIENT)
Dept: HEALTH INFORMATION MANAGEMENT | Facility: CLINIC | Age: 36
End: 2021-01-05

## 2021-01-27 ENCOUNTER — MYC MEDICAL ADVICE (OUTPATIENT)
Dept: OBGYN | Facility: CLINIC | Age: 36
End: 2021-01-27

## 2021-01-29 RX ORDER — BREAST PUMP
1 EACH MISCELLANEOUS DAILY
Qty: 1 EACH | Refills: 0 | Status: SHIPPED | OUTPATIENT
Start: 2021-01-29 | End: 2022-04-12

## 2021-01-29 NOTE — TELEPHONE ENCOUNTER
Hand signed rx faxed to [facility] [place].    -Carmina TORO [facility]  [facility]

## 2021-03-08 ENCOUNTER — OFFICE VISIT (OUTPATIENT)
Dept: FAMILY MEDICINE | Facility: CLINIC | Age: 36
End: 2021-03-08
Payer: COMMERCIAL

## 2021-03-08 ENCOUNTER — MYC MEDICAL ADVICE (OUTPATIENT)
Dept: FAMILY MEDICINE | Facility: CLINIC | Age: 36
End: 2021-03-08

## 2021-03-08 ENCOUNTER — HOSPITAL ENCOUNTER (OUTPATIENT)
Dept: CARDIOLOGY | Facility: CLINIC | Age: 36
Discharge: HOME | End: 2021-03-08
Attending: FAMILY MEDICINE | Admitting: FAMILY MEDICINE
Payer: COMMERCIAL

## 2021-03-08 VITALS
SYSTOLIC BLOOD PRESSURE: 100 MMHG | HEIGHT: 66 IN | BODY MASS INDEX: 21.63 KG/M2 | OXYGEN SATURATION: 98 % | HEART RATE: 106 BPM | RESPIRATION RATE: 16 BRPM | DIASTOLIC BLOOD PRESSURE: 60 MMHG | WEIGHT: 134.6 LBS | TEMPERATURE: 97.9 F

## 2021-03-08 DIAGNOSIS — R00.0 TACHYCARDIA: Primary | ICD-10-CM

## 2021-03-08 DIAGNOSIS — R00.0 TACHYCARDIA: ICD-10-CM

## 2021-03-08 DIAGNOSIS — E05.90 HYPERTHYROIDISM: ICD-10-CM

## 2021-03-08 LAB
T4 FREE SERPL-MCNC: 3.06 NG/DL (ref 0.76–1.46)
TSH SERPL DL<=0.005 MIU/L-ACNC: <0.01 MU/L (ref 0.4–4)

## 2021-03-08 PROCEDURE — 36415 COLL VENOUS BLD VENIPUNCTURE: CPT | Performed by: FAMILY MEDICINE

## 2021-03-08 PROCEDURE — 93227 XTRNL ECG REC<48 HR R&I: CPT | Performed by: INTERNAL MEDICINE

## 2021-03-08 PROCEDURE — 84439 ASSAY OF FREE THYROXINE: CPT | Performed by: FAMILY MEDICINE

## 2021-03-08 PROCEDURE — 93225 XTRNL ECG REC<48 HRS REC: CPT

## 2021-03-08 PROCEDURE — 99213 OFFICE O/P EST LOW 20 MIN: CPT | Performed by: FAMILY MEDICINE

## 2021-03-08 PROCEDURE — 84443 ASSAY THYROID STIM HORMONE: CPT | Performed by: FAMILY MEDICINE

## 2021-03-08 RX ORDER — METOPROLOL TARTRATE 25 MG/1
25 TABLET, FILM COATED ORAL 2 TIMES DAILY
Qty: 30 TABLET | Refills: 11 | Status: SHIPPED | OUTPATIENT
Start: 2021-03-08 | End: 2021-03-19

## 2021-03-08 ASSESSMENT — MIFFLIN-ST. JEOR: SCORE: 1314.35

## 2021-03-08 NOTE — PATIENT INSTRUCTIONS
(R00.0) Tachycardia  (primary encounter diagnosis)  Comment:   Plan: TSH, T4 FREE, Holter Monitor 24 hour Adult         Pediatric, metoprolol tartrate (LOPRESSOR) 25         MG tablet, CARDIOLOGY EVAL ADULT REFERRAL              We discussed the issues and ordered the thyroid labs and the 24 hour Holter monitor.   We will call the results and recommendations. Avoid caffeine and other stimulants.   If needed the referral to Cardiology is done and call [phone] to schedule.   Metoprolol at 25 mg daily is ordered and call the pharmacy to get it. Use as needed.

## 2021-03-08 NOTE — PROGRESS NOTES
"        Melchor Clifford is a 35 year old who presents for the following health issues     HPI       Chief Complaint   Patient presents with     Palpitations     Elevated heart rate and palpitations over the past 1-2 weeks.  Intermittent, feels more in the night when nursing baby.  Elevated heart rate more in the morning, palpitations more at night. Had a couple of morning waking with weakness and shakey.  Occasional dizziness with head movement.  Weight loss of 8-10 pounds in February.        Current Outpatient Medications   Medication Instructions     acetaminophen (TYLENOL) 1,000 mg, Oral, EVERY 6 HOURS PRN     levonorgestrel (MIRENA) 20 MCG/24HR IUD 1 each, Intrauterine, ONCE     Misc. Devices (BREAST PUMP) MISC 1 each, Does not apply, DAILY     Prenatal MV & Min w/FA-DHA (PRENATAL ADULT GUMMY/DHA/FA PO) 2 chew tab, Oral, DAILY     Patient Active Problem List   Diagnosis     CARDIOVASCULAR SCREENING; LDL GOAL LESS THAN 160     Prenatal care, subsequent pregnancy     Multigravida of advanced maternal age in third trimester      (spontaneous vaginal delivery)     Encounter for IUD insertion     Tachycardia       Blood pressure 100/60, pulse 106, temperature 97.9  F (36.6  C), temperature source Tympanic, resp. rate 16, height 1.664 m (5' 5.5\"), weight 61.1 kg (134 lb 9.6 oz), SpO2 98 %, currently breastfeeding.    Exam:  GENERAL APPEARANCE: healthy, alert and no distress  EYES: EOMI,  PERRL  NECK: no adenopathy, no asymmetry, masses, or scars and thyroid normal to palpation  RESP: lungs clear to auscultation - no rales, rhonchi or wheezes  CV: regular rates and rhythm, normal S1 S2, no S3 or S4 and no murmur, click or rub -  MS: extremities normal- no gross deformities noted, no evidence of inflammation in joints, FROM in all extremities.  SKIN: no suspicious lesions or rashes  PSYCH: mentation appears normal and affect normal/bright      (R00.0) Tachycardia  (primary encounter diagnosis)  Comment:   Plan: " TSH, T4 FREE, Holter Monitor 24 hour Adult         Pediatric, metoprolol tartrate (LOPRESSOR) 25         MG tablet, CARDIOLOGY EVAL ADULT REFERRAL              We discussed the issues and ordered the thyroid labs and the 24 hour Holter monitor.   We will call the results and recommendations. Avoid caffeine and other stimulants.   If needed the referral to Cardiology is done and call [phone] to schedule.   Metoprolol at 25 mg daily is ordered and call the pharmacy to get it. Use as needed.     Andrea Pagan MD

## 2021-03-10 ENCOUNTER — VIRTUAL VISIT (OUTPATIENT)
Dept: ENDOCRINOLOGY | Facility: CLINIC | Age: 36
End: 2021-03-10
Payer: COMMERCIAL

## 2021-03-10 DIAGNOSIS — E05.90 HYPERTHYROIDISM: Primary | ICD-10-CM

## 2021-03-10 PROCEDURE — 99204 OFFICE O/P NEW MOD 45 MIN: CPT | Mod: TEL | Performed by: INTERNAL MEDICINE

## 2021-03-10 NOTE — PROGRESS NOTES
Venessa is a 35 year old who is being evaluated via a billable telephone visit.      What phone number would you like to be contacted at? [phone]  How would you like to obtain your AVS? Tatumharlauri    CC: Hyperthyroidism.     HPI:   Patient presents for evaluation of hyperthyroidism.   This was found on work up for palpitations. They began ~ 2.5 weeks ago the day she got the COVID vaccine.   Then developed tremors and muscle weakness/cramps.   Palpitations are unrelated to activity.   She has lost 8 pounds in this time frame.     Her daughter is 4 months old. This is her second child.   She has an IUD in place now. She is breast feeding.     No prior thyroid disease.     No neck swelling or tenderness.     No new medications or supplements.   Given metoprolol but has not used yet as HR lower recently.     She had not noticed an increase in anxiety or trouble sleeping.     No eye issues.     ROS: 10 point ROS neg other than the symptoms noted above in the HPI.    PMH:   Patient Active Problem List   Diagnosis     CARDIOVASCULAR SCREENING; LDL GOAL LESS THAN 160     Prenatal care, subsequent pregnancy     Multigravida of advanced maternal age in third trimester      (spontaneous vaginal delivery)     Encounter for IUD insertion     Tachycardia      Meds:  Current Outpatient Medications   Medication     acetaminophen (TYLENOL) 500 MG tablet     levonorgestrel (MIRENA) 20 MCG/24HR IUD     metoprolol tartrate (LOPRESSOR) 25 MG tablet     Misc. Devices (BREAST PUMP) MISC     Prenatal MV & Min w/FA-DHA (PRENATAL ADULT GUMMY/DHA/FA PO)     No current facility-administered medications for this visit.       FHX:   No thyroid disease.     SHX:  Non-smoker.     Exam:   Gen: In NAD.      A/P:   Hyperthyroidism - DDx includes toxic nodule, Graves', and thyroiditis. She had a baby four months ago and the COVID vaccine last month. Either could provoke thyroiditis. Reviewed potential treatments of I 131, methimazole, and surgery  with the patient. Given that she is breast-feeding, would recommend PTU. No uptake and scan as she is breast feeding. We will check TSI, ESR, CRP, and a thyroid US.   -Schedule ultrasound and labs.     Due to the COVID 19 pandemic this visit was a telephone/video visit in order to help prevent spread of infection in this high risk patient and the general population. The patient gave verbal consent for the visit today.    I have independently reviewed and interpreted labs, imaging as indicated.     Chart review/prep time 1  0825  Chart review/prep time 2 0830  Visit Start time 0830  Visit Stop time 0851  26 minutes spent on the date of the encounter doing chart review, history and exam, documentation and further activities as noted above.   If this were a face to face visit, it would be billed as [id number].    Zachery Boone MD on 3/10/2021 at 8:51 AM

## 2021-03-10 NOTE — LETTER
3/10/2021         RE: Venessa Guillen  [address]  [facility] [address]        Dear Colleague,    Thank you for referring your patient, Venessa Guillen, to the [facility]. Please see a copy of my visit note below.    Venessa is a 35 year old who is being evaluated via a billable telephone visit.      What phone number would you like to be contacted at? [phone]  How would you like to obtain your AVS? MyChart    CC: Hyperthyroidism.     HPI:   Patient presents for evaluation of hyperthyroidism.   This was found on work up for palpitations. They began ~ 2.5 weeks ago the day she got the COVID vaccine.   Then developed tremors and muscle weakness/cramps.   Palpitations are unrelated to activity.   She has lost 8 pounds in this time frame.     Her daughter is 4 months old. This is her second child.   She has an IUD in place now. She is breast feeding.     No prior thyroid disease.     No neck swelling or tenderness.     No new medications or supplements.   Given metoprolol but has not used yet as HR lower recently.     She had not noticed an increase in anxiety or trouble sleeping.     No eye issues.     ROS: 10 point ROS neg other than the symptoms noted above in the HPI.    PMH:   Patient Active Problem List   Diagnosis     CARDIOVASCULAR SCREENING; LDL GOAL LESS THAN 160     Prenatal care, subsequent pregnancy     Multigravida of advanced maternal age in third trimester      (spontaneous vaginal delivery)     Encounter for IUD insertion     Tachycardia      Meds:  Current Outpatient Medications   Medication     acetaminophen (TYLENOL) 500 MG tablet     levonorgestrel (MIRENA) 20 MCG/24HR IUD     metoprolol tartrate (LOPRESSOR) 25 MG tablet     Misc. Devices (BREAST PUMP) MISC     Prenatal MV & Min w/FA-DHA (PRENATAL ADULT GUMMY/DHA/FA PO)     No current facility-administered medications for this visit.       FHX:   No thyroid disease.     SHX:  Non-smoker.     Exam:   Gen: In NAD.       A/P:   Hyperthyroidism - DDx includes toxic nodule, Graves', and thyroiditis. She had a baby four months ago and the COVID vaccine last month. Either could provoke thyroiditis. Reviewed potential treatments of I 131, methimazole, and surgery with the patient. Given that she is breast-feeding, would recommend PTU. No uptake and scan as she is breast feeding. We will check TSI, ESR, CRP, and a thyroid US.   -Schedule ultrasound and labs.     Due to the COVID 19 pandemic this visit was a telephone/video visit in order to help prevent spread of infection in this high risk patient and the general population. The patient gave verbal consent for the visit today.    I have independently reviewed and interpreted labs, imaging as indicated.     Chart review/prep time 1  0825  Chart review/prep time 2 0830  Visit Start time 0830  Visit Stop time 0851  26 minutes spent on the date of the encounter doing chart review, history and exam, documentation and further activities as noted above.   If this were a face to face visit, it would be billed as [id number].    Zachery Boone MD on 3/10/2021 at 8:51 AM        Again, thank you for allowing me to participate in the care of your patient.        Sincerely,        Zachery Boone MD

## 2021-03-11 ENCOUNTER — HOSPITAL ENCOUNTER (OUTPATIENT)
Dept: ULTRASOUND IMAGING | Facility: CLINIC | Age: 36
Discharge: HOME | End: 2021-03-11
Attending: INTERNAL MEDICINE | Admitting: INTERNAL MEDICINE
Payer: COMMERCIAL

## 2021-03-11 ENCOUNTER — MEDICAL CORRESPONDENCE (OUTPATIENT)
Dept: HEALTH INFORMATION MANAGEMENT | Facility: CLINIC | Age: 36
End: 2021-03-11

## 2021-03-11 DIAGNOSIS — E05.90 HYPERTHYROIDISM: ICD-10-CM

## 2021-03-11 LAB
CRP SERPL-MCNC: 7.1 MG/L (ref 0–8)
ERYTHROCYTE [SEDIMENTATION RATE] IN BLOOD BY WESTERGREN METHOD: 8 MM/H (ref 0–20)

## 2021-03-11 PROCEDURE — 99000 SPECIMEN HANDLING OFFICE-LAB: CPT | Performed by: INTERNAL MEDICINE

## 2021-03-11 PROCEDURE — 76536 US EXAM OF HEAD AND NECK: CPT

## 2021-03-11 PROCEDURE — 84445 ASSAY OF TSI GLOBULIN: CPT | Mod: 90 | Performed by: INTERNAL MEDICINE

## 2021-03-11 PROCEDURE — 36415 COLL VENOUS BLD VENIPUNCTURE: CPT | Performed by: INTERNAL MEDICINE

## 2021-03-11 PROCEDURE — 86140 C-REACTIVE PROTEIN: CPT | Performed by: INTERNAL MEDICINE

## 2021-03-11 PROCEDURE — 85652 RBC SED RATE AUTOMATED: CPT | Performed by: INTERNAL MEDICINE

## 2021-03-12 ENCOUNTER — MYC MEDICAL ADVICE (OUTPATIENT)
Dept: FAMILY MEDICINE | Facility: CLINIC | Age: 36
End: 2021-03-12

## 2021-03-12 ENCOUNTER — HOSPITAL ENCOUNTER (OUTPATIENT)
Dept: LAB | Facility: CLINIC | Age: 36
Discharge: HOME | End: 2021-03-12
Attending: FAMILY MEDICINE | Admitting: FAMILY MEDICINE
Payer: COMMERCIAL

## 2021-03-12 DIAGNOSIS — R00.0 TACHYCARDIA: Primary | ICD-10-CM

## 2021-03-12 DIAGNOSIS — R00.0 TACHYCARDIA: ICD-10-CM

## 2021-03-12 PROBLEM — I44.1 WENCKEBACH SECOND DEGREE AV BLOCK: Status: ACTIVE | Noted: 2021-03-12

## 2021-03-12 PROBLEM — E05.90 HYPERTHYROIDISM: Status: ACTIVE | Noted: 2021-03-12

## 2021-03-12 LAB
ALBUMIN SERPL-MCNC: 3.6 G/DL (ref 3.4–5)
ALP SERPL-CCNC: 118 U/L (ref 40–150)
ALT SERPL W P-5'-P-CCNC: 36 U/L (ref 0–50)
ANION GAP SERPL CALCULATED.3IONS-SCNC: 6 MMOL/L (ref 3–14)
AST SERPL W P-5'-P-CCNC: 14 U/L (ref 0–45)
BILIRUB SERPL-MCNC: 0.5 MG/DL (ref 0.2–1.3)
BUN SERPL-MCNC: 16 MG/DL (ref 7–30)
CALCIUM SERPL-MCNC: 8.4 MG/DL (ref 8.5–10.1)
CHLORIDE SERPL-SCNC: 109 MMOL/L (ref 94–109)
CO2 SERPL-SCNC: 26 MMOL/L (ref 20–32)
CREAT SERPL-MCNC: 0.61 MG/DL (ref 0.52–1.04)
ERYTHROCYTE [DISTWIDTH] IN BLOOD BY AUTOMATED COUNT: 11.2 % (ref 10–15)
GFR SERPL CREATININE-BSD FRML MDRD: >90 ML/MIN/{1.73_M2}
GLUCOSE SERPL-MCNC: 109 MG/DL (ref 70–99)
HCT VFR BLD AUTO: 41.9 % (ref 35–47)
HGB BLD-MCNC: 13.9 G/DL (ref 11.7–15.7)
MCH RBC QN AUTO: 28.6 PG (ref 26.5–33)
MCHC RBC AUTO-ENTMCNC: 33.2 G/DL (ref 31.5–36.5)
MCV RBC AUTO: 86 FL (ref 78–100)
PLATELET # BLD AUTO: 308 10E9/L (ref 150–450)
POTASSIUM SERPL-SCNC: 3.9 MMOL/L (ref 3.4–5.3)
PROT SERPL-MCNC: 7 G/DL (ref 6.8–8.8)
RBC # BLD AUTO: 4.86 10E12/L (ref 3.8–5.2)
SODIUM SERPL-SCNC: 141 MMOL/L (ref 133–144)
WBC # BLD AUTO: 6.1 10E9/L (ref 4–11)

## 2021-03-12 PROCEDURE — 36415 COLL VENOUS BLD VENIPUNCTURE: CPT | Performed by: FAMILY MEDICINE

## 2021-03-12 PROCEDURE — 80053 COMPREHEN METABOLIC PANEL: CPT | Performed by: FAMILY MEDICINE

## 2021-03-12 PROCEDURE — 85027 COMPLETE CBC AUTOMATED: CPT | Performed by: FAMILY MEDICINE

## 2021-03-12 NOTE — TELEPHONE ENCOUNTER
The labs are ordered. Please get her the instructions for these.     The thyroid and the heart issues are separate, as far as we know.     Andrea Pagan MD

## 2021-03-12 NOTE — TELEPHONE ENCOUNTER
Please see My Chart message. Patient is requesting labs. Labs are ready.Jessie Weir RN    I called her and explained the Holter. I discussed it with Cardiology and they think the   Wenkebach is not related to the thyroid. She will avoid stimulants for now and monitor  For any lightheadedness. Andrea Pagan MD

## 2021-03-12 NOTE — TELEPHONE ENCOUNTER
Sent patient mychart response. Will await patient response if she would like a copy of the holter monitor.

## 2021-03-15 ENCOUNTER — APPOINTMENT (OUTPATIENT)
Dept: MRI IMAGING | Facility: CLINIC | Age: 36
End: 2021-03-15
Attending: EMERGENCY MEDICINE
Payer: COMMERCIAL

## 2021-03-15 ENCOUNTER — HOSPITAL ENCOUNTER (EMERGENCY)
Facility: CLINIC | Age: 36
Discharge: HOME | End: 2021-03-15
Attending: EMERGENCY MEDICINE | Admitting: EMERGENCY MEDICINE
Payer: COMMERCIAL

## 2021-03-15 VITALS
OXYGEN SATURATION: 96 % | HEIGHT: 65 IN | WEIGHT: 134 LBS | TEMPERATURE: 95.1 F | SYSTOLIC BLOOD PRESSURE: 116 MMHG | RESPIRATION RATE: 16 BRPM | DIASTOLIC BLOOD PRESSURE: 65 MMHG | HEART RATE: 85 BPM | BODY MASS INDEX: 22.33 KG/M2

## 2021-03-15 DIAGNOSIS — H81.399 PERIPHERAL VERTIGO, UNSPECIFIED LATERALITY: ICD-10-CM

## 2021-03-15 DIAGNOSIS — I44.1 WENCKEBACH SECOND DEGREE AV BLOCK: ICD-10-CM

## 2021-03-15 DIAGNOSIS — R00.2 PALPITATIONS: ICD-10-CM

## 2021-03-15 PROCEDURE — 93010 ELECTROCARDIOGRAM REPORT: CPT | Performed by: EMERGENCY MEDICINE

## 2021-03-15 PROCEDURE — A9585 GADOBUTROL INJECTION: HCPCS | Performed by: EMERGENCY MEDICINE

## 2021-03-15 PROCEDURE — 255N000002 HC RX 255 OP 636: Performed by: EMERGENCY MEDICINE

## 2021-03-15 PROCEDURE — 99285 EMERGENCY DEPT VISIT HI MDM: CPT | Mod: 25 | Performed by: EMERGENCY MEDICINE

## 2021-03-15 PROCEDURE — 70553 MRI BRAIN STEM W/O & W/DYE: CPT

## 2021-03-15 PROCEDURE — 93005 ELECTROCARDIOGRAM TRACING: CPT | Performed by: EMERGENCY MEDICINE

## 2021-03-15 RX ORDER — GADOBUTROL 604.72 MG/ML
6 INJECTION INTRAVENOUS ONCE
Status: COMPLETED | OUTPATIENT
Start: 2021-03-15 | End: 2021-03-15

## 2021-03-15 RX ADMIN — GADOBUTROL 6 ML: 604.72 INJECTION INTRAVENOUS at 21:14

## 2021-03-15 ASSESSMENT — MIFFLIN-ST. JEOR: SCORE: 1303.7

## 2021-03-16 DIAGNOSIS — E05.90 HYPERTHYROIDISM: Primary | ICD-10-CM

## 2021-03-16 LAB — TSI SER-ACNC: <1 TSI INDEX

## 2021-03-16 NOTE — ED NOTES
Pt presents to ED with concerns of dizziness that started this am. States she was working at her desk when she looked up and suddenly became dizzy. Has never had anything like this before. Diagnosed with hyperthyroidism recently, has elevated heart rate, not being treated for this yet. States she is not dizzy right now. Holding her head still seems to help the dizziness resolve.

## 2021-03-16 NOTE — ED PROVIDER NOTES
"  History     Chief Complaint   Patient presents with     Dizziness     dizziness started today at 1630 - while working at computer      HPI  Venessa Guillen is a 35 year old female with episodic dizziness which began when she looked up at her computer at ~ 4:15 PM while at work this afternoon. Mild vertiginous dizziness with slight head movement persists. No unusual headache, no tinnitus, no hearing loss or ear pain. No other visual or neurologic deficit or abnormality. No palpitations. Recent history remarkable for episodes of rapid palpitations with \"tachycardia\" x ~ 1 month with HR in the 110s with no syncope. She was found to have hyperthyroidism and has been followed by her PMD and Endocrinology. A Holter monitor study 3/8/21 showed:  Interpretation  1. Venessa Guillen was monitored for 24:00 hours. The overall quality of the tracing was good. The principle rhythm was Sinus. Dur ing this time her average  heart rate was 88 bpm. She had a minimum heart rate of 54 bpm at 05:58:13 (09-MAR) and a maximum hear t rate of 144 bpm at 06:38:00 (09-MAR).  There were zero pauses greater than 2.0 seconds. The NV interval measured .14-.18 secon ds, the QRS duration .09 seconds, the QT interval .27-.42  seconds.  2. Continuous episodes of Wenckebach noted from 23:10:00 (08-MAR) to 06:18:30 (09-MAR).  3. There were 90 isolated ventricular ectopics.  4. There was no supraventricular ectopy noted.  5. The patient logged 21 events during the recording period. Most of these events correlated with isolated PVCs. One event correlated with Wenckebach.      Allergies:  Allergies   Allergen Reactions     Shellfish Allergy Anaphylaxis and Hives     Ok with topical iodine     Ceclor [Cefaclor]        Problem List:    Patient Active Problem List    Diagnosis Date Noted     Wenckebach second degree AV block 03/12/2021     Priority: Medium     Holter, March, 2021.        Hyperthyroidism 03/12/2021     Priority: Medium     March, 2021. Seen by " Dr. Boone, Endocrinology.        Tachycardia 2021     Priority: Medium     Encounter for IUD insertion 12/15/2020     Priority: Medium     Placed 12/15/2020  Remove by 2025    NDC  64724-154-60    Lot   [id number]    Exp 2023            (spontaneous vaginal delivery) 2020     Priority: Medium     Multigravida of advanced maternal age in third trimester 10/26/2020     Priority: Medium     Prenatal care, subsequent pregnancy 04/10/2020     Priority: Medium     CARDIOVASCULAR SCREENING; LDL GOAL LESS THAN 160 10/31/2010     Priority: Medium        Past Medical History:    Past Medical History:   Diagnosis Date     Chickenpox        Past Surgical History:    Past Surgical History:   Procedure Laterality Date     BIOPSY OF SKIN LESION      mole removal     COLONOSCOPY      x2     MOUTH SURGERY      wisdom teeth       Family History:    Family History   Problem Relation Age of Onset     Substance Abuse Father         recovered A&D     Hypertension Maternal Grandmother      Prostate Cancer Maternal Grandfather      Skin Cancer Maternal Grandfather         BCC     Diabetes Maternal Grandfather      Heart Surgery Maternal Grandfather         bypass     Lung Cancer Paternal Grandmother      Diabetes Paternal Grandmother      Heart Surgery Paternal Grandmother         bypass     Prostate Cancer Paternal Grandfather        Social History:  Marital Status:   [2]  Social History     Tobacco Use     Smoking status: Never Smoker     Smokeless tobacco: Never Used   Substance Use Topics     Alcohol use: Yes     Comment: Minimal     Drug use: No        Medications:    acetaminophen (TYLENOL) 500 MG tablet  levonorgestrel (MIRENA) 20 MCG/24HR IUD  metoprolol tartrate (LOPRESSOR) 25 MG tablet  Misc. Devices (BREAST PUMP) MISC  Prenatal MV & Min w/FA-DHA (PRENATAL ADULT GUMMY/DHA/FA PO)        Review of Systems  As mentioned above in the history present illness.  All other systems were reviewed and are  "negative.    Physical Exam   BP: 116/65  Pulse: 85  Temp: 95.1  F (35.1  C)  Resp: 16  Height: 165.1 cm (5' 5\")  Weight: 60.8 kg (134 lb)  SpO2: 97 %      Physical Exam  Vitals signs and nursing note reviewed.   Constitutional:       General: She is not in acute distress.     Appearance: Normal appearance. She is well-developed. She is not ill-appearing or diaphoretic.   HENT:      Head: Normocephalic and atraumatic.      Right Ear: Tympanic membrane, ear canal and external ear normal.      Left Ear: Tympanic membrane, ear canal and external ear normal.      Ears:      Comments: Bilateral serous effusions.     Nose: Nose normal.      Mouth/Throat:      Mouth: Mucous membranes are moist.      Pharynx: Oropharynx is clear.   Eyes:      General: No scleral icterus.     Extraocular Movements: Extraocular movements intact.      Conjunctiva/sclera: Conjunctivae normal.      Pupils: Pupils are equal, round, and reactive to light.   Neck:      Musculoskeletal: Normal range of motion and neck supple.      Trachea: No tracheal deviation.   Cardiovascular:      Rate and Rhythm: Normal rate and regular rhythm.      Heart sounds: Normal heart sounds. No murmur. No friction rub. No gallop.    Pulmonary:      Effort: Pulmonary effort is normal. No respiratory distress.      Breath sounds: Normal breath sounds. No wheezing, rhonchi or rales.   Musculoskeletal: Normal range of motion.         General: No swelling or tenderness.      Right lower leg: No edema.      Left lower leg: No edema.   Skin:     General: Skin is warm and dry.      Coloration: Skin is not pale.      Findings: No erythema or rash.   Neurological:      General: No focal deficit present.      Mental Status: She is alert and oriented to person, place, and time.      Cranial Nerves: Cranial nerves are intact. No cranial nerve deficit ( 2-12 intact), dysarthria or facial asymmetry.      Sensory: No sensory deficit.      Motor: Motor function is intact. No weakness. "      Coordination: Coordination is intact. Coordination normal.      Gait: Gait is intact. Gait normal.   Psychiatric:         Mood and Affect: Mood normal.         Behavior: Behavior normal.         ED Course        Procedures               EKG Interpretation:      Interpreted by Cristino Hernandez MD  Time reviewed: upon completion  Symptoms at time of EKG: dizziness  Rhythm: normal sinus   Rate: normal  Axis: normal  Ectopy: none  Conduction: normal  ST Segments/ T Waves: No ST-T wave changes  Q Waves: none  Comparison to prior: No old EKG available  Clinical Impression: normal EKG         Results for orders placed or performed during the hospital encounter of 03/15/21 (from the past 24 hour(s))   MR Brain w/o & w Contrast    Narrative    MRI OF THE BRAIN WITHOUT AND WITH CONTRAST 3/15/2021 9:41 PM     COMPARISON: None    HISTORY:  Dizziness, non-specific    TECHNIQUE: Multi-sequence, multi-planar MRI images of the brain were  acquired before and after contrast (6ml gadavist).    FINDINGS:    There is no restricted diffusion to indicate acute or subacute  infarction..    The brain parenchymal volume and ventricular size are appropriate for  age. No secondary features of increased intraventricular or  intracranial pressure. No significant white matter changes.    No abnormal T2*susceptibility artifact or evidence for acute  intracranial hemorrhage. There is no evidence for mass, mass effect,  shift of midline or basal cistern effacement. The midline structures  and the posterior fossa structures are normal. The major intracranial  vascular flow voids are patent at the skull base.    There is no abnormal enhancement following contrast administration.    The paranasal sinuses are clear. The temporal bone structures are  clear.. No abnormal bone marrow signal. The skull base and cranium are  unremarkable.. The orbits are unremarkable.. Limited evaluation of the  upper cervical spine and maxillofacial structures is  "normal.      Impression    IMPRESSION:      1. No acute intracranial process..    HITESH ORNELAS MD       Medications   gadobutrol (GADAVIST) injection 6 mL (6 mLs Intravenous Given 3/15/21 2114)     Lab eval just performed 3 days ago and unremarkable, reviewed (CBC and CMP). Also had labs 4 days ago and 1 week ago). Lab eval today deferred. Will get an MRI brain eval.    Assessments & Plan (with Medical Decision Making)   35 year old female with episodic dizziness which began when she looked up at her computer at ~ 4:15 PM while at work this afternoon. Mild vertiginous dizziness with slight head movement persists. Normal exam. EKG today is normal. MRI brain without and with contrast was unremarkable. Recent lab eval (CBC and CMP) 3 days ago unremarkable. Probable benign peripheral vertigo, BPPV. Doubt central vertigo, TIA/CVA, seizures, PE/DVT or emergent disease process. She will use OTC antihistamine prn, follow-up with her PMD and I will refer her to Physical therapy for balance/vestiblar therapy. Recent history remarkable for rapid palpitations with \"tachycardia\" x ~ 1 month with HR in the 110s with no syncope. She was found to have hyperthyroidism and has been followed by her PMD and Endocrinology. A Holter monitor study 3/8/21 showed Wenkebach 2nd degree AV block. She logged 21 events during the recording period with most of these events correlating with isolated PVCs. One event correlated with Wenckebach 2nd degree AV block. Doubt dysrhythmia as a cause of her symptoms/dizziness today. I will refer her to Cardiology. She was provided instructions for supportive care and will return as needed for worsened condition or worsening symptoms, or new problems or concerns.      I have reviewed the nursing notes.    I have reviewed the findings, diagnosis, plan and need for follow up with the patient.    New Prescriptions    OTC antihistamine prn       Final diagnoses:   Peripheral vertigo, unspecified laterality "   Palpitations - recent Holter remarkable for Wenkebach 2nd degree AV block   Leekeopal second degree AV block       3/15/2021   [facility] EMERGENCY DEPT     Cristino Hernandez MD  03/16/21 0516

## 2021-03-19 ENCOUNTER — VIRTUAL VISIT (OUTPATIENT)
Dept: CARDIOLOGY | Facility: CLINIC | Age: 36
End: 2021-03-19
Attending: EMERGENCY MEDICINE
Payer: COMMERCIAL

## 2021-03-19 DIAGNOSIS — I44.1 WENCKEBACH SECOND DEGREE AV BLOCK: ICD-10-CM

## 2021-03-19 DIAGNOSIS — R00.2 PALPITATIONS: ICD-10-CM

## 2021-03-19 PROCEDURE — 99204 OFFICE O/P NEW MOD 45 MIN: CPT | Mod: 95 | Performed by: INTERNAL MEDICINE

## 2021-03-19 NOTE — PROGRESS NOTES
Venessa is a 35 year old who is being evaluated via a billable video visit.      How would you like to obtain your AVS? [employer]  If the video visit is dropped, the invitation should be resent by: Text to cell phone: [phone] using [email or web address]  Will anyone else be joining your video visit? No      Video Start Time: 2:27 PM  Video-Visit Details    Type of service:  Video Visit    Video End Time:3 PM    Originating Location (pt. Location): Home    Distant Location (provider location):  [facility] HEART [facility]     Platform used for Video Visit: Ramirez    This clinic visit was performed via telephone/video due to COVID-19 restrictions.    Today, I had the pleasure of connecting with Venessa Guillen for a follow-up visit.  She is a pleasant 35 year old patient with no significant past medical medical history presents to the clinic in consultation for recent ER visit for vertigo.  The patient reported lightheadedness and dizziness on looking up. Initial w/u in the ER was negative  and was not associated with any cardiac complaints.  She underwent MRI of the head which was negative.  Blood work was also unrevealing.  She did however report occasional palpitations for around a month for which a Holter monitor was prescribed. Holter monitor showed few PVCs during which she was occasionally symptomatic.  Also noted to have 2 episodes of Wenckebach which occured late at night and early in the morning. She tells me today that she was diagnosed with post partum thyroiditis which has been thought to be the reason for her palpitations. However, it is slowly improving and her resting HR has started to trend lower. She wonders If Wenckebach puts her at risk for other arrhythmias.   She otherwise denies chest pain, shortness breath, orthopnea, PND, presyncope or syncope.    Assessment:  1.  Palpitations in the setting of postpartum thyroiditis  2.  Occasional PVCs and 2 episodes of Wenckebach on Holter monitor- provided  reassurance     Plan:   1. Will perform TTE   2. Repeat 24 hr Holter in 6 months followed by a clinic visit  3. Hold off on starting meds at this time    Thank you for allowing me to participate in the care of Venessa Guillen    This note was completed in part using Dragon voice recognition software. Although reviewed after completion, some word and grammatical errors may occur.    Nik Pineda MD  Cardiology    No orders of the defined types were placed in this encounter.      No orders of the defined types were placed in this encounter.      Medications Discontinued During This Encounter   Medication Reason     metoprolol tartrate (LOPRESSOR) 25 MG tablet        Encounter Diagnoses   Name Primary?     Palpitations      Wenckebach second degree AV block        CURRENT MEDICATIONS:  Current Outpatient Medications   Medication Sig Dispense Refill     acetaminophen (TYLENOL) 500 MG tablet Take 2 tablets (1,000 mg) by mouth every 6 hours as needed for mild pain 100 tablet 0     levonorgestrel (MIRENA) 20 MCG/24HR IUD 1 each (20 mcg) by Intrauterine route once       Misc. Devices (BREAST PUMP) MISC 1 each daily 1 each 0     Prenatal MV & Min w/FA-DHA (PRENATAL ADULT GUMMY/DHA/FA PO) Take 2 chew tab by mouth daily          ALLERGIES     Allergies   Allergen Reactions     Shellfish Allergy Anaphylaxis and Hives     Ok with topical iodine     Ceclor [Cefaclor]        PAST MEDICAL HISTORY:  Past Medical History:   Diagnosis Date     Chickenpox        PAST SURGICAL HISTORY:  Past Surgical History:   Procedure Laterality Date     BIOPSY OF SKIN LESION      mole removal     COLONOSCOPY      x2     MOUTH SURGERY      wisdom teeth       FAMILY HISTORY:  Family History   Problem Relation Age of Onset     Substance Abuse Father         recovered A&D     Hypertension Maternal Grandmother      Prostate Cancer Maternal Grandfather      Skin Cancer Maternal Grandfather         BCC     Diabetes Maternal Grandfather      Heart Surgery  Maternal Grandfather         bypass     Lung Cancer Paternal Grandmother      Diabetes Paternal Grandmother      Heart Surgery Paternal Grandmother         bypass     Prostate Cancer Paternal Grandfather        SOCIAL HISTORY:  Social History     Socioeconomic History     Marital status:      Spouse name: Not on file     Number of children: Not on file     Years of education: Not on file     Highest education level: Not on file   Occupational History     Not on file   Social Needs     Financial resource strain: Not on file     Food insecurity     Worry: Not on file     Inability: Not on file     Transportation needs     Medical: Not on file     Non-medical: Not on file   Tobacco Use     Smoking status: Never Smoker     Smokeless tobacco: Never Used   Substance and Sexual Activity     Alcohol use: Yes     Comment: Minimal     Drug use: No     Sexual activity: Yes     Partners: Male     Birth control/protection: I.U.D.     Comment:  since 2016   Lifestyle     Physical activity     Days per week: Not on file     Minutes per session: Not on file     Stress: Not on file   Relationships     Social connections     Talks on phone: Not on file     Gets together: Not on file     Attends Zoroastrianism service: Not on file     Active member of club or organization: Not on file     Attends meetings of clubs or organizations: Not on file     Relationship status: Not on file     Intimate partner violence     Fear of current or ex partner: Not on file     Emotionally abused: Not on file     Physically abused: Not on file     Forced sexual activity: Not on file   Other Topics Concern     Parent/sibling w/ CABG, MI or angioplasty before 65F 55M? No   Social History Narrative     Not on file       General Appearance: No distress, normal body habitus, upright.  ENT/Mouth:  Normal head shape, no evidence of injury or laceration.  EYES: No scleral icterus, normal conjunctivae  Neck:  No evidence of thyromegaly  Chest/Lungs: No  audible wheezing. Non labored breathing. No cough.  Cardiovascular: No evidence of elevated jugular venous pressure.   Skin: No xanthelasma, normal skin collar. No evidence of facial lacerations.  Neurologic: No focal neurological defect. Normal speech.  Psychiatric: Alert and oriented x3

## 2021-03-19 NOTE — LETTER
3/19/2021    Physician No Ref-Primary  No address on file    RE: Venessa Guillen       Dear Colleague,    I had the pleasure of seeing Venessa Guillen in the [facility].    Venessa is a 35 year old who is being evaluated via a billable video visit.      How would you like to obtain your AVS? MyChart  If the video visit is dropped, the invitation should be resent by: Text to cell phone: [phone] using [employer]  Will anyone else be joining your video visit? No      Video Start Time: 2:27 PM  Video-Visit Details    Type of service:  Video Visit    Video End Time:3 PM    Originating Location (pt. Location): Home    Distant Location (provider location):  [facility] [place]     Platform used for Video Visit: [employer]    This clinic visit was performed via telephone/video due to COVID-[employer] restrictions.    Today, I had the pleasure of connecting with Venessa Guillen for a follow-up visit.  She is a pleasant 35 year old patient with no significant past medical medical history presents to the clinic in consultation for recent ER visit for vertigo.  The patient reported lightheadedness and dizziness on looking up. Initial w/u in the ER was negative  and was not associated with any cardiac complaints.  She underwent MRI of the head which was negative.  Blood work was also unrevealing.  She did however report occasional palpitations for around a month for which a Holter monitor was prescribed. Holter monitor showed few PVCs during which she was occasionally symptomatic.  Also noted to have 2 episodes of Wenckebach which occured late at night and early in the morning. She tells me today that she was diagnosed with post partum thyroiditis which has been thought to be the reason for her palpitations. However, it is slowly improving and her resting HR has started to trend lower. She wonders If Wenckebach puts her at risk for other arrhythmias.   She otherwise  denies chest pain, shortness breath, orthopnea, PND, presyncope or syncope.    Assessment:  1.  Palpitations in the setting of postpartum thyroiditis  2.  Occasional PVCs and 2 episodes of Wenckebach on Holter monitor- provided reassurance     Plan:   1. Will perform TTE   2. Repeat 24 hr Holter in 6 months followed by a clinic visit  3. Hold off on starting meds at this time    Thank you for allowing me to participate in the care of Venessa Guillen    This note was completed in part using Dragon voice recognition software. Although reviewed after completion, some word and grammatical errors may occur.    Nik Pineda MD  Cardiology    No orders of the defined types were placed in this encounter.      No orders of the defined types were placed in this encounter.      Medications Discontinued During This Encounter   Medication Reason     metoprolol tartrate (LOPRESSOR) 25 MG tablet        Encounter Diagnoses   Name Primary?     Palpitations      Wenckebach second degree AV block        CURRENT MEDICATIONS:  Current Outpatient Medications   Medication Sig Dispense Refill     acetaminophen (TYLENOL) 500 MG tablet Take 2 tablets (1,000 mg) by mouth every 6 hours as needed for mild pain 100 tablet 0     levonorgestrel (MIRENA) 20 MCG/24HR IUD 1 each (20 mcg) by Intrauterine route once       Misc. Devices (BREAST PUMP) MISC 1 each daily 1 each 0     Prenatal MV & Min w/FA-DHA (PRENATAL ADULT GUMMY/DHA/FA PO) Take 2 chew tab by mouth daily          ALLERGIES     Allergies   Allergen Reactions     Shellfish Allergy Anaphylaxis and Hives     Ok with topical iodine     Ceclor [Cefaclor]        PAST MEDICAL HISTORY:  Past Medical History:   Diagnosis Date     Chickenpox        PAST SURGICAL HISTORY:  Past Surgical History:   Procedure Laterality Date     BIOPSY OF SKIN LESION      mole removal     COLONOSCOPY      x2     MOUTH SURGERY      wisdom teeth       FAMILY HISTORY:  Family History   Problem Relation Age of Onset      Substance Abuse Father         recovered A&D     Hypertension Maternal Grandmother      Prostate Cancer Maternal Grandfather      Skin Cancer Maternal Grandfather         BCC     Diabetes Maternal Grandfather      Heart Surgery Maternal Grandfather         bypass     Lung Cancer Paternal Grandmother      Diabetes Paternal Grandmother      Heart Surgery Paternal Grandmother         bypass     Prostate Cancer Paternal Grandfather        SOCIAL HISTORY:  Social History     Socioeconomic History     Marital status:      Spouse name: Not on file     Number of children: Not on file     Years of education: Not on file     Highest education level: Not on file   Occupational History     Not on file   Social Needs     Financial resource strain: Not on file     Food insecurity     Worry: Not on file     Inability: Not on file     Transportation needs     Medical: Not on file     Non-medical: Not on file   Tobacco Use     Smoking status: Never Smoker     Smokeless tobacco: Never Used   Substance and Sexual Activity     Alcohol use: Yes     Comment: Minimal     Drug use: No     Sexual activity: Yes     Partners: Male     Birth control/protection: I.U.D.     Comment:  since 2016   Lifestyle     Physical activity     Days per week: Not on file     Minutes per session: Not on file     Stress: Not on file   Relationships     Social connections     Talks on phone: Not on file     Gets together: Not on file     Attends Sikhism service: Not on file     Active member of club or organization: Not on file     Attends meetings of clubs or organizations: Not on file     Relationship status: Not on file     Intimate partner violence     Fear of current or ex partner: Not on file     Emotionally abused: Not on file     Physically abused: Not on file     Forced sexual activity: Not on file   Other Topics Concern     Parent/sibling w/ CABG, MI or angioplasty before 65F 55M? No   Social History Narrative     Not on file        General Appearance: No distress, normal body habitus, upright.  ENT/Mouth:  Normal head shape, no evidence of injury or laceration.  EYES: No scleral icterus, normal conjunctivae  Neck:  No evidence of thyromegaly  Chest/Lungs: No audible wheezing. Non labored breathing. No cough.  Cardiovascular: No evidence of elevated jugular venous pressure.   Skin: No xanthelasma, normal skin collar. No evidence of facial lacerations.  Neurologic: No focal neurological defect. Normal speech.  Psychiatric: Alert and oriented x3    Thank you for allowing me to participate in the care of your patient.      Sincerely,     Nik Pineda MD     [facility]    cc:   Cristino Hernandez MD  [address] [place] [address]

## 2021-03-22 ENCOUNTER — OFFICE VISIT (OUTPATIENT)
Dept: DERMATOLOGY | Facility: CLINIC | Age: 36
End: 2021-03-22
Payer: COMMERCIAL

## 2021-03-22 VITALS — HEART RATE: 72 BPM | DIASTOLIC BLOOD PRESSURE: 68 MMHG | SYSTOLIC BLOOD PRESSURE: 110 MMHG | OXYGEN SATURATION: 98 %

## 2021-03-22 DIAGNOSIS — D22.9 NEVUS: Primary | ICD-10-CM

## 2021-03-22 DIAGNOSIS — L82.1 SEBORRHEIC KERATOSIS: ICD-10-CM

## 2021-03-22 DIAGNOSIS — L81.4 LENTIGO: ICD-10-CM

## 2021-03-22 DIAGNOSIS — D18.01 ANGIOMA OF SKIN: ICD-10-CM

## 2021-03-22 DIAGNOSIS — Z87.898 HISTORY OF ATYPICAL NEVUS: ICD-10-CM

## 2021-03-22 PROCEDURE — 99213 OFFICE O/P EST LOW 20 MIN: CPT | Performed by: PHYSICIAN ASSISTANT

## 2021-03-22 NOTE — LETTER
3/22/2021         RE: Venessa Guillen  [address]  [facility] [address]        Dear Colleague,    Thank you for referring your patient, Venessa Guillen, to the [facility]. Please see a copy of my visit note below.    HPI:   CC: Venessa Guillen is a 35 year old female who presents for Full skin cancer screening.  Last Skin Exam: 2 years ago      1st Baseline: no  Personal HX of Skin Cancer: no   Personal HX of Malignant Melanoma: no   Family HX of Skin Cancer / Malignant Melanoma: no  Personal HX of Atypical Moles:   Yes had a dysplastic nevus removed from the back 3 years ago; she thinks it was severely dysplastic. 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 Was on oncology unit prior to that. Has 2 children; 2.6 yo boy (Pacheco) and Mechelle (4 months)  On review of systems, there are no further skin complaints, patient is feeling otherwise well.  See patient intake sheet.  ROS of the following were done and are negative: Constitutional, Eyes, Ears, Nose,   Mouth, Throat, Cardiovascular, Respiratory, GI, Genitourinary, Musculoskeletal,   Psychiatric, Endocrine, Allergic/Immunologic.    PHYSICAL EXAM:   /68   Pulse 72   SpO2 98%   Skin exam performed as follows: Type 2 skin. Mood appropriate  Alert and Oriented X 3. Well developed, well nourished in no distress.  General appearance: Normal  Head including face: Normal  Eyes: conjunctiva and lids: Normal  Mouth: Lips, teeth, gums: Normal  Neck: Normal  Chest-breast/axillae: Normal  Back: Normal  Spleen and liver: Normal  Cardiovascular: Exam of peripheral vascular system by observation for swelling, varicosities, edema: Normal  Genitalia: groin, buttocks: Normal  Extremities: digits/nails (clubbing): Normal  Eccrine and Apocrine glands: Normal  Right upper extremity:  Normal  Left upper extremity: Normal  Right lower extremity: Normal  Left lower extremity: Normal  Skin: Scalp and body hair: See below    Pt deferred exam of breasts, groin, buttocks: No    Other physical findings:  1. Multiple pigmented macules on extremities and trunk  2. Multiple pigmented macules on face, trunk and extremities  3. Multiple vascular papules on trunk, arms and legs  4. Multiple scattered keratotic plaques         Except as noted above, no other signs of skin cancer or melanoma.     ASSESSMENT/PLAN:   Benign Full skin cancer screening today. . Patient with history of dysplastic nevi  Advised on monthly self exams and 1 year  Patient Education: Appropriate brochures given.    Multiple benign appearing nevi on arms, legs and trunk. Discussed ABCDEs of melanoma and sunscreen.   Multiple lentigos on arms, legs and trunk. Advised benign, no treatment needed.  Multiple scattered angiomas. Advised benign, no treatment needed.   Seborrheic keratosis on arms, legs and trunk. Advised benign, no treatment needed.        Follow-up: yearly FSE/PRN sooner    1.) Patient was asked about new and changing moles. YES  2.) Patient received a complete physical skin examination: YES  3.) Patient was counseled to perform a monthly self skin examination: YES  Scribed By: Brittany Hampton, MS, PAEvaC        Again, thank you for allowing me to participate in the care of your patient.        Sincerely,        Brittany Hampton PA-C

## 2021-03-22 NOTE — PROGRESS NOTES
HPI:   CC: Venessa Guillen is a 35 year old female who presents for Full skin cancer screening.  Last Skin Exam: 2 years ago      1st Baseline: no  Personal HX of Skin Cancer: no   Personal HX of Malignant Melanoma: no   Family HX of Skin Cancer / Malignant Melanoma: no  Personal HX of Atypical Moles:   Yes had a dysplastic nevus removed from the back 3 years ago; she thinks it was severely dysplastic. 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 Was on oncology unit prior to that. Has 2 children; 2.4 yo boy (Pacheco) and Mechelle (4 months)  On review of systems, there are no further skin complaints, patient is feeling otherwise well.  See patient intake sheet.  ROS of the following were done and are negative: Constitutional, Eyes, Ears, Nose,   Mouth, Throat, Cardiovascular, Respiratory, GI, Genitourinary, Musculoskeletal,   Psychiatric, Endocrine, Allergic/Immunologic.    PHYSICAL EXAM:   /68   Pulse 72   SpO2 98%   Skin exam performed as follows: Type 2 skin. Mood appropriate  Alert and Oriented X 3. Well developed, well nourished in no distress.  General appearance: Normal  Head including face: Normal  Eyes: conjunctiva and lids: Normal  Mouth: Lips, teeth, gums: Normal  Neck: Normal  Chest-breast/axillae: Normal  Back: Normal  Spleen and liver: Normal  Cardiovascular: Exam of peripheral vascular system by observation for swelling, varicosities, edema: Normal  Genitalia: groin, buttocks: Normal  Extremities: digits/nails (clubbing): Normal  Eccrine and Apocrine glands: Normal  Right upper extremity: Normal  Left upper extremity: Normal  Right lower extremity: Normal  Left lower extremity: Normal  Skin: Scalp and body hair: See below    Pt deferred exam of breasts, groin, buttocks: No    Other physical findings:  1. Multiple pigmented macules on  extremities and trunk  2. Multiple pigmented macules on face, trunk and extremities  3. Multiple vascular papules on trunk, arms and legs  4. Multiple scattered keratotic plaques         Except as noted above, no other signs of skin cancer or melanoma.     ASSESSMENT/PLAN:   Benign Full skin cancer screening today. . Patient with history of dysplastic nevi  Advised on monthly self exams and 1 year  Patient Education: Appropriate brochures given.    Multiple benign appearing nevi on arms, legs and trunk. Discussed ABCDEs of melanoma and sunscreen.   Multiple lentigos on arms, legs and trunk. Advised benign, no treatment needed.  Multiple scattered angiomas. Advised benign, no treatment needed.   Seborrheic keratosis on arms, legs and trunk. Advised benign, no treatment needed.        Follow-up: yearly FSE/PRN sooner    1.) Patient was asked about new and changing moles. YES  2.) Patient received a complete physical skin examination: YES  3.) Patient was counseled to perform a monthly self skin examination: YES  Scribed By: Brittany Hampton MS, PA-C

## 2021-03-29 ENCOUNTER — HOSPITAL ENCOUNTER (OUTPATIENT)
Dept: CARDIOLOGY | Facility: CLINIC | Age: 36
Discharge: HOME | End: 2021-03-29
Attending: INTERNAL MEDICINE | Admitting: INTERNAL MEDICINE
Payer: COMMERCIAL

## 2021-03-29 DIAGNOSIS — I44.1 WENCKEBACH SECOND DEGREE AV BLOCK: ICD-10-CM

## 2021-03-29 DIAGNOSIS — R00.2 PALPITATIONS: ICD-10-CM

## 2021-03-29 PROCEDURE — 93306 TTE W/DOPPLER COMPLETE: CPT

## 2021-03-29 PROCEDURE — 93306 TTE W/DOPPLER COMPLETE: CPT | Mod: 26 | Performed by: INTERNAL MEDICINE

## 2021-04-05 DIAGNOSIS — E05.90 HYPERTHYROIDISM: ICD-10-CM

## 2021-04-05 DIAGNOSIS — E05.90 HYPERTHYROIDISM: Primary | ICD-10-CM

## 2021-04-05 LAB
T4 FREE SERPL-MCNC: 0.75 NG/DL (ref 0.76–1.46)
TSH SERPL DL<=0.005 MIU/L-ACNC: 0.08 MU/L (ref 0.4–4)

## 2021-04-05 PROCEDURE — 36415 COLL VENOUS BLD VENIPUNCTURE: CPT | Performed by: INTERNAL MEDICINE

## 2021-04-05 PROCEDURE — 84439 ASSAY OF FREE THYROXINE: CPT | Performed by: INTERNAL MEDICINE

## 2021-04-05 PROCEDURE — 84443 ASSAY THYROID STIM HORMONE: CPT | Performed by: INTERNAL MEDICINE

## 2021-05-03 ENCOUNTER — HOSPITAL ENCOUNTER (OUTPATIENT)
Dept: LAB | Facility: CLINIC | Age: 36
Discharge: HOME | End: 2021-05-03
Attending: INTERNAL MEDICINE | Admitting: INTERNAL MEDICINE
Payer: COMMERCIAL

## 2021-05-03 DIAGNOSIS — E03.8 OTHER SPECIFIED HYPOTHYROIDISM: Primary | ICD-10-CM

## 2021-05-03 DIAGNOSIS — E05.90 HYPERTHYROIDISM: ICD-10-CM

## 2021-05-03 LAB
T4 FREE SERPL-MCNC: 0.72 NG/DL (ref 0.76–1.46)
TSH SERPL DL<=0.005 MIU/L-ACNC: 10.71 MU/L (ref 0.4–4)

## 2021-05-03 PROCEDURE — 84439 ASSAY OF FREE THYROXINE: CPT | Performed by: INTERNAL MEDICINE

## 2021-05-03 PROCEDURE — 36415 COLL VENOUS BLD VENIPUNCTURE: CPT | Performed by: INTERNAL MEDICINE

## 2021-05-03 PROCEDURE — 84443 ASSAY THYROID STIM HORMONE: CPT | Performed by: INTERNAL MEDICINE

## 2021-05-03 RX ORDER — LEVOTHYROXINE SODIUM 50 UG/1
50 TABLET ORAL DAILY
Qty: 30 TABLET | Refills: 11 | Status: SHIPPED | OUTPATIENT
Start: 2021-05-03 | End: 2022-04-05

## 2021-05-06 ENCOUNTER — E-VISIT (OUTPATIENT)
Dept: URGENT CARE | Facility: URGENT CARE | Age: 36
End: 2021-05-06
Payer: COMMERCIAL

## 2021-05-06 DIAGNOSIS — Z20.822 CLOSE EXPOSURE TO 2019 NOVEL CORONAVIRUS: Primary | ICD-10-CM

## 2021-05-06 DIAGNOSIS — Z20.822 CLOSE EXPOSURE TO 2019 NOVEL CORONAVIRUS: ICD-10-CM

## 2021-05-06 LAB
SARS-COV-2 RNA RESP QL NAA+PROBE: NORMAL
SPECIMEN SOURCE: NORMAL

## 2021-05-06 PROCEDURE — U0003 INFECTIOUS AGENT DETECTION BY NUCLEIC ACID (DNA OR RNA); SEVERE ACUTE RESPIRATORY SYNDROME CORONAVIRUS 2 (SARS-COV-2) (CORONAVIRUS DISEASE [COVID-19]), AMPLIFIED PROBE TECHNIQUE, MAKING USE OF HIGH THROUGHPUT TECHNOLOGIES AS DESCRIBED BY CMS-2020-01-R: HCPCS | Performed by: PHYSICIAN ASSISTANT

## 2021-05-06 PROCEDURE — U0005 INFEC AGEN DETEC AMPLI PROBE: HCPCS | Performed by: PHYSICIAN ASSISTANT

## 2021-05-06 PROCEDURE — 99421 OL DIG E/M SVC 5-10 MIN: CPT | Performed by: PHYSICIAN ASSISTANT

## 2021-05-07 LAB
LABORATORY COMMENT REPORT: NORMAL
SARS-COV-2 RNA RESP QL NAA+PROBE: NEGATIVE
SPECIMEN SOURCE: NORMAL

## 2021-06-03 DIAGNOSIS — E03.8 OTHER SPECIFIED HYPOTHYROIDISM: Primary | ICD-10-CM

## 2021-06-03 DIAGNOSIS — E03.8 OTHER SPECIFIED HYPOTHYROIDISM: ICD-10-CM

## 2021-06-03 LAB — TSH SERPL DL<=0.005 MIU/L-ACNC: 3.52 MU/L (ref 0.4–4)

## 2021-06-03 PROCEDURE — 36415 COLL VENOUS BLD VENIPUNCTURE: CPT | Performed by: INTERNAL MEDICINE

## 2021-06-03 PROCEDURE — 84443 ASSAY THYROID STIM HORMONE: CPT | Performed by: INTERNAL MEDICINE

## 2021-06-29 NOTE — PATIENT INSTRUCTIONS
"  Dear Venessa Guillen,    Based on your exposure to COVID-19 (coronavirus), we would like to test you for this virus. I have placed an order for this test. The best time for testing is 5-7 days after the exposure.    How to schedule:  For all employees or close contacts (except [employer] and [employer] - see below), go to your [employer] home page and scroll down to the section that says  You have an appointment that needs to be scheduled  and click the large green button that says  Schedule Now  and follow the steps to find the next available opening.     If you are unable to complete these steps or if you cannot find any available times, please call [phone] to schedule employee testing.     [employer] employees or close contacts, please call [phone].   [place] ([employer]) employees or close contacts call [phone].    Return to work/school/ guidance:   For people with high risk exposures outside the home    Please let your workplace manager and staffing office know when your quarantine ends.     We can not give you an exact date as it depends on the information below. You can calculate this on your own or work with your manager/staffing office to calculate this. (For example if you were exposed on 10/4, you would have to quarantine for 14 full days. That would be through 10/18. You could return on 10/19.)    Quarantine Guidelines:  Patients (\"contacts\") who have been in close prolonged contact of an infected person(s) (within six feet for at least 15 minutes within a 24 hour period), and remain asymptomatic should enter quarantine based on the following options:    14-day quarantine period (this remains the CDC recommendation for the greatest protection against spread of COVID-19) OR    Minimum 7-day quarantine with negative RT-PCR test collected on day 5 or later OR    10-day quarantine with no test  Quarantine Guideline exceptions are as follows:    People who have been fully vaccinated do not need " to quarantine if the exposure was at least 2 weeks after the last vaccination. This includes vaccinated health care workers.    Not fully vaccinated and unvaccinated Individuals who work in health care, congregate care, or congregate living should be off work for 14 days from their last date of exposure. Community activities for this group can be resumed based on options above. Fully vaccinated individuals in this group do not need to quarantine from work after exposure.    Not fully vaccinated and unvaccinated people whose high-risk exposure was a household member should always quarantine for 14 days from their last date of exposure. Fully vaccinated people in this category do not need to quarantine.    Not fully vaccinated or unvaccinated residents of congregate care and congregate living settings should always quarantine for 14 days from their last date of exposure. Fully vaccinated residents do not need to quarantine.    Note: If you have ongoing exposure to the covid positive person, this quarantine period may be more than 14 days. (For example, if you are continued to be exposed to your child who tested positive and cannot isolate from them, then the quarantine of 7-14 days can't start until your child is no longer contagious. This is typically 10 days from onset of the child's symptoms. So the total duration may be 17-24 days in this case.)    You should continue symptom monitoring until day 14 post-exposure. If you develop signs or symptoms of COVID-19, isolate and get tested (even if you have been tested already).    How to quarantine:   Stay home and away from others. Don't go to school or anywhere else. Generally quarantine means staying home from work but there are some exceptions to this. Please contact your workplace.  No hugging, kissing or shaking hands.  Don't let anyone visit.  Cover your mouth and nose with a mask, tissue or washcloth to avoid spreading germs.  Wash your hands and face often. Use  soap and water.    What are the symptoms of COVID-19?  The most common symptoms are cough, fever and trouble breathing. Less common symptoms include headache, body aches, fatigue (feeling very tired), chills, sore throat, stuffy or runny nose, diarrhea (loose poop), loss of taste or smell, belly pain, and nausea or vomiting (feeling sick to your stomach or throwing up).  After 14 days, if you have still don't have symptoms, you likely don't have this virus.  If you develop symptoms, follow these guidelines.  If you're normally healthy: Please start another eVisit.  If you have a serious health problem (like cancer, heart failure, an organ transplant or kidney disease): Call your specialty clinic. Let them know that you might have COVID-19.    Where can I get more information?   [facility] - About COVID-19: [email or web address]  CDC - What to Do If You're Sick: www.cdc.gov/coronavirus/2019-ncov/about/steps-when-sick.html  CDC - Ending Home Isolation: www.cdc.gov/coronavirus/2019-ncov/hcp/disposition-in-home-patients.html  CDC - Caring for Someone: www.cdc.gov/coronavirus/2019-ncov/if-you-are-sick/care-for-someone.html  [facility] clinical trials (COVID-19 research studies): clinicalaffairs.[facility]-clinical-trials  Below are the COVID-19 hotlines at the [facility] ([facility]). Interpreters are available.  For health questions: Call 752-322-5817 or 1-546.996.2110 (7 a.m. to 7 p.m.)  For questions about schools and childcare: Call 156-435-3743 or 1-340.519.5704 (7 a.m. to 7 p.m.)       Rachel

## 2021-08-30 ENCOUNTER — LAB (OUTPATIENT)
Dept: LAB | Facility: CLINIC | Age: 36
End: 2021-08-30
Payer: COMMERCIAL

## 2021-08-30 DIAGNOSIS — E03.8 OTHER SPECIFIED HYPOTHYROIDISM: ICD-10-CM

## 2021-08-30 LAB — TSH SERPL DL<=0.005 MIU/L-ACNC: 1.17 MU/L (ref 0.4–4)

## 2021-08-30 PROCEDURE — 84443 ASSAY THYROID STIM HORMONE: CPT

## 2021-08-30 PROCEDURE — 36415 COLL VENOUS BLD VENIPUNCTURE: CPT

## 2021-08-30 NOTE — LETTER
August 31, 2021      Venessa Guillen  [address]  [place] [address]        Dear ,    We are writing to inform you of your test results.    TSH normal.   No change to medication.   Recommend follow up to check in.       Resulted Orders   TSH with free T4 reflex   Result Value Ref [facility]    TSH 1.17 0.40 - 4.00 mU/L       If you have any questions or concerns, please call the clinic at the number listed above.       Sincerely,      Zachery Boone MD

## 2021-09-09 ENCOUNTER — LAB (OUTPATIENT)
Dept: LAB | Facility: CLINIC | Age: 36
End: 2021-09-09
Payer: COMMERCIAL

## 2021-09-09 DIAGNOSIS — Z20.822 ENCOUNTER FOR LABORATORY TESTING FOR COVID-19 VIRUS: ICD-10-CM

## 2021-09-09 PROCEDURE — 86769 SARS-COV-2 COVID-19 ANTIBODY: CPT | Mod: 90

## 2021-09-09 PROCEDURE — 86769 SARS-COV-2 COVID-19 ANTIBODY: CPT | Mod: 59

## 2021-09-09 PROCEDURE — 99000 SPECIMEN HANDLING OFFICE-LAB: CPT

## 2021-09-09 PROCEDURE — 36415 COLL VENOUS BLD VENIPUNCTURE: CPT

## 2021-09-11 ENCOUNTER — E-VISIT (OUTPATIENT)
Dept: URGENT CARE | Facility: URGENT CARE | Age: 36
End: 2021-09-11
Payer: COMMERCIAL

## 2021-09-11 DIAGNOSIS — Z20.822 SUSPECTED COVID-19 VIRUS INFECTION: Primary | ICD-10-CM

## 2021-09-11 LAB
SARS-COV-2 AB SERPL IA-ACNC: >250 U/ML
SARS-COV-2 AB SERPL QL IA: NEGATIVE
SARS-COV-2 AB SERPL QL IA: POSITIVE

## 2021-09-11 PROCEDURE — 99421 OL DIG E/M SVC 5-10 MIN: CPT | Performed by: PREVENTIVE MEDICINE

## 2021-09-11 NOTE — PATIENT INSTRUCTIONS
Dear Venessa Guillen,    Your symptoms show that you may have coronavirus (COVID-19). This illness can cause fever, cough and trouble breathing. Many people get a mild case and get better on their own. Some people can get very sick.    Will I be tested for COVID-19?  We would like to test you for Covid-19 virus. I have placed orders for this test.     For all employees or close contacts (except [employer] and [employer] - see below), go to your [facility] home page and scroll down to the section that says  You have an appointment that needs to be scheduled  and click the large green button that says  Schedule Now  and follow the steps to find the next available opening.     If you are unable to complete these steps or if you cannot find any available times, please call [phone] to schedule employee testing.     [employer] employees or close contacts, please call [phone].   [place] ([employer]) employees or close contacts call [phone].    Return to work/school/ guidance:  Please let your workplace manager and staffing office know when your quarantine ends     We can t give you an exact date as it depends on the above. You can calculate this on your own or work with your manager/staffing office to calculate this. (For example if you were exposed on 10/4, you would have to quarantine for 14 full days. That would be through 10/18. You could return on 10/19.)      If you receive a positive COVID-19 test result, follow the guidance of the those who are giving you the results. Usually the return to work is 10 (or in some cases 20 days from symptom onset.) If you work at [employer] [place], you must also be cleared by Employee Occupational Health and Safety to return to work.        If you receive a negative COVID-19 test result and did not have a high risk exposure to someone with a known positive COVID-19 test, you can return to work once you're free of fever for 24 hours without fever-reducing medication and  your symptoms are improving or resolved.      If you receive a negative COVID-19 test and If you had a high risk exposure to someone who has tested positive for COVID-19 then you can return to work 14 days after your last contact with the positive individual    Note: If you have ongoing exposure to the covid positive person, this quarantine period may be more than 14 days. (For example, if you are continued to be exposed to your child who tested positive and cannot isolate from them, then the quarantine of 7-14 days can't start until your child is no longer contagious. This is typically 10 days from onset of the child's symptoms. So the total duration may be 17-24 days in this case.)    Sign up for [employer].   We know it's scary to hear that you might have COVID-19. We want to track your symptoms to make sure you're okay over the next 2 weeks. Please look for an email from [employer]--this is a free, online program that we'll use to keep in touch. To sign up, follow the link in the email you will receive. Learn more at [email or web address]    How can I take care of myself?    Get lots of rest. Drink extra fluids (unless a doctor has told you not to)    Take Tylenol (acetaminophen) or ibuprofen for fever or pain. If you have liver or kidney problems, ask your family doctor if it's okay to take Tylenol o ibuprofen    If you have other health problems (like cancer, heart failure, an organ transplant or severe kidney disease): Call your specialty clinic if you don't feel better in the next 2 days.    Know when to call 911. Emergency warning signs include:  o Trouble breathing or shortness of breath  o Pain or pressure in the chest that doesn't go away  o Feeling confused like you haven't felt before, or not being able to wake up  o Bluish-colored lips or face    Where can I get more information?   [facility] - About COVID-19:   [email or web address]    CDC - What to Do If You're Sick:    www.cdc.gov/coronavirus/2019-ncov/about/steps-when-sick.html

## 2021-09-12 ENCOUNTER — LAB (OUTPATIENT)
Dept: FAMILY MEDICINE | Facility: CLINIC | Age: 36
End: 2021-09-12
Attending: PREVENTIVE MEDICINE
Payer: COMMERCIAL

## 2021-09-12 DIAGNOSIS — Z20.822 SUSPECTED COVID-19 VIRUS INFECTION: ICD-10-CM

## 2021-09-12 LAB — SARS-COV-2 RNA RESP QL NAA+PROBE: NEGATIVE

## 2021-09-12 PROCEDURE — U0005 INFEC AGEN DETEC AMPLI PROBE: HCPCS

## 2021-09-12 PROCEDURE — U0003 INFECTIOUS AGENT DETECTION BY NUCLEIC ACID (DNA OR RNA); SEVERE ACUTE RESPIRATORY SYNDROME CORONAVIRUS 2 (SARS-COV-2) (CORONAVIRUS DISEASE [COVID-19]), AMPLIFIED PROBE TECHNIQUE, MAKING USE OF HIGH THROUGHPUT TECHNOLOGIES AS DESCRIBED BY CMS-2020-01-R: HCPCS

## 2021-09-20 ENCOUNTER — HOSPITAL ENCOUNTER (OUTPATIENT)
Dept: CARDIOLOGY | Facility: CLINIC | Age: 36
Discharge: HOME | End: 2021-09-20
Attending: INTERNAL MEDICINE | Admitting: INTERNAL MEDICINE
Payer: COMMERCIAL

## 2021-09-20 DIAGNOSIS — I44.1 WENCKEBACH SECOND DEGREE AV BLOCK: ICD-10-CM

## 2021-09-20 DIAGNOSIS — R00.2 PALPITATIONS: ICD-10-CM

## 2021-09-20 PROCEDURE — 93225 XTRNL ECG REC<48 HRS REC: CPT

## 2021-09-20 PROCEDURE — 93227 XTRNL ECG REC<48 HR R&I: CPT | Performed by: INTERNAL MEDICINE

## 2021-09-28 ENCOUNTER — E-VISIT (OUTPATIENT)
Dept: URGENT CARE | Facility: CLINIC | Age: 36
End: 2021-09-28
Payer: COMMERCIAL

## 2021-09-28 DIAGNOSIS — Z20.822 CLOSE EXPOSURE TO 2019 NOVEL CORONAVIRUS: Primary | ICD-10-CM

## 2021-09-28 PROCEDURE — 99421 OL DIG E/M SVC 5-10 MIN: CPT | Performed by: EMERGENCY MEDICINE

## 2021-09-28 NOTE — PATIENT INSTRUCTIONS
"  Dear Venessa Guillen,    Based on your exposure to COVID-19 (coronavirus), we would like to test you for this virus. I have placed an order for this test. The best time for testing is 5-7 days after the exposure.    How to schedule:  For all employees or close contacts (except [employer] and [employer] - see below), go to your [employer] home page and scroll down to the section that says  You have an appointment that needs to be scheduled  and click the large green button that says  Schedule Now  and follow the steps to find the next available opening.     If you are unable to complete these steps or if you cannot find any available times, please call [phone] to schedule employee testing.     [employer] employees or close contacts, please call [phone].   [employer] ([employer]) employees or close contacts call [phone].    Return to work/school/ guidance:   For people with high risk exposures outside the home    Please let your workplace manager and staffing office know when your quarantine ends.     We can not give you an exact date as it depends on the information below. You can calculate this on your own or work with your manager/staffing office to calculate this. (For example if you were exposed on 10/4, you would have to quarantine for 14 full days. That would be through 10/18. You could return on 10/19.)    Quarantine Guidelines:  Patients (\"contacts\") who have been in close prolonged contact of an infected person(s) (within six feet for at least 15 minutes within a 24 hour period), and remain asymptomatic should enter quarantine based on the following options:    14-day quarantine period (this remains the CDC recommendation for the greatest protection against spread of COVID-19) OR    Minimum 7-day quarantine with negative RT-PCR test collected on day 5 or later OR    10-day quarantine with no test  Quarantine Guideline exceptions are as follows:    People who have been fully vaccinated do not need " to quarantine if the exposure was at least 2 weeks after the last vaccination. This includes vaccinated health care workers.    Not fully vaccinated and unvaccinated Individuals who work in health care, congregate care, or congregate living should be off work for 14 days from their last date of exposure. Community activities for this group can be resumed based on options above. Fully vaccinated individuals in this group do not need to quarantine from work after exposure.    Not fully vaccinated and unvaccinated people whose high-risk exposure was a household member should always quarantine for 14 days from their last date of exposure. Fully vaccinated people in this category do not need to quarantine.    Not fully vaccinated or unvaccinated residents of congregate care and congregate living settings should always quarantine for 14 days from their last date of exposure. Fully vaccinated residents do not need to quarantine.    Note: If you have ongoing exposure to the covid positive person, this quarantine period may be more than 14 days. (For example, if you are continued to be exposed to your child who tested positive and cannot isolate from them, then the quarantine of 7-14 days can't start until your child is no longer contagious. This is typically 10 days from onset of the child's symptoms. So the total duration may be 17-24 days in this case.)    You should continue symptom monitoring until day 14 post-exposure. If you develop signs or symptoms of COVID-19, isolate and get tested (even if you have been tested already).    How to quarantine:   Stay home and away from others. Don't go to school or anywhere else. Generally quarantine means staying home from work but there are some exceptions to this. Please contact your workplace.  No hugging, kissing or shaking hands.  Don't let anyone visit.  Cover your mouth and nose with a mask, tissue or washcloth to avoid spreading germs.  Wash your hands and face often. Use  soap and water.    What are the symptoms of COVID-19?  The most common symptoms are cough, fever and trouble breathing. Less common symptoms include headache, body aches, fatigue (feeling very tired), chills, sore throat, stuffy or runny nose, diarrhea (loose poop), loss of taste or smell, belly pain, and nausea or vomiting (feeling sick to your stomach or throwing up).  After 14 days, if you have still don't have symptoms, you likely don't have this virus.  If you develop symptoms, follow these guidelines.  If you're normally healthy: Please start another eVisit.  If you have a serious health problem (like cancer, heart failure, an organ transplant or kidney disease): Call your specialty clinic. Let them know that you might have COVID-19.    Where can I get more information?   [employer] [facility] - About COVID-19: [email or web address]  CDC - What to Do If You're Sick: www.cdc.gov/coronavirus/2019-ncov/about/steps-when-sick.html  CDC - Ending Home Isolation: www.cdc.gov/coronavirus/2019-ncov/hcp/disposition-in-home-patients.html  CDC - Caring for Someone: www.cdc.gov/coronavirus/2019-ncov/if-you-are-sick/care-for-someone.html  [facility] clinical trials (COVID-19 research studies): clinicalaffairs.[facility]-clinical-trials  Below are the COVID-19 hotlines at the [facility] ([facility]). Interpreters are available.  For health questions: Call 918-840-9505 or 1-642.417.7851 (7 a.m. to 7 p.m.)  For questions about schools and childcare: Call 508-294-3944 or 1-584.817.5710 (7 a.m. to 7 p.m.)      September 28, 2021  RE:  Venessa Guillen                                                                                                                   [address]  [facility] [address]      To whom it may concern:    I evaluated Venessa Guillen on September 28, 2021. Venessa Guillen should be excused from work/school.    They should let their workplace manager and staffing office know  "when their quarantine ends.    We can not give an exact date as it depends on the information below. They can calculate this on their own or work with their manager/staffing office to calculate this. (For example if they were exposed on 10/04, they would have to quarantine for 14 full days. That would be through 10/18. They could return on 10/19.)    Quarantine Guidelines:    Patients (\"contacts\") who have been in close prolonged contact of an infected person(s) (within six feet for at least 15 minutes within a 24 hour period) and remain asymptomatic should enter quarantine based on the following options:      14-day quarantine period (this remains the CDC recommendation for the greatest protection against spread of COVID-19) OR    Minimum 7-day quarantine with negative RT-PCR test collected on day 5 or later OR    10-day quarantine with no test   Quarantine Guideline exceptions are as follows:    People who have been fully vaccinated do not need to quarantine if the exposure was at least 2 weeks after the last vaccination. This includes vaccinated health care workers.    Not fully vaccinated and unvaccinated Individuals who work in health care, congregate care, or congregate living should be off work for 14 days from their last date of exposure. Community activities for this group can be resumed based on options above. Fully vaccinated individuals in this group do not need to quarantine from work after exposure.    Not fully vaccinated and unvaccinated people whose high-risk exposure was a household member should always quarantine for 14 days from their last date of exposure. Fully vaccinated people in this category do not need to quarantine.    Not fully vaccinated or unvaccinated residents of congregate care and congregate living settings should always quarantine for 14 days from their last date of exposure. Fully vaccinated residents do not need to quarantine.    Note: If there is ongoing exposure to the covid " positive person, this quarantine period may be longer than 14 days. (For example, if they are continually exposed to their child, who tested positive and cannot isolate from them, then the quarantine of 7-14 days can't start until their child is no longer contagious. This is typically 10 days from onset to the child's symptoms. So the total duration may be 17-24 days in this case.)    Venessa Guillen should continue symptom monitoring until day 14 post-exposure. If they develop signs or symptoms of COVID-19, they should isolate and get tested (even if they have been tested already).    Sincerely,  Wilian Ly MD

## 2021-09-29 ENCOUNTER — VIRTUAL VISIT (OUTPATIENT)
Dept: CARDIOLOGY | Facility: CLINIC | Age: 36
End: 2021-09-29
Attending: INTERNAL MEDICINE
Payer: COMMERCIAL

## 2021-09-29 DIAGNOSIS — I44.1 WENCKEBACH SECOND DEGREE AV BLOCK: ICD-10-CM

## 2021-09-29 DIAGNOSIS — R00.2 PALPITATIONS: ICD-10-CM

## 2021-09-29 PROCEDURE — 99214 OFFICE O/P EST MOD 30 MIN: CPT | Mod: 95 | Performed by: INTERNAL MEDICINE

## 2021-09-29 NOTE — LETTER
9/29/2021    Physician No Ref-Primary  No address on file    RE: Venessa Guillen       Dear Colleague,    I had the pleasure of seeing Venessa Guillen in the [facility].    Venessa is a 36 year old who is being evaluated via a billable video visit.      How would you like to obtain your AVS? MyChart  If the video visit is dropped, the invitation should be resent by: Text to cell phone: [phone]  Will anyone else be joining your video visit? No      Video Start Time: 10:29 AM  Video-Visit Details    Type of service:  Video Visit    Video End Time:10:49 AM    Originating Location (pt. Location): Home    Distant Location (provider location):  [facility] [place]     Platform used for Video Visit: [employer]     This clinic visit was performed via telephone/video due to COVID-19 restrictions.    Today, I had the pleasure of connecting with Venessa Guillen for a follow-up visit.  She is a pleasant 36 year old patient who presents to the clinic for follow-up visit.  I previously seen the patient 6 months ago in consultation for palpitations.  Rhythm monitor at that time it showed occasional PVCs and a few episodes of Mobitz type I AV block during sleep.  We decided to hold off on doing any medications and schedule her for a visit today.  She also underwent a Holter monitor prior to today's visit which shows few episodes of ectopic beats but no worrisome arrhythmias.  Occasional Mobitz type I AV block was again noted.  Today, she tells me that she has been feeling extremely well and does not have any complaints.  She occasionally feels palpitations which subside within a couple of seconds.  She otherwise denies chest pain, shortness of breath, chest tightness, lightheadedness, presyncope or syncope.      Assessment:  1.  Palpitations-secondary to rare PVCs  2.  Rare episode of Mobitz type I AV block during sleep  3.  Postpartum thyroiditis    The patient  is currently doing well and does not have any significant complaints.  I do not believe she needs to be on AV zonia blocking agents at this time as the episodes of ectopic beats are rare and she is only minimally symptomatic.  I will have her come back and see us in clinic in 2 years with repeat Holter monitor.  The lowest heart rate recorded on Holter was 44 and I told her that it is normal to have low heart rate during sleep and not to be worried about it.  We also talked about the fact that vagal tone is high during sleep and we often see bradycardia/pauses/benign form of AV block during sleep and it is quite possible that A-V block may never progressed to a more serious one.  She understands.    I spent 35 minutes taking care of the patient.  I reviewed her EKG, both rhythm monitors, echocardiogram, medications and blood work.    Plan:   1.  Follow-up with me in 2 years with repeat Holter monitor.    Thank you for allowing me to participate in the care of Venessa Guillen    This note was completed in part using Dragon voice recognition software. Although reviewed after completion, some word and grammatical errors may occur.    Nik Pineda MD  Cardiology    Orders Placed This Encounter   Procedures     Follow-Up with Cardiologist       No orders of the defined types were placed in this encounter.      There are no discontinued medications.    Encounter Diagnoses   Name Primary?     Palpitations      Wenckebach second degree AV block        CURRENT MEDICATIONS:  Current Outpatient Medications   Medication Sig Dispense Refill     acetaminophen (TYLENOL) 500 MG tablet Take 2 tablets (1,000 mg) by mouth every 6 hours as needed for mild pain 100 tablet 0     levonorgestrel (MIRENA) 20 MCG/24HR IUD 1 each (20 mcg) by Intrauterine route once       levothyroxine (SYNTHROID/LEVOTHROID) 50 MCG tablet Take 1 tablet (50 mcg) by mouth daily 30 tablet 11     Misc. Devices (BREAST PUMP) MISC 1 each daily 1 each 0     Prenatal  MV & Min w/FA-DHA (PRENATAL ADULT GUMMY/DHA/FA PO) Take 2 chew tab by mouth daily          ALLERGIES     Allergies   Allergen Reactions     Shellfish Allergy Anaphylaxis and Hives     Ok with topical iodine     Ceclor [Cefaclor]        PAST MEDICAL HISTORY:  Past Medical History:   Diagnosis Date     Chickenpox        PAST SURGICAL HISTORY:  Past Surgical History:   Procedure Laterality Date     BIOPSY OF SKIN LESION      mole removal     COLONOSCOPY      x2     MOUTH SURGERY      wisdom teeth       FAMILY HISTORY:  Family History   Problem Relation Age of Onset     Substance Abuse Father         recovered A&D     Hypertension Maternal Grandmother      Prostate Cancer Maternal Grandfather      Skin Cancer Maternal Grandfather         BCC     Diabetes Maternal Grandfather      Heart Surgery Maternal Grandfather         bypass     Lung Cancer Paternal Grandmother      Diabetes Paternal Grandmother      Heart Surgery Paternal Grandmother         bypass     Prostate Cancer Paternal Grandfather        SOCIAL HISTORY:  Social History     Socioeconomic History     Marital status:      Spouse name: None     Number of children: None     Years of education: None     Highest education level: None   Occupational History     None   Tobacco Use     Smoking status: Never Smoker     Smokeless tobacco: Never Used   Substance and Sexual Activity     Alcohol use: Yes     Comment: Minimal     Drug use: No     Sexual activity: Yes     Partners: Male     Birth control/protection: I.U.D.     Comment:  since 2016   Other Topics Concern     Parent/sibling w/ CABG, MI or angioplasty before 65F 55M? No   Social History Narrative     None     Social Determinants of Health     Financial Resource Strain:      Difficulty of Paying Living Expenses:    Food Insecurity:      Worried About Running Out of Food in the Last Year:      Ran Out of Food in the Last Year:    Transportation Needs:      Lack of Transportation (Medical):       Lack of Transportation (Non-Medical):    Physical Activity:      Days of Exercise per Week:      Minutes of Exercise per Session:    Stress:      Feeling of Stress :    Social Connections:      Frequency of Communication with Friends and Family:      Frequency of Social Gatherings with Friends and Family:      Attends [employer] Services:      Active Member of Clubs or Organizations:      Attends Club or Organization Meetings:      Marital Status:    Intimate Partner Violence:      Fear of Current or Ex-Partner:      Emotionally Abused:      Physically Abused:      Sexually Abused:        General Appearance: No distress, normal body habitus, upright.  ENT/Mouth:  Normal head shape, no evidence of injury or laceration.  EYES: No scleral icterus, normal conjunctivae  Neck:  No evidence of thyromegaly  Chest/Lungs: No audible wheezing. Non labored breathing. No cough.  Cardiovascular: No evidence of elevated jugular venous pressure.   Skin: No xanthelasma, normal skin collar. No evidence of facial lacerations.  Neurologic: No focal neurological defect. Normal speech.  Psychiatric: Alert and oriented x3        Thank you for allowing me to participate in the care of your patient.      Sincerely,     Nik Pineda MD     [facility]  cc:   Nik Pineda MD  [address]  [place] [address]

## 2021-09-29 NOTE — PROGRESS NOTES
Venessa is a 36 year old who is being evaluated via a billable video visit.      How would you like to obtain your AVS? MyChart  If the video visit is dropped, the invitation should be resent by: Text to cell phone: [phone]  Will anyone else be joining your video visit? No      Video Start Time: 10:29 AM  Video-Visit Details    Type of service:  Video Visit    Video End Time:10:49 AM    Originating Location (pt. Location): Home    Distant Location (provider location):  [facility]     Platform used for Video Visit: [employer]     This clinic visit was performed via telephone/video due to COVID-19 restrictions.    Today, I had the pleasure of connecting with Venessa Guillen for a follow-up visit.  She is a pleasant 36 year old patient who presents to the clinic for follow-up visit.  I previously seen the patient 6 months ago in consultation for palpitations.  Rhythm monitor at that time it showed occasional PVCs and a few episodes of Mobitz type I AV block during sleep.  We decided to hold off on doing any medications and schedule her for a visit today.  She also underwent a Holter monitor prior to today's visit which shows few episodes of ectopic beats but no worrisome arrhythmias.  Occasional Mobitz type I AV block was again noted.  Today, she tells me that she has been feeling extremely well and does not have any complaints.  She occasionally feels palpitations which subside within a couple of seconds.  She otherwise denies chest pain, shortness of breath, chest tightness, lightheadedness, presyncope or syncope.      Assessment:  1.  Palpitations-secondary to rare PVCs  2.  Rare episode of Mobitz type I AV block during sleep  3.  Postpartum thyroiditis    The patient is currently doing well and does not have any significant complaints.  I do not believe she needs to be on AV zonia blocking agents at this time as the episodes of ectopic beats are rare and she is only minimally symptomatic.   I will have her come back and see us in clinic in 2 years with repeat Holter monitor.  The lowest heart rate recorded on Holter was 44 and I told her that it is normal to have low heart rate during sleep and not to be worried about it.  We also talked about the fact that vagal tone is high during sleep and we often see bradycardia/pauses/benign form of AV block during sleep and it is quite possible that A-V block may never progressed to a more serious one.  She understands.    I spent 35 minutes taking care of the patient.  I reviewed her EKG, both rhythm monitors, echocardiogram, medications and blood work.    Plan:   1.  Follow-up with me in 2 years with repeat Holter monitor.    Thank you for allowing me to participate in the care of Venessa Guillen    This note was completed in part using Dragon voice recognition software. Although reviewed after completion, some word and grammatical errors may occur.    Nik Pineda MD  Cardiology    Orders Placed This Encounter   Procedures     Follow-Up with Cardiologist       No orders of the defined types were placed in this encounter.      There are no discontinued medications.    Encounter Diagnoses   Name Primary?     Palpitations      Wenckebach second degree AV block        CURRENT MEDICATIONS:  Current Outpatient Medications   Medication Sig Dispense Refill     acetaminophen (TYLENOL) 500 MG tablet Take 2 tablets (1,000 mg) by mouth every 6 hours as needed for mild pain 100 tablet 0     levonorgestrel (MIRENA) 20 MCG/24HR IUD 1 each (20 mcg) by Intrauterine route once       levothyroxine (SYNTHROID/LEVOTHROID) 50 MCG tablet Take 1 tablet (50 mcg) by mouth daily 30 tablet 11     Misc. Devices (BREAST PUMP) MISC 1 each daily 1 each 0     Prenatal MV & Min w/FA-DHA (PRENATAL ADULT GUMMY/DHA/FA PO) Take 2 chew tab by mouth daily          ALLERGIES     Allergies   Allergen Reactions     Shellfish Allergy Anaphylaxis and Hives     Ok with topical iodine     Ceclor  [Cefaclor]        PAST MEDICAL HISTORY:  Past Medical History:   Diagnosis Date     Chickenpox        PAST SURGICAL HISTORY:  Past Surgical History:   Procedure Laterality Date     BIOPSY OF SKIN LESION      mole removal     COLONOSCOPY      x2     MOUTH SURGERY      wisdom teeth       FAMILY HISTORY:  Family History   Problem Relation Age of Onset     Substance Abuse Father         recovered A&D     Hypertension Maternal Grandmother      Prostate Cancer Maternal Grandfather      Skin Cancer Maternal Grandfather         BCC     Diabetes Maternal Grandfather      Heart Surgery Maternal Grandfather         bypass     Lung Cancer Paternal Grandmother      Diabetes Paternal Grandmother      Heart Surgery Paternal Grandmother         bypass     Prostate Cancer Paternal Grandfather        SOCIAL HISTORY:  Social History     Socioeconomic History     Marital status:      Spouse name: None     Number of children: None     Years of education: None     Highest education level: None   Occupational History     None   Tobacco Use     Smoking status: Never Smoker     Smokeless tobacco: Never Used   Substance and Sexual Activity     Alcohol use: Yes     Comment: Minimal     Drug use: No     Sexual activity: Yes     Partners: Male     Birth control/protection: I.U.D.     Comment:  since 2016   Other Topics Concern     Parent/sibling w/ CABG, MI or angioplasty before 65F 55M? No   Social History Narrative     None     Social Determinants of Health     Financial Resource Strain:      Difficulty of Paying Living Expenses:    Food Insecurity:      Worried About Running Out of Food in the Last Year:      Ran Out of Food in the Last Year:    Transportation Needs:      Lack of Transportation (Medical):      Lack of Transportation (Non-Medical):    Physical Activity:      Days of Exercise per Week:      Minutes of Exercise per Session:    Stress:      Feeling of Stress :    Social Connections:      Frequency of Communication  with Friends and Family:      Frequency of Social Gatherings with Friends and Family:      Attends Spiritism Services:      Active Member of Clubs or Organizations:      Attends Club or Organization Meetings:      Marital Status:    Intimate Partner Violence:      Fear of Current or Ex-Partner:      Emotionally Abused:      Physically Abused:      Sexually Abused:        General Appearance: No distress, normal body habitus, upright.  ENT/Mouth:  Normal head shape, no evidence of injury or laceration.  EYES: No scleral icterus, normal conjunctivae  Neck:  No evidence of thyromegaly  Chest/Lungs: No audible wheezing. Non labored breathing. No cough.  Cardiovascular: No evidence of elevated jugular venous pressure.   Skin: No xanthelasma, normal skin collar. No evidence of facial lacerations.  Neurologic: No focal neurological defect. Normal speech.  Psychiatric: Alert and oriented x3

## 2021-09-30 ENCOUNTER — LAB (OUTPATIENT)
Dept: LAB | Facility: CLINIC | Age: 36
End: 2021-09-30
Attending: EMERGENCY MEDICINE
Payer: COMMERCIAL

## 2021-09-30 DIAGNOSIS — Z20.822 CLOSE EXPOSURE TO 2019 NOVEL CORONAVIRUS: ICD-10-CM

## 2021-09-30 LAB — SARS-COV-2 RNA RESP QL NAA+PROBE: NEGATIVE

## 2021-09-30 PROCEDURE — U0005 INFEC AGEN DETEC AMPLI PROBE: HCPCS

## 2021-09-30 PROCEDURE — U0003 INFECTIOUS AGENT DETECTION BY NUCLEIC ACID (DNA OR RNA); SEVERE ACUTE RESPIRATORY SYNDROME CORONAVIRUS 2 (SARS-COV-2) (CORONAVIRUS DISEASE [COVID-19]), AMPLIFIED PROBE TECHNIQUE, MAKING USE OF HIGH THROUGHPUT TECHNOLOGIES AS DESCRIBED BY CMS-2020-01-R: HCPCS

## 2021-10-13 ENCOUNTER — VIRTUAL VISIT (OUTPATIENT)
Dept: ENDOCRINOLOGY | Facility: CLINIC | Age: 36
End: 2021-10-13
Payer: COMMERCIAL

## 2021-10-13 DIAGNOSIS — E03.8 OTHER SPECIFIED HYPOTHYROIDISM: Primary | ICD-10-CM

## 2021-10-13 DIAGNOSIS — E04.1 THYROID NODULE: ICD-10-CM

## 2021-10-13 PROCEDURE — 99214 OFFICE O/P EST MOD 30 MIN: CPT | Mod: 95 | Performed by: INTERNAL MEDICINE

## 2021-10-13 NOTE — PROGRESS NOTES
Venessa is a 36 year old who is being evaluated via a billable video visit.      How would you like to obtain your AVS? MyChart  If the video visit is dropped, the invitation should be resent by:   Will anyone else be joining your video visit? No      Video Start Time: 9:01 AM      S:   Patient presents for evaluation of hyperthyroidism.   This was found on work up for palpitations. They began ~ 2.5 weeks ago the day she got the COVID vaccine.   Then developed tremors and muscle weakness/cramps.   Palpitations are unrelated to activity.   She has lost 8 pounds in this time frame.     Her daughter is 4 months old. This is her second child.   She has an IUD in place now. She is breast feeding.     No prior thyroid disease.     No neck swelling or tenderness.     No new medications or supplements.   Given metoprolol but has not used yet as HR lower recently.     She had not noticed an increase in anxiety or trouble sleeping.     No eye issues.     She is taking 50 mcg of levothyroxine every day.   She has noticed some palpitations again recently.   Plans to breast feed for another month.         ROS: 10 point ROS neg other than the symptoms noted above in the HPI.      Exam:   GENERAL: Healthy, alert and no distress  EYES: Eyes grossly normal to inspection.  No discharge or erythema, or obvious scleral/conjunctival abnormalities.  RESP: No audible wheeze, cough, or visible cyanosis.  No visible retractions or increased work of breathing.    SKIN: Visible skin clear. No significant rash, abnormal pigmentation or lesions.  NEURO: Cranial nerves grossly intact.  Mentation and speech appropriate for age.  PSYCH: Mentation appears normal, affect normal/bright, judgement and insight intact, normal speech and appearance well-groomed.      A/P:   Hyperthyroidism - DDx includes toxic nodule, Graves', and thyroiditis. She had a baby four months ago and the COVID vaccine last month. Either could provoke thyroiditis. Reviewed  potential treatments of I 131, methimazole, and surgery with the patient. Given that she is breast-feeding, would recommend PTU. No uptake and scan as she is breast feeding. We will check TSI, ESR, CRP, and a thyroid US.   Work up unremarkable.   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         Started on levothyroxine in 5/2021.   She is starting to have palpitations again. Wonder if she needs levothyroxine long term.     -No change to medication today.   -Schedule lab.     Thyroid nodule - Incidental finding in workup for above. Too small to need FNA.   -Repeat ultrasound in 3/2022.     Video-Visit Details    Type of service:  Video Visit    Video End Time:9:11 AM    Originating Location (pt. Location): Home    Distant Location (provider location):  [facility]     Platform used for Video Visit: [employer]    Answers for HPI/ROS submitted by the patient on 10/13/2021  General Symptoms: No  Skin Symptoms: No  HENT Symptoms: No  EYE SYMPTOMS: No  HEART SYMPTOMS: No  LUNG SYMPTOMS: No  INTESTINAL SYMPTOMS: No  URINARY SYMPTOMS: No  GYNECOLOGIC SYMPTOMS: No  BREAST SYMPTOMS: No  SKELETAL SYMPTOMS: No  BLOOD SYMPTOMS: No  NERVOUS SYSTEM SYMPTOMS: No  MENTAL HEALTH SYMPTOMS: No

## 2021-10-13 NOTE — LETTER
10/13/2021         RE: Venessa Guillen  [address]  [facility] [id number]        Dear Colleague,    Thank you for referring your patient, Venessa Guillen, to the [facility]. Please see a copy of my visit note below.    Venessa is a 36 year old who is being evaluated via a billable video visit.      How would you like to obtain your AVS? MyChart  If the video visit is dropped, the invitation should be resent by:   Will anyone else be joining your video visit? No      Video Start Time: 9:01 AM      S:   Patient presents for evaluation of hyperthyroidism.   This was found on work up for palpitations. They began ~ 2.5 weeks ago the day she got the COVID vaccine.   Then developed tremors and muscle weakness/cramps.   Palpitations are unrelated to activity.   She has lost 8 pounds in this time frame.     Her daughter is 4 months old. This is her second child.   She has an IUD in place now. She is breast feeding.     No prior thyroid disease.     No neck swelling or tenderness.     No new medications or supplements.   Given metoprolol but has not used yet as HR lower recently.     She had not noticed an increase in anxiety or trouble sleeping.     No eye issues.     She is taking 50 mcg of levothyroxine every day.   She has noticed some palpitations again recently.   Plans to breast feed for another month.         ROS: 10 point ROS neg other than the symptoms noted above in the HPI.      Exam:   GENERAL: Healthy, alert and no distress  EYES: Eyes grossly normal to inspection.  No discharge or erythema, or obvious scleral/conjunctival abnormalities.  RESP: No audible wheeze, cough, or visible cyanosis.  No visible retractions or increased work of breathing.    SKIN: Visible skin clear. No significant rash, abnormal pigmentation or lesions.  NEURO: Cranial nerves grossly intact.  Mentation and speech appropriate for age.  PSYCH: Mentation appears normal, affect normal/bright, judgement and insight  intact, normal speech and appearance well-groomed.      A/P:   Hyperthyroidism - DDx includes toxic nodule, Graves', and thyroiditis. She had a baby four months ago and the COVID vaccine last month. Either could provoke thyroiditis. Reviewed potential treatments of I 131, methimazole, and surgery with the patient. Given that she is breast-feeding, would recommend PTU. No uptake and scan as she is breast feeding. We will check TSI, ESR, CRP, and a thyroid US.   Work up unremarkable.   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         Started on levothyroxine in 5/2021.   She is starting to have palpitations again. Wonder if she needs levothyroxine long term.     -No change to medication today.   -Schedule lab.     Thyroid nodule - Incidental finding in workup for above. Too small to need FNA.   -Repeat ultrasound in 3/2022.     Video-Visit Details    Type of service:  Video Visit    Video End Time:9:11 AM    Originating Location (pt. Location): Home    Distant Location (provider location):  [facility]     Platform used for Video Visit: [employer]    Answers for HPI/ROS submitted by the patient on 10/13/2021  General Symptoms: No  Skin Symptoms: No  HENT Symptoms: No  EYE SYMPTOMS: No  HEART SYMPTOMS: No  LUNG SYMPTOMS: No  INTESTINAL SYMPTOMS: No  URINARY SYMPTOMS: No  GYNECOLOGIC SYMPTOMS: No  BREAST SYMPTOMS: No  SKELETAL SYMPTOMS: No  BLOOD SYMPTOMS: No  NERVOUS SYSTEM SYMPTOMS: No  MENTAL HEALTH SYMPTOMS: No          Again, thank you for allowing me to participate in the care of your patient.        Sincerely,        Zachery Boone MD

## 2021-10-18 ENCOUNTER — LAB (OUTPATIENT)
Dept: LAB | Facility: CLINIC | Age: 36
End: 2021-10-18
Payer: COMMERCIAL

## 2021-10-18 DIAGNOSIS — E03.8 OTHER SPECIFIED HYPOTHYROIDISM: ICD-10-CM

## 2021-10-18 LAB — TSH SERPL DL<=0.005 MIU/L-ACNC: 0.65 MU/L (ref 0.4–4)

## 2021-10-18 PROCEDURE — 84443 ASSAY THYROID STIM HORMONE: CPT

## 2021-10-18 PROCEDURE — 36415 COLL VENOUS BLD VENIPUNCTURE: CPT

## 2021-10-19 DIAGNOSIS — E03.8 OTHER SPECIFIED HYPOTHYROIDISM: Primary | ICD-10-CM

## 2021-11-15 ENCOUNTER — LAB (OUTPATIENT)
Dept: LAB | Facility: CLINIC | Age: 36
End: 2021-11-15
Payer: COMMERCIAL

## 2021-11-15 DIAGNOSIS — E03.8 OTHER SPECIFIED HYPOTHYROIDISM: ICD-10-CM

## 2021-11-15 LAB — TSH SERPL DL<=0.005 MIU/L-ACNC: 1.32 MU/L (ref 0.4–4)

## 2021-11-15 PROCEDURE — 84443 ASSAY THYROID STIM HORMONE: CPT

## 2021-11-15 PROCEDURE — 36415 COLL VENOUS BLD VENIPUNCTURE: CPT

## 2021-11-16 DIAGNOSIS — E03.8 OTHER SPECIFIED HYPOTHYROIDISM: Primary | ICD-10-CM

## 2021-12-03 NOTE — ANESTHESIA PREPROCEDURE EVALUATION
Anesthesia Pre-Procedure Evaluation    Patient: Venessa Guillen   MRN: [id number] : 1985          Preoperative Diagnosis: * No pre-op diagnosis entered *    * No procedures listed *    Past Medical History:   Diagnosis Date     Chickenpox      Past Surgical History:   Procedure Laterality Date     BIOPSY OF SKIN LESION      mole removal     COLONOSCOPY      x2     MOUTH SURGERY      wisdom teeth       Anesthesia Evaluation     .      No history of anesthetic complications          ROS/MED HX    ENT/Pulmonary:  - neg pulmonary ROS     Neurologic:  - neg neurologic ROS     Cardiovascular:  - neg cardiovascular ROS       METS/Exercise Tolerance:     Hematologic:         Musculoskeletal:         GI/Hepatic:  - neg GI/hepatic ROS       Renal/Genitourinary:         Endo:         Psychiatric:         Infectious Disease:         Malignancy:         Other:                          Physical Exam  Normal systems: cardiovascular, pulmonary and dental    Airway   Mallampati: II  TM distance: > 3 FB  Neck ROM: full  Mouth opening: > 3 cm    Dental     Cardiovascular       Pulmonary             Lab Results   Component Value Date    WBC 8.3 2020    HGB 12.8 2020    HCT 38.9 2020     2020    CRP 7.7 2011    SED 2 2011     2011    POTASSIUM 3.7 2011    CHLORIDE 104 2011    CO2 23 2011    BUN 6 2011    CR 0.65 2011    GLC 87 2011    DUNCAN 9.3 2011    ALBUMIN 4.4 2011    PROTTOTAL 7.6 2011    ALT <6 2011    AST 24 2011    ALKPHOS 57 2011    BILITOTAL 0.6 2011    HCG Negative 2008       Preop Vitals  BP Readings from Last 3 Encounters:   20 117/76   10/27/20 134/80   10/22/20 120/72    Pulse Readings from Last 3 Encounters:   20 67   10/27/20 63   10/22/20 69      Resp Readings from Last 3 Encounters:   20 16   10/27/20 14   10/22/20 18    SpO2 Readings from Last 3 Encounters:  regular rate and rhythm , S1, S2 normal ,  , no murmurs, rubs, gallops , no edema "  10/27/19 96%   06/19/19 99%   02/05/19 99%      Temp Readings from Last 1 Encounters:   11/02/20 36.9  C (98.4  F) (Tympanic)    Ht Readings from Last 1 Encounters:   11/02/20 1.664 m (5' 5.5\")      Wt Readings from Last 1 Encounters:   11/02/20 72.8 kg (160 lb 9.6 oz)    Estimated body mass index is 26.32 kg/m  as calculated from the following:    Height as of 11/2/20: 1.664 m (5' 5.5\").    Weight as of 11/2/20: 72.8 kg (160 lb 9.6 oz).       Anesthesia Plan      History & Physical Review      ASA Status:  .  OB Epidural Asa: 2            Postoperative Care      Consents  Anesthetic plan, risks, benefits and alternatives discussed with:  Patient..                 Goldy Erickson CRNA, APRN CRNA  "

## 2021-12-27 ENCOUNTER — LAB (OUTPATIENT)
Dept: LAB | Facility: CLINIC | Age: 36
End: 2021-12-27
Payer: COMMERCIAL

## 2021-12-27 DIAGNOSIS — E03.8 OTHER SPECIFIED HYPOTHYROIDISM: Primary | ICD-10-CM

## 2021-12-27 DIAGNOSIS — E03.8 OTHER SPECIFIED HYPOTHYROIDISM: ICD-10-CM

## 2021-12-27 LAB — TSH SERPL DL<=0.005 MIU/L-ACNC: 1.25 MU/L (ref 0.4–4)

## 2021-12-27 PROCEDURE — 36415 COLL VENOUS BLD VENIPUNCTURE: CPT

## 2021-12-27 PROCEDURE — 84443 ASSAY THYROID STIM HORMONE: CPT

## 2022-03-09 ENCOUNTER — HOSPITAL ENCOUNTER (OUTPATIENT)
Dept: ULTRASOUND IMAGING | Facility: CLINIC | Age: 37
Discharge: HOME | End: 2022-03-09
Attending: INTERNAL MEDICINE | Admitting: INTERNAL MEDICINE
Payer: COMMERCIAL

## 2022-03-09 ENCOUNTER — LAB (OUTPATIENT)
Dept: LAB | Facility: CLINIC | Age: 37
End: 2022-03-09
Payer: COMMERCIAL

## 2022-03-09 DIAGNOSIS — E03.8 OTHER SPECIFIED HYPOTHYROIDISM: ICD-10-CM

## 2022-03-09 LAB — TSH SERPL DL<=0.005 MIU/L-ACNC: 1.05 MU/L (ref 0.4–4)

## 2022-03-09 PROCEDURE — 84443 ASSAY THYROID STIM HORMONE: CPT

## 2022-03-09 PROCEDURE — 36415 COLL VENOUS BLD VENIPUNCTURE: CPT

## 2022-03-09 PROCEDURE — 76536 US EXAM OF HEAD AND NECK: CPT

## 2022-03-30 ENCOUNTER — E-VISIT (OUTPATIENT)
Dept: URGENT CARE | Facility: URGENT CARE | Age: 37
End: 2022-03-30
Payer: COMMERCIAL

## 2022-03-30 DIAGNOSIS — J06.9 VIRAL URI: Primary | ICD-10-CM

## 2022-03-30 PROCEDURE — 99421 OL DIG E/M SVC 5-10 MIN: CPT | Performed by: PHYSICIAN ASSISTANT

## 2022-03-30 NOTE — PATIENT INSTRUCTIONS
The symptoms you describe suggest a viral cause, which is much more common than a bacterial cause. Antibiotics will treat bacterial infections, but have no effect on viral infections. If possible, especially if improving, start with symptom care for the first 7-10 days, then consider seeking further treatment or taking an antibiotic. Bacterial infections generally are more severe, including symptoms such as pus, fever over 101degrees F, or rapidly worsening.  You may want to try a nasal lavage (also known as nasal irrigation). You can find over-the-counter products, such as Neti-Pot, at retail locations or make your own at home. Instructions for homemade nasal lavage and more information on the process are available online at http://www.aafp.org/afp/2009/1115/p1121.html.    Dear Venessa Guillen    After reviewing your responses, I've been able to diagnose you with?a sinus infection caused by a virus.?       * Sinusitis (No Antibiotics)    The sinuses are air-filled spaces within the bones of the face. They connect to the inside of the nose. Sinusitis is an inflammation of the tissue that lines the sinuses. Sinusitis can occur during a cold. It can also happen due to allergies to pollens and other particles in the air. It can cause symptoms such as sinus congestion, headache, sore throat, facial swelling, and a feeling of fullness. It may also cause a low-grade fever. Your sinusitis does not include an infection with bacteria. Because of this, antibiotics are not used to treat this problem.  Home care    Drink plenty of water, hot tea, and other liquids. This may help thin nasal mucus. It also may help your sinuses drain fluids.    Heat may help soothe painful areas of your face. Use a towel soaked in hot water. Or,  the shower and direct the warm spray onto your face. Using a vaporizer along with a menthol rub at night may also help soothe symptoms.     An expectorant with guaifenesin may help thin nasal  mucus and help your sinuses drain fluids.    You can use an over-the-counter decongestant, unless a similar medicine was prescribed to you. Nasal sprays work the fastest. Use one that contains phenylephrine or oxymetazoline. First blow your nose gently. Then use the spray. Do not use these medicines more often than directed on the label. If you do, your symptoms may get worse. You may also take pills that contain pseudoephedrine. Don t use products that combine multiple medicines. This is because side effects may be increased. Read all medicine labels. You can also ask the pharmacist for help. (People with high blood pressure should not use decongestants. They can raise blood pressure.)    Over-the-counter antihistamines may help if allergies contributed to your sinusitis.      Use acetaminophen or ibuprofen to control pain, unless another pain medicine was prescribed to you. If you have chronic liver or kidney disease or ever had a stomach ulcer, talk with your healthcare provider before using these medicines. (Aspirin should never be taken by anyone under age 18 who is ill with a fever. It may cause severe liver damage.)    Use nasal rinses or irrigation as instructed by your healthcare provider.    Don't smoke. This can make symptoms worse.  Follow-up care  Follow up with your healthcare provider or our staff if you are NOT better in 1 week.  When to seek medical advice  Call your healthcare provider if any of these occur:    Green or yellow fluid draining from your nose or into your throat    Facial pain or headache that gets worse    Stiff neck    Unusual drowsiness or confusion    Swelling of your forehead or eyelids    Vision problems, such as blurred or double vision    Fever of 100.4 F (38 C) or higher, or as directed by your healthcare provider    Seizure    Breathing problems    Symptoms that don't go away in 10 days  For informational purposes only. Not to replace the advice of your health care  provider.  Copyright   2018 [facility]. All rights reserved.    ?  Wilian Deutsch PA-C?

## 2022-04-04 DIAGNOSIS — E03.8 OTHER SPECIFIED HYPOTHYROIDISM: ICD-10-CM

## 2022-04-05 RX ORDER — LEVOTHYROXINE SODIUM 50 UG/1
50 TABLET ORAL DAILY
Qty: 90 TABLET | Refills: 0 | Status: SHIPPED | OUTPATIENT
Start: 2022-04-05 | End: 2022-06-28

## 2022-04-05 NOTE — TELEPHONE ENCOUNTER
LEVOTHYROXINE SODIUM 50MCG TABS      Last Written Prescription Date:  5-3-2021  Last Fill Quantity: 30,   # refills: 11  Last Office Visit : 10-  Future Office visit:  NONE    Kathleen M Doege RN

## 2022-04-12 ENCOUNTER — OFFICE VISIT (OUTPATIENT)
Dept: FAMILY MEDICINE | Facility: CLINIC | Age: 37
End: 2022-04-12
Payer: COMMERCIAL

## 2022-04-12 VITALS
WEIGHT: 136 LBS | BODY MASS INDEX: 21.86 KG/M2 | OXYGEN SATURATION: 99 % | SYSTOLIC BLOOD PRESSURE: 108 MMHG | RESPIRATION RATE: 16 BRPM | HEART RATE: 82 BPM | DIASTOLIC BLOOD PRESSURE: 68 MMHG | TEMPERATURE: 97.6 F | HEIGHT: 66 IN

## 2022-04-12 DIAGNOSIS — J02.9 SORE THROAT: Primary | ICD-10-CM

## 2022-04-12 PROCEDURE — 99213 OFFICE O/P EST LOW 20 MIN: CPT | Performed by: NURSE PRACTITIONER

## 2022-04-12 ASSESSMENT — PAIN SCALES - GENERAL: PAINLEVEL: MILD PAIN (3)

## 2022-04-12 NOTE — PROGRESS NOTES
Assessment & Plan     Sore throat  Intermittent sore throat for 3-4 months.  Etiology unclear.  Exam benign.  Differential includes allergies vs GERD vs her thyroid issues vs other.  She reports symptoms are better lately.  Recommended Claritin and Zyrtec 10 mg daily for 2-4 weeks - if improvement, may continue allergy medication.  If no improvement, will refer to ENT for further eval.      The risks, benefits and treatment options of prescribed medications or other treatments have been discussed with the patient. The patient verbalized their understanding and should call or follow up if no improvement or if they develop further problems.    PETER Vieira CNP  [facility] SHARDA Clifford is a 36 year old who presents for the following health issues     History of Present Illness       Reason for visit:  Feeling of somthing in my throat bilaterally  Symptom onset:  More than a month  Symptoms include:  Feel tonsils? Or something when swallow. Waxes and wanes with intermittent ear pressure  Symptom intensity:  Mild  Symptom progression:  Staying the same  Had these symptoms before:  No  What makes it worse:  No  What makes it better:  No    She eats 2-3 servings of fruits and vegetables daily.She consumes 2 sweetened beverage(s) daily.She exercises with enough effort to increase her heart rate 20 to 29 minutes per day.  She exercises with enough effort to increase her heart rate 4 days per week.   She is taking medications regularly.     Allergies?  Onset/Duration: Since December been on going throat problem (started with a feeling of possible hair stuck in throat), is having occasional intermittent swelling in throat. Pt was sick with a head cold about 3 weeks ago and feels as if it has gotten better since.  Symptoms:   Nasal congestion: YES- intermittent   Sneezing: YES- little   Red, itchy eyes: no  Progression of Symptoms: same better  Accompanying Signs &  "Symptoms:  Cough: no  Wheezing: no  Rash: no  Sinus/facial pain: YES- teeth a little bit the past couple months, ear pressure (bilateral)   History:   Is it seasonal: none   History of Asthma: no  Has allergy testing been done: no  Precipitating factors:   None  Alleviating factors:  None  Therapies tried and outcome: None        Review of Systems   Constitutional, HEENT, cardiovascular, pulmonary, gi and gu systems are negative, except as otherwise noted.      Objective    /68   Pulse 82   Temp 97.6  F (36.4  C) (Tympanic)   Resp 16   Ht 1.664 m (5' 5.5\")   Wt 61.7 kg (136 lb)   SpO2 99%   BMI 22.29 kg/m    Body mass index is 22.29 kg/m .  Physical Exam   GENERAL: healthy, alert and no distress  HENT: ear canals and TM's normal, nose and mouth without ulcers or lesions  NECK: no adenopathy, no asymmetry, masses, or scars and thyroid normal to palpation  RESP: lungs clear to auscultation - no rales, rhonchi or wheezes  CV: regular rate and rhythm, normal S1 S2, no S3 or S4, no murmur, click or rub, no peripheral edema and peripheral pulses strong                "

## 2022-06-28 ENCOUNTER — MYC REFILL (OUTPATIENT)
Dept: ENDOCRINOLOGY | Facility: CLINIC | Age: 37
End: 2022-06-28

## 2022-06-28 DIAGNOSIS — E03.8 OTHER SPECIFIED HYPOTHYROIDISM: ICD-10-CM

## 2022-06-29 RX ORDER — LEVOTHYROXINE SODIUM 50 UG/1
50 TABLET ORAL DAILY
Qty: 90 TABLET | Refills: 0 | Status: SHIPPED | OUTPATIENT
Start: 2022-06-29 | End: 2022-09-14

## 2022-09-13 ASSESSMENT — ENCOUNTER SYMPTOMS
SMELL DISTURBANCE: 1
TASTE DISTURBANCE: 1

## 2022-09-14 ENCOUNTER — OFFICE VISIT (OUTPATIENT)
Dept: ENDOCRINOLOGY | Facility: CLINIC | Age: 37
End: 2022-09-14
Payer: COMMERCIAL

## 2022-09-14 VITALS
HEART RATE: 71 BPM | HEIGHT: 66 IN | WEIGHT: 136 LBS | BODY MASS INDEX: 21.86 KG/M2 | DIASTOLIC BLOOD PRESSURE: 78 MMHG | SYSTOLIC BLOOD PRESSURE: 119 MMHG

## 2022-09-14 DIAGNOSIS — E05.90 HYPERTHYROIDISM: Primary | ICD-10-CM

## 2022-09-14 DIAGNOSIS — E04.1 THYROID NODULE: ICD-10-CM

## 2022-09-14 LAB
T4 FREE SERPL-MCNC: 1.76 NG/DL (ref 0.9–1.7)
TSH SERPL DL<=0.005 MIU/L-ACNC: 0.03 UIU/ML (ref 0.3–4.2)

## 2022-09-14 PROCEDURE — 84443 ASSAY THYROID STIM HORMONE: CPT | Performed by: INTERNAL MEDICINE

## 2022-09-14 PROCEDURE — 36415 COLL VENOUS BLD VENIPUNCTURE: CPT | Performed by: INTERNAL MEDICINE

## 2022-09-14 PROCEDURE — 84439 ASSAY OF FREE THYROXINE: CPT | Performed by: INTERNAL MEDICINE

## 2022-09-14 PROCEDURE — 99214 OFFICE O/P EST MOD 30 MIN: CPT | Performed by: INTERNAL MEDICINE

## 2022-09-14 PROCEDURE — 86376 MICROSOMAL ANTIBODY EACH: CPT | Performed by: INTERNAL MEDICINE

## 2022-09-14 NOTE — NURSING NOTE
"Chief Complaint   Patient presents with     Endocrine Problem     Previously saw Dr. Boone.       Initial /78 (BP Location: Right arm, Patient Position: Sitting, Cuff Size: Adult Regular)   Pulse 71   Ht 1.664 m (5' 5.5\")   Wt 61.7 kg (136 lb)   BMI 22.29 kg/m   Estimated body mass index is 22.29 kg/m  as calculated from the following:    Height as of this encounter: 1.664 m (5' 5.5\").    Weight as of this encounter: 61.7 kg (136 lb). .    Sirisha Mac LPN on 9/14/2022 at 12:33 PM    "

## 2022-09-14 NOTE — PROGRESS NOTES
Endocrine Consult note    Attending Assessment/Plan :        Hyperthyroidism  Thyroid nodule    -At this point seems most likely etiology for her previous hyperthyroidism and subsequent hypothyroidism was postpartum thyroiditis  -We will check TPO today, as well as TFTs.  -Assuming TSH comes back normal or low normal, plan to DC levothyroxine and recheck labs in 2 months  -If TSH is high normal, likely continue 50 mcg levothyroxine and still recheck in 2 months.  We will also increase dose if TSH is above pulmonary normal.    #Thyroid nodules  -2 nodules, 1 on left and 1 on the isthmus  -Neither currently meet biopsy criteria  -Repeat thyroid ultrasound in March to assess for any growth or future changes    RTC 1 year    I have independently reviewed and interpreted labs, imaging as indicated.      Chief complaint:  Venessa is a 37 year old female seen for follow-up on postpartum thyroiditis.    HISTORY OF PRESENT ILLNESS    Venessa is a 37-year-old female.  She comes to clinic for follow-up on hypothyroidism.    She was last seen in October 2021.  At that time she reported that the hyperthyroidism was found on a work-up for palpitations that had begun 2 to 3 weeks before that visit.  This is postpartum and delivery of her second child.   She had also endorsed tremors and muscle weakness.  She also had 8 pound weight loss.  Also the time last visit she was breast-feeding.  It was noted that she did not have any history of thyroid disease prior.  She did not require any antithyroid medication at this time.    Following this period of time, she then went hypothyroid.  She was started on 50 mcg levothyroxine.  She is currently on this dose with TSH around 1.0.  She denies signs or symptoms of hypothyroidism.  She reports that she was not symptomatic of hypothyroidism at the time of start, postauricular TSH was above 10 at the time.    Endocrine relevant labs are as follows:  Component      Latest Ref Rng & Units 5/3/2021  6/3/2021 8/30/2021 10/18/2021   T4 Free      0.76 - 1.46 ng/dL 0.72 (L)      TSH      0.40 - 4.00 mU/L 10.71 (H) 3.52 1.17 0.65     Component      Latest Ref Rng & Units 11/15/2021 12/27/2021 3/9/2022   T4 Free      0.76 - 1.46 ng/dL      TSH      0.40 - 4.00 mU/L 1.32 1.25 1.05     Component      Latest Ref Rng & Units 3/11/2021   Thyroid Stim Immunog      <=1.3 TSI index <1.0       REVIEW OF SYSTEMS  Answers for HPI/ROS submitted by the patient on 9/13/2022  General Symptoms: No  Skin Symptoms: No  HENT Symptoms: Yes  EYE SYMPTOMS: No  HEART SYMPTOMS: No  LUNG SYMPTOMS: No  INTESTINAL SYMPTOMS: No  URINARY SYMPTOMS: No  GYNECOLOGIC SYMPTOMS: No  BREAST SYMPTOMS: No  SKELETAL SYMPTOMS: No  BLOOD SYMPTOMS: No  NERVOUS SYSTEM SYMPTOMS: No  MENTAL HEALTH SYMPTOMS: No  Change in taste: Yes  Change in sense of smell: Yes  Dry mouth: Yes      10 system ROS otherwise as per the HPI or negative    Past Medical History  Past Medical History:   Diagnosis Date     Chickenpox        Medications  Current Outpatient Medications   Medication Sig Dispense Refill     acetaminophen (TYLENOL) 500 MG tablet Take 2 tablets (1,000 mg) by mouth every 6 hours as needed for mild pain 100 tablet 0     levonorgestrel (MIRENA) 20 MCG/24HR IUD 1 each (20 mcg) by Intrauterine route once       levothyroxine (SYNTHROID/LEVOTHROID) 50 MCG tablet Take 1 tablet (50 mcg) by mouth daily **SCHEDULE APPOINTMENT TO ESTABLISH CARE WITH NEW PROVIDER** 90 tablet 0     Prenatal MV & Min w/FA-DHA (PRENATAL ADULT GUMMY/DHA/FA PO) Take 2 chew tab by mouth daily          Allergies  Allergies   Allergen Reactions     Shellfish Allergy Anaphylaxis and Hives     Ok with topical iodine     Ceclor [Cefaclor]          Family History  family history includes Diabetes in her maternal grandfather and paternal grandmother; Heart Surgery in her maternal grandfather and paternal grandmother; Hypertension in her maternal grandmother; Lung Cancer in her paternal grandmother;  "Prostate Cancer in her maternal grandfather and paternal grandfather; Skin Cancer in her maternal grandfather; Substance Abuse in her father.    Social History  Social History     Tobacco Use     Smoking status: Never Smoker     Smokeless tobacco: Never Used   Vaping Use     Vaping Use: Never used   Substance Use Topics     Alcohol use: Yes     Comment: Minimal     Drug use: No       Physical Exam  /78 (BP Location: Right arm, Patient Position: Sitting, Cuff Size: Adult Regular)   Pulse 71   Ht 1.664 m (5' 5.5\")   Wt 61.7 kg (136 lb)   BMI 22.29 kg/m    Body mass index is 22.29 kg/m .    Physical Exam    GENERAL :  In no apparent distress  SKIN: Normal color, normal temperature     EYES: EOMI, No scleral icterus  NECK: No visible masses  RESP: No respiratory distress, normal effort  CARDIO: regular rate  ABDOMEN: flat, nondistended       NEURO: awake, alert, responds appropriately to questions   EXTREMITIES: No clubbing, cyanosis or edema.    I spent 35 minutes on today's visit in chart review, review of previous progress notes, review of labs, exam, and counseling the patient.  "

## 2022-09-15 LAB — THYROPEROXIDASE AB SERPL-ACNC: 372 IU/ML

## 2022-09-23 DIAGNOSIS — E03.8 OTHER SPECIFIED HYPOTHYROIDISM: Primary | ICD-10-CM

## 2022-09-23 DIAGNOSIS — E05.90 HYPERTHYROIDISM: ICD-10-CM

## 2022-11-08 ENCOUNTER — E-VISIT (OUTPATIENT)
Dept: FAMILY MEDICINE | Facility: CLINIC | Age: 37
End: 2022-11-08
Payer: COMMERCIAL

## 2022-11-08 DIAGNOSIS — J01.90 ACUTE BACTERIAL SINUSITIS: Primary | ICD-10-CM

## 2022-11-08 DIAGNOSIS — B96.89 ACUTE BACTERIAL SINUSITIS: Primary | ICD-10-CM

## 2022-11-08 PROCEDURE — 99421 OL DIG E/M SVC 5-10 MIN: CPT | Performed by: PHYSICIAN ASSISTANT

## 2022-11-08 NOTE — PATIENT INSTRUCTIONS
You may want to try a nasal lavage (also known as nasal irrigation). You can find over-the-counter products, such as Neti-Pot, at retail locations or make your own at home. Instructions for homemade nasal lavage and more information on the process are available online at http://www.aafp.org/afp/2009/1115/p1121.html.      Sinusitis (Antibiotic Treatment)    The sinuses are air-filled spaces within the bones of the face. They connect to the inside of the nose. Sinusitis is an inflammation of the tissue that lines the sinuses. Sinusitis can occur during a cold. It can also happen due to allergies to pollens and other particles in the air. Sinusitis can cause symptoms of sinus congestion and a feeling of fullness. A sinus infection causes fever, headache, and facial pain. There is often green or yellow fluid draining from the nose or into the back of the throat (post-nasal drip). You have been given antibiotics to treat this condition.   Home care  Take the full course of antibiotics as instructed. Don't stop taking them, even when you feel better.  Drink plenty of water, hot tea, and other liquids as directed by the healthcare provider. This may help thin nasal mucus. It also may help your sinuses drain fluids.  Heat may help soothe painful areas of your face. Use a towel soaked in hot water. Or,  the shower and direct the warm spray onto your face. Using a vaporizer along with a menthol rub at night may also help soothe symptoms.   An expectorant with guaifenesin may help thin nasal mucus and help your sinuses drain fluids. Talk with your provider or pharmacists before taking an over-the-counter (OTC) medicine if you have any questions about it or its side effects..  You can use an OTC decongestant, unless a similar medicine was prescribed to you. Nasal sprays work the fastest. Use one that contains phenylephrine or oxymetazoline. First blow your nose gently. Then use the spray. Don't use these medicines  more often than directed on the label. If you do, your symptoms may get worse. You may also take pills that contain pseudoephedrine. Don t use products that combine multiple medicines. This is because side effects may be increased. Read labels. You can also ask the pharmacist for help. (People with high blood pressure should not use decongestants. They can raise blood pressure.) Talk with your provider or pharmacist if you have any questions about the medicine..  OTC antihistamines may help if allergies contributed to your sinusitis. Talk with your provider or pharmacist if you have any questions about the medicine..  Don't use nasal rinses or irrigation during an acute sinus infection, unless your healthcare provider tells you to. Rinsing may spread the infection to other areas in your sinuses.  Use acetaminophen or ibuprofen to control pain, unless another pain medicine was prescribed to you. If you have chronic liver or kidney disease or ever had a stomach ulcer, talk with your healthcare provider before using these medicines. Never give aspirin to anyone under age 18 who is ill with a fever. It may cause severe liver damage.  Don't smoke. This can make symptoms worse.    Follow-up care  Follow up with your healthcare provider, or as advised.   When to seek medical advice  Call your healthcare provider if any of these occur:   Facial pain or headache that gets worse  Stiff neck  Unusual drowsiness or confusion  Swelling of your forehead or eyelids  Symptoms don't go away in 10 days  Vision problems, such as blurred or double vision  Fever of 100.4 F (38 C) or higher, or as directed by your healthcare provider  Call 911  Call 911 if any of these occur:   Seizure  Trouble breathing  Feeling dizzy or faint  Fingernails, skin or lips look blue, purple , or gray  Prevention  Here are steps you can take to help prevent an infection:   Keep good hand washing habits.  Don t have close contact with people who have sore  throats, colds, or other upper respiratory infections.  Don t smoke, and stay away from secondhand smoke.  Stay up to date with of your vaccines.  [employer] last reviewed this educational content on 12/1/2019 2000-2021 [employer]. All rights reserved. This information is not intended as a substitute for professional medical care. Always follow your healthcare professional's instructions.        Dear Venessa Guillen    After reviewing your responses, I've been able to diagnose you with?a sinus infection caused by bacteria.?     Based on your responses and diagnosis, I have prescribed antibiotics to treat your symptoms. I have sent this to your pharmacy.?     It is also important to stay well hydrated, get lots of rest and take over-the-counter decongestants,?tylenol?or ibuprofen if you?are able to?take those medications per your primary care provider to help relieve discomfort.?     It is important that you take?all of?your prescribed medication even if your symptoms are improving after a few doses.? Taking?all of?your medicine helps prevent the symptoms from returning.?     If your symptoms worsen, you develop severe headache, vomiting, high fever (>102), or are not improving in 7 days, please contact your primary care provider for an appointment or visit any of our convenient Walk-in Care or Urgent Care Centers to be seen which can be found on our website?here.?     Thanks again for choosing?us?as your health care partner,?   ?  Maki Garrido PA-C?

## 2022-11-16 ENCOUNTER — LAB (OUTPATIENT)
Dept: LAB | Facility: CLINIC | Age: 37
End: 2022-11-16
Payer: COMMERCIAL

## 2022-11-16 DIAGNOSIS — E05.90 HYPERTHYROIDISM: ICD-10-CM

## 2022-11-16 DIAGNOSIS — E03.8 OTHER SPECIFIED HYPOTHYROIDISM: ICD-10-CM

## 2022-11-16 LAB
T4 FREE SERPL-MCNC: 0.94 NG/DL (ref 0.9–1.7)
TSH SERPL DL<=0.005 MIU/L-ACNC: 3.97 UIU/ML (ref 0.3–4.2)

## 2022-11-16 PROCEDURE — 36416 COLLJ CAPILLARY BLOOD SPEC: CPT

## 2022-11-16 PROCEDURE — 84439 ASSAY OF FREE THYROXINE: CPT

## 2022-11-16 PROCEDURE — 84443 ASSAY THYROID STIM HORMONE: CPT

## 2022-12-19 ENCOUNTER — OFFICE VISIT (OUTPATIENT)
Dept: FAMILY MEDICINE | Facility: CLINIC | Age: 37
End: 2022-12-19
Payer: COMMERCIAL

## 2022-12-19 VITALS
SYSTOLIC BLOOD PRESSURE: 112 MMHG | BODY MASS INDEX: 22.11 KG/M2 | WEIGHT: 137.6 LBS | TEMPERATURE: 98.1 F | DIASTOLIC BLOOD PRESSURE: 72 MMHG | HEART RATE: 70 BPM | HEIGHT: 66 IN | OXYGEN SATURATION: 100 %

## 2022-12-19 DIAGNOSIS — H65.93 OME (OTITIS MEDIA WITH EFFUSION), BILATERAL: Primary | ICD-10-CM

## 2022-12-19 PROCEDURE — 0124A COVID-19 VACCINE BIVALENT BOOSTER 12+ (PFIZER): CPT | Performed by: STUDENT IN AN ORGANIZED HEALTH CARE EDUCATION/TRAINING PROGRAM

## 2022-12-19 PROCEDURE — 91312 COVID-19 VACCINE BIVALENT BOOSTER 12+ (PFIZER): CPT | Performed by: STUDENT IN AN ORGANIZED HEALTH CARE EDUCATION/TRAINING PROGRAM

## 2022-12-19 PROCEDURE — 99213 OFFICE O/P EST LOW 20 MIN: CPT | Mod: 25 | Performed by: STUDENT IN AN ORGANIZED HEALTH CARE EDUCATION/TRAINING PROGRAM

## 2022-12-19 RX ORDER — FEXOFENADINE HCL AND PSEUDOEPHEDRINE HCL 180; 240 MG/1; MG/1
1 TABLET, EXTENDED RELEASE ORAL DAILY
Qty: 14 TABLET | Refills: 0 | Status: SHIPPED | OUTPATIENT
Start: 2022-12-19 | End: 2024-02-22

## 2022-12-19 RX ORDER — FLUTICASONE PROPIONATE 50 MCG
1 SPRAY, SUSPENSION (ML) NASAL DAILY
Qty: 16 G | Refills: 1 | Status: SHIPPED | OUTPATIENT
Start: 2022-12-19 | End: 2024-02-22

## 2022-12-19 ASSESSMENT — PAIN SCALES - GENERAL: PAINLEVEL: NO PAIN (0)

## 2022-12-19 NOTE — PATIENT INSTRUCTIONS
Hello! It was a pleasure seeing you today. Just some things we discussed at today's visit:     Congestion/Fluid Behind the Ear    Fancy term is Otitis Media with Effusion   Take allegra D daily preferably in the am   Flonase daily as well, will need to use it consistently     Sincerely,     Shawna Angulo MD

## 2022-12-19 NOTE — PROGRESS NOTES
"  1. OME (otitis media with effusion) bilateral, but left greater than right   > has already tried over the counter decongestants, occasionally used antihistamines   > symptoms are not worsening   > physical exam findings not consistent with ear infection   - fluticasone (FLONASE) 50 MCG/ACT nasal spray; Spray 1 spray into both nostrils daily  Dispense: 16 g; Refill: 1  - fexofenadine-pseudoePHEDrine (ALLEGRA-D 24) 180-240 MG 24 hr tablet; Take 1 tablet by mouth daily  Dispense: 14 tablet; Refill: 0    Shawna Angulo MD       Melchor Clifford is a 37 year old, presenting for the following health issues:  Ear Problem      History of Present Illness       Reason for visit:  Ear pain/popping x 2 weeks  Symptom onset:  1-2 weeks ago  Symptoms include:  Left ear popping  Symptom intensity:  Mild  Symptom progression:  Staying the same  Had these symptoms before:  No  What makes it worse:  Valsuva manuver  What makes it better:  Advil/tylenol    She eats 2-3 servings of fruits and vegetables daily.She consumes 2 sweetened beverage(s) daily.She exercises with enough effort to increase her heart rate 30 to 60 minutes per day.  She exercises with enough effort to increase her heart rate 4 days per week.   She is taking medications regularly.  Denies hearing changes, nausea, vomiting, fevers, chills, dizziness, syncope, discharge from ear   Tried decongestants intermittently for the last few weeks   Plugging nose and blowing caused a lot of pain   Covid back Sept 1st 2022        Review of Systems   As above       Objective    /72 (BP Location: Left arm, Patient Position: Sitting, Cuff Size: Adult Regular)   Pulse 70   Temp 98.1  F (36.7  C) (Tympanic)   Ht 1.664 m (5' 5.5\")   Wt 62.4 kg (137 lb 9.6 oz)   SpO2 100%   BMI 22.55 kg/m    Body mass index is 22.55 kg/m .  Physical Exam  Constitutional:       Appearance: Normal appearance.   HENT:      Head:      Comments: No lymphadenopathy or tenderness to palpation " of the lymph nodes      Right Ear: Ear canal and external ear normal. There is no impacted cerumen.      Left Ear: Ear canal and external ear normal. There is no impacted cerumen.      Ears:      Comments: Both TM had fluid behind the membranes, however the left side did have more noticeable fluid than the right   No noted hemotympanum, trauma, lesions, bulging or obvious signs of infection      Mouth/Throat:      Mouth: Mucous membranes are moist.      Pharynx: Oropharynx is clear. No oropharyngeal exudate or posterior oropharyngeal erythema.      Comments: Mild posterior draining   Eyes:      General: No scleral icterus.        Right eye: No discharge.         Left eye: No discharge.      Extraocular Movements: Extraocular movements intact.      Conjunctiva/sclera: Conjunctivae normal.   Pulmonary:      Effort: Pulmonary effort is normal. No respiratory distress.   Musculoskeletal:         General: Normal range of motion.      Cervical back: Normal range of motion and neck supple.   Neurological:      General: No focal deficit present.      Mental Status: She is alert and oriented to person, place, and time.   Psychiatric:         Mood and Affect: Mood normal.

## 2022-12-19 NOTE — NURSING NOTE
Immunizations Administered     Name Date Dose VIS Date Route    COVID-19 Vaccine Bivalent Booster 12+ (Pfizer) 12/19/22 11:11 AM 0.3 mL EUA,08/31/2022,Given today Intramuscular        After obtaining informed and written consent, the COVID-19 PFIZER BIVALENT BOOSTER immunization is given in the left deltoid. Patient verified all immunization questions prior to vaccination. Patient is remaining in the facility for at least 15 minutes to monitor for adverse reactions.    MEG Garibay LPN on 12/19/2022 at 11:15 AM

## 2023-02-15 ENCOUNTER — LAB (OUTPATIENT)
Dept: LAB | Facility: CLINIC | Age: 38
End: 2023-02-15
Payer: COMMERCIAL

## 2023-02-15 DIAGNOSIS — E05.90 HYPERTHYROIDISM: ICD-10-CM

## 2023-02-15 DIAGNOSIS — E03.8 OTHER SPECIFIED HYPOTHYROIDISM: ICD-10-CM

## 2023-02-15 LAB
T4 FREE SERPL-MCNC: 1 NG/DL (ref 0.9–1.7)
TSH SERPL DL<=0.005 MIU/L-ACNC: 2.42 UIU/ML (ref 0.3–4.2)

## 2023-02-15 PROCEDURE — 84439 ASSAY OF FREE THYROXINE: CPT

## 2023-02-15 PROCEDURE — 84443 ASSAY THYROID STIM HORMONE: CPT

## 2023-02-15 PROCEDURE — 36415 COLL VENOUS BLD VENIPUNCTURE: CPT

## 2023-03-01 ENCOUNTER — OFFICE VISIT (OUTPATIENT)
Dept: URGENT CARE | Facility: URGENT CARE | Age: 38
End: 2023-03-01
Payer: COMMERCIAL

## 2023-03-01 VITALS
DIASTOLIC BLOOD PRESSURE: 74 MMHG | WEIGHT: 140 LBS | TEMPERATURE: 97.9 F | OXYGEN SATURATION: 100 % | BODY MASS INDEX: 22.94 KG/M2 | RESPIRATION RATE: 18 BRPM | SYSTOLIC BLOOD PRESSURE: 110 MMHG | HEART RATE: 82 BPM

## 2023-03-01 DIAGNOSIS — R50.9 FEVER, UNSPECIFIED FEVER CAUSE: ICD-10-CM

## 2023-03-01 DIAGNOSIS — R07.0 THROAT PAIN: Primary | ICD-10-CM

## 2023-03-01 LAB
DEPRECATED S PYO AG THROAT QL EIA: NEGATIVE
FLUAV AG SPEC QL IA: NEGATIVE
FLUBV AG SPEC QL IA: NEGATIVE
GROUP A STREP BY PCR: NOT DETECTED

## 2023-03-01 PROCEDURE — 87651 STREP A DNA AMP PROBE: CPT

## 2023-03-01 PROCEDURE — 99213 OFFICE O/P EST LOW 20 MIN: CPT

## 2023-03-01 PROCEDURE — 87804 INFLUENZA ASSAY W/OPTIC: CPT

## 2023-03-01 ASSESSMENT — PAIN SCALES - GENERAL: PAINLEVEL: NO PAIN (0)

## 2023-03-01 NOTE — PATIENT INSTRUCTIONS
Strep and influenza tests are negative.  Get plenty of rest and drink fluids.  Can use Tylenol and/or ibuprofen as needed for pain and fever.  Maximum dose of Tylenol is 4000mg in a 24 hour period of time.  Take ibuprofen with food to avoid stomach upset.

## 2023-03-01 NOTE — RESULT ENCOUNTER NOTE
Final Influenza A/B antigen is NEGATIVE for Influenza A and Influenza B    No treatment or change in treatment per [facility] Respiratory Virus Panel or Influenza A/B antigen protocol.

## 2023-03-01 NOTE — PROGRESS NOTES
ASSESSMENT:   (R07.0) Throat pain  (primary encounter diagnosis)  Plan: Streptococcus A Rapid Screen w/Reflex to PCR -         Clinic Collect, Group A Streptococcus PCR         Throat Swab    (R50.9) Fever, unspecified fever cause  Plan: Streptococcus A Rapid Screen w/Reflex to PCR -         Clinic Collect, Influenza A & B Antigen -         Clinic Collect, Group A Streptococcus PCR         Throat Swab    PLAN:  Informed the patient that the strep and influenza tests are negative.  We discussed the need for her to get plenty of rest, drink fluids and use Tylenol and or ibuprofen as needed for pain and fever with the maximum dose Tylenol being 4000 mg in a 24-hour period of time and to take ibuprofen with food to avoid upset stomach.  Informed the patient to return to clinic with any new or worsening symptoms.  Patient acknowledged her understanding of the above plan.    The use of Dragon/Yardsaleation services may have been used to construct the content in this note; any grammatical or spelling errors are non-intentional. Please contact the author of this note directly if you are in need of any clarification.      PETER Slaughter CNP      SUBJECTIVE:   Venessa Guillen  is a 37 year old female who is here today because of: Sore Throat.  Onset of symptoms was 4 days ago. Course of illness is worsening.  The patient also reports sinus congestion starting yesterday and a fever last night  Patient admits to exposure to illness at home.   Patient denies vomiting and diarrhea  Treatment measures tried include Tylenol sinus and Advil.    At home COVID test negative.     ROS:  Negative except noted above.      OBJECTIVE:   General: healthy, alert and no distress  Eyes - conjunctivae clear.  Ears - External ears normal. Canals clear. TM's normal.  Nose/Sinuses - Nares normal.Mucosa normal. No drainage or sinus tenderness.  Oropharynx - Lips, mucosa, tonsils, oropharynx and tongue normal.  Neck - Neck supple; no  lymphadenopathy  Lungs - Lungs clear; no wheezing or rales.  Heart - regular rate and rhythm. No murmurs, rub.    Labs:  Rapid Strep test is negative; await throat culture results.

## 2023-03-15 ENCOUNTER — HOSPITAL ENCOUNTER (OUTPATIENT)
Dept: ULTRASOUND IMAGING | Facility: CLINIC | Age: 38
Discharge: HOME | End: 2023-03-15
Attending: INTERNAL MEDICINE | Admitting: INTERNAL MEDICINE
Payer: COMMERCIAL

## 2023-03-15 DIAGNOSIS — E04.1 THYROID NODULE: ICD-10-CM

## 2023-03-15 PROCEDURE — 76536 US EXAM OF HEAD AND NECK: CPT

## 2023-04-24 ENCOUNTER — OFFICE VISIT (OUTPATIENT)
Dept: OBGYN | Facility: CLINIC | Age: 38
End: 2023-04-24
Payer: COMMERCIAL

## 2023-04-24 VITALS
RESPIRATION RATE: 16 BRPM | HEART RATE: 70 BPM | WEIGHT: 136 LBS | SYSTOLIC BLOOD PRESSURE: 105 MMHG | TEMPERATURE: 99 F | BODY MASS INDEX: 22.66 KG/M2 | DIASTOLIC BLOOD PRESSURE: 70 MMHG | HEIGHT: 65 IN

## 2023-04-24 DIAGNOSIS — T83.32XA INTRAUTERINE CONTRACEPTIVE DEVICE THREADS LOST, INITIAL ENCOUNTER: ICD-10-CM

## 2023-04-24 DIAGNOSIS — Z12.4 CERVICAL CANCER SCREENING: Primary | ICD-10-CM

## 2023-04-24 DIAGNOSIS — Z01.419 ENCOUNTER FOR ANNUAL ROUTINE GYNECOLOGICAL EXAMINATION: ICD-10-CM

## 2023-04-24 PROBLEM — Z34.80 PRENATAL CARE, SUBSEQUENT PREGNANCY: Status: RESOLVED | Noted: 2020-04-10 | Resolved: 2023-04-24

## 2023-04-24 PROCEDURE — G0145 SCR C/V CYTO,THINLAYER,RESCR: HCPCS | Performed by: OBSTETRICS & GYNECOLOGY

## 2023-04-24 PROCEDURE — 87624 HPV HI-RISK TYP POOLED RSLT: CPT | Performed by: OBSTETRICS & GYNECOLOGY

## 2023-04-24 PROCEDURE — 99395 PREV VISIT EST AGE 18-39: CPT | Performed by: OBSTETRICS & GYNECOLOGY

## 2023-04-24 NOTE — NURSING NOTE
"Initial /70 (BP Location: Right arm, Patient Position: Chair, Cuff Size: Adult Regular)   Pulse 70   Temp 99  F (37.2  C) (Tympanic)   Resp 16   Ht 1.651 m (5' 5\")   Wt 61.7 kg (136 lb)   BMI 22.63 kg/m   Estimated body mass index is 22.63 kg/m  as calculated from the following:    Height as of this encounter: 1.651 m (5' 5\").    Weight as of this encounter: 61.7 kg (136 lb). .      "

## 2023-04-24 NOTE — PROGRESS NOTES
SUBJECTIVE:   CC: Venessa Guillen is an 37 year old woman who presents for preventive health visit. No concerns today. Due for pap. Using Mirena for contraception, has amenorrhea with this. Undecided of future fertility.       Patient has been advised of split billing requirements and indicates understanding: Yes  Healthy Habits:    Do you get at least three servings of calcium containing foods daily (dairy, green leafy vegetables, etc.)? yes    Amount of exercise or daily activities, outside of work: 3-5 day(s) per week    Problems taking medications regularly No    Medication side effects: No    Have you had an eye exam in the past two years? no    Do you see a dentist twice per year? no    Do you have sleep apnea, excessive snoring or daytime drowsiness?no          Today's PHQ-2 Score:     4/12/2022    12:06 AM 12/15/2020     9:27 AM   PHQ-2 ( 1999 Pfizer)   Q1: Little interest or pleasure in doing things 0 0   Q2: Feeling down, depressed or hopeless 0 0   PHQ-2 Score 0 0   PHQ-2 Total Score (12-17 Years)- Positive if 3 or more points; Administer PHQ-A if positive  0   Q1: Little interest or pleasure in doing things Not at all    Q2: Feeling down, depressed or hopeless Not at all    PHQ-2 Score 0        Abuse: Current or Past(Physical, Sexual or Emotional)- No  Do you feel safe in your environment? Yes        Social History     Tobacco Use     Smoking status: Never     Smokeless tobacco: Never   Vaping Use     Vaping status: Never Used   Substance Use Topics     Alcohol use: Yes     Comment: Minimal     If you drink alcohol do you typically have >3 drinks per day or >7 drinks per week? No                     History of abnormal Pap smear: NO - age 30-65 PAP every 5 years with negative HPV co-testing recommended      Latest Ref Rng & Units 3/6/2018     2:31 PM 3/6/2018     2:30 PM   PAP / HPV   PAP (Historical)  NIL      HPV 16 DNA NEG^Negative  Negative     HPV 18 DNA NEG^Negative  Negative     Other HR HPV  "NEG^Negative  Negative       Reviewed and updated as needed this visit by clinical staff   Tobacco  Allergies  Meds              Reviewed and updated as needed this visit by Provider                 Past Medical History:   Diagnosis Date     Chickenpox       Past Surgical History:   Procedure Laterality Date     BIOPSY OF SKIN LESION      mole removal     COLONOSCOPY      x2     MOUTH SURGERY      wisdom teeth     OB History    Para Term  AB Living   2 2 2 0 0 2   SAB IAB Ectopic Multiple Live Births   0 0 0 0 2      # Outcome Date GA Lbr Garry/2nd Weight Sex Delivery Anes PTL Lv   2 Term 20 38w3d 03:53 / 00:09 3.402 kg (7 lb 8 oz) F Vag-Spont INT N [place]      Name: SILVIAFEMALE-NADIA      Apgar1: 9  Apgar5: 9   1 Term 10/12/18 39w3d 17:10 / 01:17 3.771 kg (8 lb 5 oz) M Vag-Spont EPI N [place]      Name: Pacheco      Apgar1: 9  Apgar5: 9       ROS:  CONSTITUTIONAL: NEGATIVE for fever, chills, change in weight  INTEGUMENTARU/SKIN: NEGATIVE for worrisome rashes, moles or lesions  RESP: NEGATIVE for significant cough or SOB  BREAST: NEGATIVE for masses, tenderness or discharge  CV: NEGATIVE for chest pain, palpitations or peripheral edema  GI: NEGATIVE for nausea, abdominal pain, heartburn, or change in bowel habits  : NEGATIVE for unusual urinary or vaginal symptoms. Amenorrhea with Mirena  MUSCULOSKELETAL: NEGATIVE for significant arthralgias or myalgia  NEURO: NEGATIVE for weakness, dizziness or paresthesias  PSYCHIATRIC: NEGATIVE for changes in mood or affect    OBJECTIVE:   /70 (BP Location: Right arm, Patient Position: Chair, Cuff Size: Adult Regular)   Pulse 70   Temp 99  F (37.2  C) (Tympanic)   Resp 16   Ht 1.651 m (5' 5\")   Wt 61.7 kg (136 lb)   BMI 22.63 kg/m    EXAM:  General appearance: well-hydrated, A&O x 3, no apparent distress  Lungs: Equal expansion bilaterally, no accessory muscle use  Heart: No heaves or thrills.   Constitutional: See vitals  Abdomen: Soft, non-tender, " "non-distended. No rebound, rigidity, or guarding.  Extremities: no edema  Neuro: CN II-XII grossly intact  Genitourinary:  External genitalia: no erythema, no lesions.   Urethral meatus appropriate location without lesions or prolapse  Anus and Perineum: Unremarkable, no visible lesions  Vagina: Normal, healthy pink mucosa without any lesions. Physiologic vaginal discharge.   Cervix: normal appearance, no cervical motion tenderness. Unable to visualized IUD strings.  Uterus: normal size, shape and consistency.   Adnexa: no masses or tenderness bilaterally.  Breast: No masses, skin, nipple or axillary changes    BSUS: IUD located in the uterus      ASSESSMENT/PLAN:   Venessa was seen today for physical.    Diagnoses and all orders for this visit:    Cervical cancer screening  -     Pap screen with HPV - recommended age 30 - 65 years    Encounter for annual routine gynecological examination    Intrauterine contraceptive device threads lost, initial encounter    Unable to see IUD strings on exam. Bedside US shows IUD in the uterus. Discussed return precautions. Plan to remain in situ until she desires removal or exchange.      COUNSELING:   Reviewed preventive health counseling, as reflected in patient instructions       Regular exercise       Healthy diet/nutrition       Contraception       Family planning    Estimated body mass index is 22.63 kg/m  as calculated from the following:    Height as of this encounter: 1.651 m (5' 5\").    Weight as of this encounter: 61.7 kg (136 lb).        She reports that she has never smoked. She has never used smokeless tobacco.      Counseling Resources:  ATP IV Guidelines  Pooled Cohorts Equation Calculator  Breast Cancer Risk Calculator  BRCA-Related Cancer Risk Assessment: FHS-7 Tool  FRAX Risk Assessment  ICSI Preventive Guidelines  Dietary Guidelines for Americans, 2010  USDA's MyPlate  ASA Prophylaxis  Lung CA Screening    DO OLIVE Hernandez [facility] " [place]

## 2023-04-26 LAB
BKR LAB AP GYN ADEQUACY: NORMAL
BKR LAB AP GYN INTERPRETATION: NORMAL
BKR LAB AP HPV REFLEX: NORMAL
BKR LAB AP PREVIOUS ABNORMAL: NORMAL
PATH REPORT.COMMENTS IMP SPEC: NORMAL
PATH REPORT.COMMENTS IMP SPEC: NORMAL
PATH REPORT.RELEVANT HX SPEC: NORMAL

## 2023-04-27 LAB
HUMAN PAPILLOMA VIRUS 16 DNA: NEGATIVE
HUMAN PAPILLOMA VIRUS 18 DNA: NEGATIVE
HUMAN PAPILLOMA VIRUS FINAL DIAGNOSIS: NORMAL
HUMAN PAPILLOMA VIRUS OTHER HR: NEGATIVE

## 2023-05-15 ENCOUNTER — LAB (OUTPATIENT)
Dept: LAB | Facility: CLINIC | Age: 38
End: 2023-05-15
Payer: COMMERCIAL

## 2023-05-15 DIAGNOSIS — E03.8 OTHER SPECIFIED HYPOTHYROIDISM: ICD-10-CM

## 2023-05-15 DIAGNOSIS — E04.1 THYROID NODULE: ICD-10-CM

## 2023-05-15 DIAGNOSIS — E05.90 HYPERTHYROIDISM: ICD-10-CM

## 2023-05-15 LAB
ALBUMIN SERPL BCG-MCNC: 4.6 G/DL (ref 3.5–5.2)
ALP SERPL-CCNC: 50 U/L (ref 35–104)
ALT SERPL W P-5'-P-CCNC: 13 U/L (ref 10–35)
ANION GAP SERPL CALCULATED.3IONS-SCNC: 11 MMOL/L (ref 7–15)
AST SERPL W P-5'-P-CCNC: 19 U/L (ref 10–35)
BILIRUB SERPL-MCNC: 0.5 MG/DL
BUN SERPL-MCNC: 12.8 MG/DL (ref 6–20)
CALCIUM SERPL-MCNC: 9 MG/DL (ref 8.6–10)
CHLORIDE SERPL-SCNC: 106 MMOL/L (ref 98–107)
CREAT SERPL-MCNC: 0.66 MG/DL (ref 0.51–0.95)
DEPRECATED HCO3 PLAS-SCNC: 23 MMOL/L (ref 22–29)
ERYTHROCYTE [DISTWIDTH] IN BLOOD BY AUTOMATED COUNT: 12.1 % (ref 10–15)
GFR SERPL CREATININE-BSD FRML MDRD: >90 ML/MIN/1.73M2
GLUCOSE SERPL-MCNC: 83 MG/DL (ref 70–99)
HCT VFR BLD AUTO: 43.5 % (ref 35–47)
HGB BLD-MCNC: 14.2 G/DL (ref 11.7–15.7)
MCH RBC QN AUTO: 28.9 PG (ref 26.5–33)
MCHC RBC AUTO-ENTMCNC: 32.6 G/DL (ref 31.5–36.5)
MCV RBC AUTO: 88 FL (ref 78–100)
PLATELET # BLD AUTO: 262 10E3/UL (ref 150–450)
POTASSIUM SERPL-SCNC: 3.8 MMOL/L (ref 3.4–5.3)
PROT SERPL-MCNC: 7.1 G/DL (ref 6.4–8.3)
RBC # BLD AUTO: 4.92 10E6/UL (ref 3.8–5.2)
SODIUM SERPL-SCNC: 140 MMOL/L (ref 136–145)
T4 FREE SERPL-MCNC: 1.11 NG/DL (ref 0.9–1.7)
TSH SERPL DL<=0.005 MIU/L-ACNC: 2.22 UIU/ML (ref 0.3–4.2)
WBC # BLD AUTO: 5.8 10E3/UL (ref 4–11)

## 2023-05-15 PROCEDURE — 84443 ASSAY THYROID STIM HORMONE: CPT

## 2023-05-15 PROCEDURE — 85027 COMPLETE CBC AUTOMATED: CPT

## 2023-05-15 PROCEDURE — 36415 COLL VENOUS BLD VENIPUNCTURE: CPT

## 2023-05-15 PROCEDURE — 80053 COMPREHEN METABOLIC PANEL: CPT

## 2023-05-15 PROCEDURE — 84439 ASSAY OF FREE THYROXINE: CPT

## 2023-06-05 ENCOUNTER — OFFICE VISIT (OUTPATIENT)
Dept: DERMATOLOGY | Facility: CLINIC | Age: 38
End: 2023-06-05
Payer: COMMERCIAL

## 2023-06-05 DIAGNOSIS — D22.9 NEVUS: Primary | ICD-10-CM

## 2023-06-05 DIAGNOSIS — D18.01 ANGIOMA OF SKIN: ICD-10-CM

## 2023-06-05 DIAGNOSIS — Z87.898 HISTORY OF ATYPICAL NEVUS: ICD-10-CM

## 2023-06-05 DIAGNOSIS — L81.4 LENTIGO: ICD-10-CM

## 2023-06-05 DIAGNOSIS — D48.5 NEOPLASM OF UNCERTAIN BEHAVIOR OF SKIN: ICD-10-CM

## 2023-06-05 PROCEDURE — 11102 TANGNTL BX SKIN SINGLE LES: CPT | Performed by: PHYSICIAN ASSISTANT

## 2023-06-05 PROCEDURE — 88342 IMHCHEM/IMCYTCHM 1ST ANTB: CPT | Performed by: PATHOLOGY

## 2023-06-05 PROCEDURE — 99213 OFFICE O/P EST LOW 20 MIN: CPT | Mod: 25 | Performed by: PHYSICIAN ASSISTANT

## 2023-06-05 PROCEDURE — 88305 TISSUE EXAM BY PATHOLOGIST: CPT | Performed by: PATHOLOGY

## 2023-06-05 ASSESSMENT — PAIN SCALES - GENERAL: PAINLEVEL: NO PAIN (0)

## 2023-06-05 NOTE — PROGRESS NOTES
HPI:   CC: Venessa Guillen is a 37 year old female who presents for Full skin cancer screening.  Last Skin Exam: 2 years ago      1st Baseline: no  Personal HX of Skin Cancer: no   Personal HX of Malignant Melanoma: no   Family HX of Skin Cancer / Malignant Melanoma: no  Personal HX of Atypical Moles:   Yes had a dysplastic nevus removed from the back 3 years ago; she thinks it was severely dysplastic. 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 Was on oncology unit prior to that. Has 2 children; 5 yo boy (Pacheco) and Mechelle (2)  On review of systems, there are no further skin complaints, patient is feeling otherwise well.  See patient intake sheet.  ROS of the following were done and are negative: Constitutional, Eyes, Ears, Nose,   Mouth, Throat, Cardiovascular, Respiratory, GI, Genitourinary, Musculoskeletal,   Psychiatric, Endocrine, Allergic/Immunologic.    PHYSICAL EXAM:   There were no vitals taken for this visit.  Skin exam performed as follows: Type 2 skin. Mood appropriate  Alert and Oriented X 3. Well developed, well nourished in no distress.  General appearance: Normal  Head including face: Normal  Eyes: conjunctiva and lids: Normal  Mouth: Lips, teeth, gums: Normal  Neck: Normal  Chest-breast/axillae: Normal  Back: Normal  Spleen and liver: Normal  Cardiovascular: Exam of peripheral vascular system by observation for swelling, varicosities, edema: Normal  Genitalia: groin, buttocks: Normal  Extremities: digits/nails (clubbing): Normal  Eccrine and Apocrine glands: Normal  Right upper extremity: Normal  Left upper extremity: Normal  Right lower extremity: Normal  Left lower extremity: Normal  Skin: Scalp and body hair: See below    Pt deferred exam of breasts, groin, buttocks: No    Other physical findings:  1. Multiple pigmented macules on  extremities and trunk  2. Multiple pigmented macules on face, trunk and extremities  3. Multiple vascular papules on trunk, arms and legs  4. 4 mm two toned macule on the left superior shoulder         Except as noted above, no other signs of skin cancer or melanoma.     ASSESSMENT/PLAN:   Benign Full skin cancer screening today. . Patient with history of dysplastic nevi  Advised on monthly self exams and 1 year  Patient Education: Appropriate brochures given.    Multiple benign appearing nevi on arms, legs and trunk. Discussed ABCDEs of melanoma and sunscreen.   Multiple lentigos on arms, legs and trunk. Advised benign, no treatment needed.  Multiple scattered angiomas. Advised benign, no treatment needed.   Atypical nevus on the left superior shoulder. Shave biopsy performed.  Area cleaned and anesthetized with 1% lidocaine with epinephrine.  Dermablade used to remove the lesion and sent to pathology. Bleeding was cauterized. Pt tolerated procedure well with no complications.           Follow-up: yearly FSE/PRN sooner    1.) Patient was asked about new and changing moles. YES  2.) Patient received a complete physical skin examination: YES  3.) Patient was counseled to perform a monthly self skin examination: YES  Scribed By: Brittany Hampton, MS, PA-C

## 2023-06-05 NOTE — LETTER
6/5/2023         RE: Venessa Guillen  [address]  [facility] [address]        Dear Colleague,    Thank you for referring your patient, Venessa Guillen, to the [facility]. Please see a copy of my visit note below.    HPI:   CC: Venessa Guillen is a 37 year old female who presents for Full skin cancer screening.  Last Skin Exam: 2 years ago      1st Baseline: no  Personal HX of Skin Cancer: no   Personal HX of Malignant Melanoma: no   Family HX of Skin Cancer / Malignant Melanoma: no  Personal HX of Atypical Moles:   Yes had a dysplastic nevus removed from the back 3 years ago; she thinks it was severely dysplastic. 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 Was on oncology unit prior to that. Has 2 children; 5 yo boy (Pacheco) and Mechelle (2)  On review of systems, there are no further skin complaints, patient is feeling otherwise well.  See patient intake sheet.  ROS of the following were done and are negative: Constitutional, Eyes, Ears, Nose,   Mouth, Throat, Cardiovascular, Respiratory, GI, Genitourinary, Musculoskeletal,   Psychiatric, Endocrine, Allergic/Immunologic.    PHYSICAL EXAM:   There were no vitals taken for this visit.  Skin exam performed as follows: Type 2 skin. Mood appropriate  Alert and Oriented X 3. Well developed, well nourished in no distress.  General appearance: Normal  Head including face: Normal  Eyes: conjunctiva and lids: Normal  Mouth: Lips, teeth, gums: Normal  Neck: Normal  Chest-breast/axillae: Normal  Back: Normal  Spleen and liver: Normal  Cardiovascular: Exam of peripheral vascular system by observation for swelling, varicosities, edema: Normal  Genitalia: groin, buttocks: Normal  Extremities: digits/nails (clubbing): Normal  Eccrine and Apocrine glands: Normal  Right upper extremity:  Normal  Left upper extremity: Normal  Right lower extremity: Normal  Left lower extremity: Normal  Skin: Scalp and body hair: See below    Pt deferred exam of breasts, groin, buttocks: No    Other physical findings:  1. Multiple pigmented macules on extremities and trunk  2. Multiple pigmented macules on face, trunk and extremities  3. Multiple vascular papules on trunk, arms and legs  4. 4 mm two toned macule on the left superior shoulder         Except as noted above, no other signs of skin cancer or melanoma.     ASSESSMENT/PLAN:   Benign Full skin cancer screening today. . Patient with history of dysplastic nevi  Advised on monthly self exams and 1 year  Patient Education: Appropriate brochures given.    Multiple benign appearing nevi on arms, legs and trunk. Discussed ABCDEs of melanoma and sunscreen.   Multiple lentigos on arms, legs and trunk. Advised benign, no treatment needed.  Multiple scattered angiomas. Advised benign, no treatment needed.   Atypical nevus on the left superior shoulder. Shave biopsy performed.  Area cleaned and anesthetized with 1% lidocaine with epinephrine.  Dermablade used to remove the lesion and sent to pathology. Bleeding was cauterized. Pt tolerated procedure well with no complications.           Follow-up: yearly FSE/PRN sooner    1.) Patient was asked about new and changing moles. YES  2.) Patient received a complete physical skin examination: YES  3.) Patient was counseled to perform a monthly self skin examination: YES  Scribed By: Brittany Hampton, MS, PAYESSENIA        Again, thank you for allowing me to participate in the care of your patient.        Sincerely,        Brittany Hampton PA-C

## 2023-06-05 NOTE — PATIENT INSTRUCTIONS
Wound Care Instructions     FOR SUPERFICIAL WOUNDS     [facility]  [phone]                 AFTER 24 HOURS YOU SHOULD REMOVE THE BANDAGE AND BEGIN DAILY DRESSING CHANGES AS FOLLOWS:     1) Remove Dressing.     2) Clean and dry the area with tap water using a Q-tip or sterile gauze pad.     3) Apply Vaseline, Aquaphor, Polysporin ointment or Bacitracin ointment over entire wound.  Do NOT use Neosporin ointment.     4) Cover the wound with a band-aid, or a sterile non-stick gauze pad and micropore paper tape    REPEAT THESE INSTRUCTIONS AT LEAST ONCE A DAY UNTIL THE WOUND HAS COMPLETELY HEALED.    It is an old wives tale that a wound heals better when it is exposed to air and allowed to dry out. The wound will heal faster with a better cosmetic result if it is kept moist with ointment and covered with a bandage.    **Do not let the wound dry out.**    Supplies Needed:      *Cotton tipped applicators (Q-tips)    *Vaseline, Aquaphor, Polysporin or Bacitracin Ointment (NOT NEOSPORIN)    *Band-aids or non-stick gauze pads and micropore paper tape.      PATIENT INFORMATION:    During the healing process you will notice a number of changes. All wounds develop a small halo of redness surrounding the wound.  This means healing is occurring. Severe itching with extensive redness usually indicates sensitivity to the ointment or bandage tape used to dress the wound.  You should call our office if this develops.      Swelling  and/or discoloration around your surgical site is common, particularly when performed around the eye.    All wounds normally drain.  The larger the wound the more drainage there will be.  After 7-10 days, you will notice the wound beginning to shrink and new skin will begin to grow.  The wound is healed when you can see skin has formed over the entire area.  A healed wound has a healthy, shiny look to the surface and is red to dark pink in color to normalize.  Wounds may  take approximately 4-6 weeks to heal.  Larger wounds may take 6-8 weeks.  After the wound is healed you may discontinue dressing changes.    You may experience a sensation of tightness as your wound heals. This is normal and will gradually subside.    Your healed wound may be sensitive to temperature changes. This sensitivity improves with time, but if you re having a lot of discomfort, try to avoid temperature extremes.    Patients frequently experience itching after their wound appears to have healed because of the continue healing under the skin.  Plain Vaseline will help relieve the itching.      POSSIBLE COMPLICATIONS    BLEEDING:    Leave the bandage in place.  Use tightly rolled up gauze or a cloth to apply direct pressure over the bandage for 30  minutes.  Reapply pressure for an additional 30 minutes if necessary  Use additional gauze and tape to maintain pressure once the bleeding has stopped.

## 2023-06-08 LAB
PATH REPORT.COMMENTS IMP SPEC: NORMAL
PATH REPORT.COMMENTS IMP SPEC: NORMAL
PATH REPORT.FINAL DX SPEC: NORMAL
PATH REPORT.GROSS SPEC: NORMAL
PATH REPORT.MICROSCOPIC SPEC OTHER STN: NORMAL
PATH REPORT.RELEVANT HX SPEC: NORMAL

## 2023-07-13 ENCOUNTER — LAB (OUTPATIENT)
Dept: LAB | Facility: CLINIC | Age: 38
End: 2023-07-13
Payer: COMMERCIAL

## 2023-07-13 DIAGNOSIS — E05.90 HYPERTHYROIDISM: ICD-10-CM

## 2023-07-13 DIAGNOSIS — E03.8 OTHER SPECIFIED HYPOTHYROIDISM: ICD-10-CM

## 2023-07-13 LAB
T4 FREE SERPL-MCNC: 1.18 NG/DL (ref 0.9–1.7)
TSH SERPL DL<=0.005 MIU/L-ACNC: 1.42 UIU/ML (ref 0.3–4.2)

## 2023-07-13 PROCEDURE — 84439 ASSAY OF FREE THYROXINE: CPT

## 2023-07-13 PROCEDURE — 36415 COLL VENOUS BLD VENIPUNCTURE: CPT

## 2023-07-13 PROCEDURE — 84443 ASSAY THYROID STIM HORMONE: CPT

## 2023-07-20 ENCOUNTER — HOSPITAL ENCOUNTER (EMERGENCY)
Facility: CLINIC | Age: 38
Discharge: HOME | End: 2023-07-20
Attending: PHYSICIAN ASSISTANT | Admitting: PHYSICIAN ASSISTANT
Payer: COMMERCIAL

## 2023-07-20 VITALS
OXYGEN SATURATION: 100 % | SYSTOLIC BLOOD PRESSURE: 116 MMHG | TEMPERATURE: 98.1 F | HEART RATE: 88 BPM | RESPIRATION RATE: 16 BRPM | DIASTOLIC BLOOD PRESSURE: 73 MMHG

## 2023-07-20 DIAGNOSIS — M79.10 MYALGIA: ICD-10-CM

## 2023-07-20 LAB
ANION GAP SERPL CALCULATED.3IONS-SCNC: 8 MMOL/L (ref 7–15)
BASOPHILS # BLD AUTO: 0.1 10E3/UL (ref 0–0.2)
BASOPHILS NFR BLD AUTO: 1 %
BUN SERPL-MCNC: 9.8 MG/DL (ref 6–20)
CALCIUM SERPL-MCNC: 9 MG/DL (ref 8.6–10)
CHLORIDE SERPL-SCNC: 103 MMOL/L (ref 98–107)
CREAT SERPL-MCNC: 0.72 MG/DL (ref 0.51–0.95)
DEPRECATED HCO3 PLAS-SCNC: 26 MMOL/L (ref 22–29)
EOSINOPHIL # BLD AUTO: 0.2 10E3/UL (ref 0–0.7)
EOSINOPHIL NFR BLD AUTO: 4 %
ERYTHROCYTE [DISTWIDTH] IN BLOOD BY AUTOMATED COUNT: 13.6 % (ref 10–15)
GFR SERPL CREATININE-BSD FRML MDRD: >90 ML/MIN/1.73M2
GLUCOSE SERPL-MCNC: 100 MG/DL (ref 70–99)
HCT VFR BLD AUTO: 38 % (ref 35–47)
HGB BLD-MCNC: 12.9 G/DL (ref 11.7–15.7)
IMM GRANULOCYTES # BLD: 0 10E3/UL
IMM GRANULOCYTES NFR BLD: 0 %
LYMPHOCYTES # BLD AUTO: 3 10E3/UL (ref 0.8–5.3)
LYMPHOCYTES NFR BLD AUTO: 54 %
MCH RBC QN AUTO: 29.3 PG (ref 26.5–33)
MCHC RBC AUTO-ENTMCNC: 33.9 G/DL (ref 31.5–36.5)
MCV RBC AUTO: 86 FL (ref 78–100)
MONOCYTES # BLD AUTO: 0.3 10E3/UL (ref 0–1.3)
MONOCYTES NFR BLD AUTO: 6 %
NEUTROPHILS # BLD AUTO: 2 10E3/UL (ref 1.6–8.3)
NEUTROPHILS NFR BLD AUTO: 35 %
NRBC # BLD AUTO: 0 10E3/UL
NRBC BLD AUTO-RTO: 0 /100
PLAT MORPH BLD: NORMAL
PLATELET # BLD AUTO: 230 10E3/UL (ref 150–450)
POTASSIUM SERPL-SCNC: 3.4 MMOL/L (ref 3.4–5.3)
RBC # BLD AUTO: 4.4 10E6/UL (ref 3.8–5.2)
RBC MORPH BLD: NORMAL
SODIUM SERPL-SCNC: 137 MMOL/L (ref 136–145)
WBC # BLD AUTO: 5.7 10E3/UL (ref 4–11)

## 2023-07-20 PROCEDURE — 87015 SPECIMEN INFECT AGNT CONCNTJ: CPT | Performed by: PHYSICIAN ASSISTANT

## 2023-07-20 PROCEDURE — 87484 EHRLICHA CHAFFEENSIS AMP PRB: CPT | Performed by: PHYSICIAN ASSISTANT

## 2023-07-20 PROCEDURE — 86618 LYME DISEASE ANTIBODY: CPT | Performed by: PHYSICIAN ASSISTANT

## 2023-07-20 PROCEDURE — 86753 PROTOZOA ANTIBODY NOS: CPT | Performed by: PHYSICIAN ASSISTANT

## 2023-07-20 PROCEDURE — G0463 HOSPITAL OUTPT CLINIC VISIT: HCPCS | Performed by: PHYSICIAN ASSISTANT

## 2023-07-20 PROCEDURE — 99213 OFFICE O/P EST LOW 20 MIN: CPT | Performed by: PHYSICIAN ASSISTANT

## 2023-07-20 PROCEDURE — 36415 COLL VENOUS BLD VENIPUNCTURE: CPT | Performed by: PHYSICIAN ASSISTANT

## 2023-07-20 PROCEDURE — 87798 DETECT AGENT NOS DNA AMP: CPT | Performed by: PHYSICIAN ASSISTANT

## 2023-07-20 PROCEDURE — 85025 COMPLETE CBC W/AUTO DIFF WBC: CPT | Performed by: PHYSICIAN ASSISTANT

## 2023-07-20 PROCEDURE — 80048 BASIC METABOLIC PNL TOTAL CA: CPT | Performed by: PHYSICIAN ASSISTANT

## 2023-07-20 PROCEDURE — 87207 SMEAR SPECIAL STAIN: CPT | Performed by: PHYSICIAN ASSISTANT

## 2023-07-20 RX ORDER — DOXYCYCLINE 100 MG/1
100 CAPSULE ORAL 2 TIMES DAILY
Qty: 28 CAPSULE | Refills: 0 | Status: SHIPPED | OUTPATIENT
Start: 2023-07-20 | End: 2023-07-26

## 2023-07-20 ASSESSMENT — ENCOUNTER SYMPTOMS
CARDIOVASCULAR NEGATIVE: 1
NEUROLOGICAL NEGATIVE: 1
ARTHRALGIAS: 1
GASTROINTESTINAL NEGATIVE: 1
RESPIRATORY NEGATIVE: 1
FEVER: 1
ACTIVITY CHANGE: 0
DIAPHORESIS: 0
APPETITE CHANGE: 0
MYALGIAS: 1
FATIGUE: 1
NECK PAIN: 0

## 2023-07-21 LAB
ANAPLASMA BLD MOD GIEMSA: NEGATIVE
B BURGDOR IGG+IGM SER QL: 0.71
B MICROTI BLD SMEAR: NEGATIVE
EHRLICHIA SPEC QL MICRO: NEGATIVE

## 2023-07-21 NOTE — RESULT ENCOUNTER NOTE
Final Parasite stain is NEGATIVE for Anaplasma phagocytophilum or Ehrlichia spp., and Babesia.  No change in Treatment per [facility] ED Lab Result Anaplasma phagocytophilium or Ehrlichia spp or Babesia protocol.

## 2023-07-21 NOTE — DISCHARGE INSTRUCTIONS
Doxycycline will cause the skin to be more sensitive to sunlight and increase your risk of sunburn.  Recommend using sunblock rated SPF 50 or greater if spending any length of time outside while taking doxycycline.    Return to clinic for further evaluation if you develop fever of 104 degrees F or greater, chest pain, difficulty breathing, palpitations, shortness of breath, difficulty swallowing, severe headache, worsening numbness or tingling or weakness, acutely worsening neck stiffness worsening joint aches or body aches dizziness or lightheadedness, acute vision changes, acutely worsening rash, severe abdominal pain, or uncontrolled nausea/vomiting.

## 2023-07-21 NOTE — ED PROVIDER NOTES
History     Chief Complaint   Patient presents with    Joint Pain     Pt here for lymes testing. Was having low grade temps off and on Saturday morning it was 102.1     HPI  Venessa Guillen is a 38 year old female with a past medical history of Wenckebach second-degree AV block, hypothyroidism and hypothyroidism related to pregnancy who presents due to concern for Lyme disease.  She has had symptoms of fevers, body aches, joint aches, and fatigue over the past 3 weeks.  Joint aches are especially felt in the elbows, has body aches felt in the arms and trunk. [place] but does not recall being bitten by tick.  No previous history of Lyme disease.  She presented to clinic for a lab appointment, thyroid labs were normal on July 13, 2023.  She is not currently taking any medication for thyroid disease.  Completed an at home pregnancy test over the past few days as well, result was negative.  Has and IUD in place.  Wants to know why she is having several nonspecific symptoms, wants to rule out Lyme disease today.  Denies chest pain or shortness of breath, no concerns of breathing or swallowing.  No fevers currently, however she did have fever up to 100.1 degrees F about 5 days ago.      Allergies:  Allergies   Allergen Reactions    Shellfish Allergy Anaphylaxis and Hives     Ok with topical iodine    Ceclor [Cefaclor]        Problem List:    Patient Active Problem List    Diagnosis Date Noted    Thyroid nodule 09/14/2022     Priority: Medium    Wenckebach second degree AV block 03/12/2021     Priority: Medium     Holter, March, 2021.       Hyperthyroidism 03/12/2021     Priority: Medium     March, 2021. Seen by Dr. Boone, Endocrinology.       Tachycardia 03/08/2021     Priority: Medium    Encounter for IUD insertion 12/15/2020     Priority: Medium     Placed 12/15/2020  Remove by 01/2025    NDC  72179-072-97    Lot   [id number]    Exp 01/2023          Multigravida of advanced maternal age in third  trimester 10/26/2020     Priority: Medium    CARDIOVASCULAR SCREENING; LDL GOAL LESS THAN 160 10/31/2010     Priority: Medium        Past Medical History:    Past Medical History:   Diagnosis Date    Chickenpox        Past Surgical History:    Past Surgical History:   Procedure Laterality Date    BIOPSY OF SKIN LESION      mole removal    COLONOSCOPY      x2    MOUTH SURGERY      wisdom teeth       Family History:    Family History   Problem Relation Age of Onset    Substance Abuse Father         recovered A&D    Hypertension Maternal Grandmother     Prostate Cancer Maternal Grandfather     Skin Cancer Maternal Grandfather         BCC    Diabetes Maternal Grandfather     Heart Surgery Maternal Grandfather         bypass    Lung Cancer Paternal Grandmother     Diabetes Paternal Grandmother     Heart Surgery Paternal Grandmother         bypass    Prostate Cancer Paternal Grandfather        Social History:  Marital Status:   [2]  Social History     Tobacco Use    Smoking status: Never    Smokeless tobacco: Never   Vaping Use    Vaping Use: Never used   Substance Use Topics    Alcohol use: Yes     Comment: Minimal    Drug use: No        Medications:    doxycycline hyclate (VIBRAMYCIN) 100 MG capsule  acetaminophen (TYLENOL) 500 MG tablet  fexofenadine-pseudoePHEDrine (ALLEGRA-D 24) 180-240 MG 24 hr tablet  fluticasone (FLONASE) 50 MCG/ACT nasal spray  levonorgestrel (MIRENA) 20 MCG/24HR IUD  Prenatal MV & Min w/FA-DHA (PRENATAL ADULT GUMMY/DHA/FA PO)        Review of Systems   Constitutional:  Positive for fatigue and fever. Negative for activity change, appetite change and diaphoresis.   HENT: Negative.     Respiratory: Negative.     Cardiovascular: Negative.    Gastrointestinal: Negative.    Genitourinary: Negative.    Musculoskeletal:  Positive for arthralgias and myalgias. Negative for gait problem and neck pain.   Neurological: Negative.        Physical Exam   BP: 116/73  Pulse: 88  Temp: 98.1  F (36.7   C)  Resp: 16  SpO2: 100 %      Physical Exam  Constitutional:       General: She is not in acute distress.     Appearance: Normal appearance. She is not ill-appearing, toxic-appearing or diaphoretic.   HENT:      Head: Normocephalic and atraumatic.      Right Ear: No middle ear effusion. No mastoid tenderness. Tympanic membrane is not injected, perforated, erythematous, retracted or bulging.      Left Ear:  No middle ear effusion. No mastoid tenderness. Tympanic membrane is not injected, perforated, erythematous, retracted or bulging.      Mouth/Throat:      Mouth: Mucous membranes are moist.      Pharynx: No oropharyngeal exudate or posterior oropharyngeal erythema.   Cardiovascular:      Rate and Rhythm: Normal rate and regular rhythm.      Pulses: Normal pulses.      Heart sounds: Normal heart sounds. No murmur heard.  Pulmonary:      Effort: Pulmonary effort is normal. No respiratory distress.      Breath sounds: Normal breath sounds. No stridor. No wheezing, rhonchi or rales.   Chest:      Chest wall: No tenderness.   Abdominal:      General: Abdomen is flat. Bowel sounds are normal. There is no distension.      Palpations: Abdomen is soft.      Tenderness: There is no abdominal tenderness. There is no right CVA tenderness, left CVA tenderness, guarding or rebound.   Musculoskeletal:      Cervical back: Neck supple. No rigidity. No muscular tenderness.   Lymphadenopathy:      Cervical: No cervical adenopathy.      Right cervical: No superficial, deep or posterior cervical adenopathy.     Left cervical: No superficial, deep or posterior cervical adenopathy.   Neurological:      Mental Status: She is alert and oriented to person, place, and time.      Sensory: No sensory deficit.         ED Course                 Procedures    No results found for this or any previous visit (from the past 24 hour(s)).    Medications - No data to display    Assessments & Plan (with Medical Decision Making)     The patient is a  38-year-old female who presents for evaluation of symptoms suggestive of Lyme disease such as body aches, joint aches and fevers over the past 3 weeks.  Tick panel ordered.  Starting doxycycline due to concern for Lyme disease.  Advised her to discontinue doxycycline if all tests come back negative.    Doxycycline will cause the skin to be more sensitive to sunlight and increase your risk of sunburn.  Recommend using sunblock rated SPF 50 or greater if spending any length of time outside while taking doxycycline.    Return to clinic for further evaluation if you develop fever of 104 degrees F or greater, chest pain, difficulty breathing, palpitations, shortness of breath, difficulty swallowing, severe headache, worsening numbness or tingling or weakness, acutely worsening neck stiffness worsening joint aches or body aches dizziness or lightheadedness, acute vision changes, acutely worsening rash, severe abdominal pain, or uncontrolled nausea/vomiting.    I have reviewed the nursing notes.    I have reviewed the findings, diagnosis, plan and need for follow up with the patient.      New Prescriptions    DOXYCYCLINE HYCLATE (VIBRAMYCIN) 100 MG CAPSULE    Take 1 capsule (100 mg) by mouth 2 times daily for 14 days       Final diagnoses:   Myalgia       7/20/2023   [facility] EMERGENCY DEPT       Ananth Welsh PA-C  07/20/23 2054

## 2023-07-24 ENCOUNTER — MYC MEDICAL ADVICE (OUTPATIENT)
Dept: FAMILY MEDICINE | Facility: CLINIC | Age: 38
End: 2023-07-24
Payer: COMMERCIAL

## 2023-07-24 DIAGNOSIS — B60.00 BABESIOSIS: Primary | ICD-10-CM

## 2023-07-24 LAB
A PHAGOCYTOPH DNA BLD QL NAA+PROBE: NOT DETECTED
B MICROTI DNA BLD QL NAA+PROBE: NOT DETECTED
BABESIA DNA BLD QL NAA+PROBE: NOT DETECTED
E CHAFFEENSIS DNA BLD QL NAA+PROBE: NOT DETECTED
E EWINGII DNA SPEC QL NAA+PROBE: NOT DETECTED
EHRLICHIA DNA SPEC QL NAA+PROBE: NOT DETECTED

## 2023-07-24 NOTE — TELEPHONE ENCOUNTER
Charlene  You have never seen this pt however her ED f/u is with you next week.   See mychart ?      Benedicto Chu RN

## 2023-07-25 LAB
B MICROTI IGG TITR SER: ABNORMAL {TITER}
B MICROTI IGM TITR SER: ABNORMAL {TITER}

## 2023-07-25 NOTE — RESULT ENCOUNTER NOTE
Final result for Babesia Species by PCR is [NEGATIVE] and Babesia Microtic by PCR is [NEGATIVE].  No change in treatment per [facility] Lab Result Babesia protocol.

## 2023-07-25 NOTE — RESULT ENCOUNTER NOTE
Final result for Ehrlichia Anaplasma sp by PCR is NEGATIVE for A. phagocytophilium, E. Chaffeenis, E. ewingii/canis and E. muris-like.  No change in treatment per [facility] ED lab result Anaplasmosis/Ehrlichiosis protocol.

## 2023-07-26 RX ORDER — AZITHROMYCIN 250 MG/1
TABLET, FILM COATED ORAL
Qty: 8 TABLET | Refills: 0 | Status: SHIPPED | OUTPATIENT
Start: 2023-07-26 | End: 2023-08-02

## 2023-07-26 RX ORDER — ATOVAQUONE 750 MG/5ML
750 SUSPENSION ORAL EVERY 12 HOURS
Qty: 70 ML | Refills: 0 | Status: SHIPPED | OUTPATIENT
Start: 2023-07-26 | End: 2023-08-02

## 2023-07-26 NOTE — RESULT ENCOUNTER NOTE
See  MyChart message from PCP, Charlene Curiel APRN CNP. At 10:28AM on 7/26/23.    Note from PCP copied and pasted below:  I discontinued Doxycycline, recommend to start Azithromycin, take 500 mg on the first day then 250 mg daily for 6 more days (total 7 days), plus start  Atovaquone 750 mg (5 ml) twice daily, take this medication with fatty meal preferably. Please schedule lab only appointment in 7 days, or after treatment to repeat blood parasitology smear.

## 2023-07-26 NOTE — RESULT ENCOUNTER NOTE
Final Babesia IgG is [NEGATIVE] and Babesia IgM is [POSITIVE].   [facility] Emergency Dept discharge antibiotic prescribed: Doxycycline 100 mg PO tablet, 1 tablet (100 mg) by mouth 2 times daily for 14 days  Recommendations in Treatment per [facility] ED Lab Result Babesia protocol.

## 2023-08-03 ENCOUNTER — LAB (OUTPATIENT)
Dept: LAB | Facility: CLINIC | Age: 38
End: 2023-08-03
Payer: COMMERCIAL

## 2023-08-03 ENCOUNTER — MYC MEDICAL ADVICE (OUTPATIENT)
Dept: FAMILY MEDICINE | Facility: CLINIC | Age: 38
End: 2023-08-03

## 2023-08-03 DIAGNOSIS — B60.00 BABESIOSIS: ICD-10-CM

## 2023-08-03 DIAGNOSIS — R52 BODY ACHES: Primary | ICD-10-CM

## 2023-08-03 PROCEDURE — 36415 COLL VENOUS BLD VENIPUNCTURE: CPT

## 2023-08-04 LAB
ANAPLASMA BLD MOD GIEMSA: NEGATIVE
B MICROTI BLD SMEAR: NEGATIVE
EHRLICHIA SPEC QL MICRO: NEGATIVE

## 2023-08-21 ENCOUNTER — LAB (OUTPATIENT)
Dept: LAB | Facility: CLINIC | Age: 38
End: 2023-08-21
Payer: COMMERCIAL

## 2023-08-21 DIAGNOSIS — R52 BODY ACHES: ICD-10-CM

## 2023-08-21 PROCEDURE — 86618 LYME DISEASE ANTIBODY: CPT

## 2023-08-21 PROCEDURE — 36415 COLL VENOUS BLD VENIPUNCTURE: CPT

## 2023-08-22 LAB — B BURGDOR IGG+IGM SER QL: 0.31

## 2023-10-11 ENCOUNTER — MYC MEDICAL ADVICE (OUTPATIENT)
Dept: ENDOCRINOLOGY | Facility: CLINIC | Age: 38
End: 2023-10-11
Payer: COMMERCIAL

## 2023-10-11 DIAGNOSIS — E04.1 THYROID NODULE: Primary | ICD-10-CM

## 2023-10-11 NOTE — TELEPHONE ENCOUNTER
Pt advised to make follow up appt and that she will need to find a new Endo provider. What labs would need to be done prior to appt if she is able to get in with [place]?  Angi HERNÁNDEZ RN BSN PHN  Specialty Clinics

## 2023-10-30 ENCOUNTER — LAB (OUTPATIENT)
Dept: LAB | Facility: CLINIC | Age: 38
End: 2023-10-30
Payer: COMMERCIAL

## 2023-10-30 DIAGNOSIS — E04.1 THYROID NODULE: ICD-10-CM

## 2023-10-30 LAB — TSH SERPL DL<=0.005 MIU/L-ACNC: 1.88 UIU/ML (ref 0.3–4.2)

## 2023-10-30 PROCEDURE — 36415 COLL VENOUS BLD VENIPUNCTURE: CPT

## 2023-10-30 PROCEDURE — 84443 ASSAY THYROID STIM HORMONE: CPT

## 2023-11-01 ENCOUNTER — VIRTUAL VISIT (OUTPATIENT)
Dept: ENDOCRINOLOGY | Facility: CLINIC | Age: 38
End: 2023-11-01
Payer: COMMERCIAL

## 2023-11-01 PROCEDURE — 99213 OFFICE O/P EST LOW 20 MIN: CPT | Mod: VID | Performed by: INTERNAL MEDICINE

## 2023-11-01 NOTE — LETTER
11/1/2023         RE: Venessa Guillen  [address]  [place] [address]        Dear Colleague,    Thank you for referring your patient, Venessa Guillen, to the [facility] ENDOCRINOLOGY. Please see a copy of my visit note below.    Venessa is a 38 year old who is being evaluated via a billable video visit.      How would you like to obtain your AVS? MyChart  If the video visit is dropped, the invitation should be resent by: Text to cell phone: [phone]  Will anyone else be joining your video visit? No      Endocrine Consult note    Attending Assessment/Plan :     Postpartum thyroiditis    -TPO positive  -TSH remains WNL off levothyroxine  -likely that she had postpartum thyroiditis with temporary hypothyroid phase that has now resolved  -would recommend yearly TSH with PCP in case of progression to hashimoto hypothyroidism given positive TPO    #Thyroid nodules  -2 nodules, 1 on left and 1 on the isthmus  -Neither currently meet biopsy criteria  -can consider repeat ultrasound in 3-5 years or if noticeable size change with PCP     RTC prn    I have independently reviewed and interpreted labs, imaging as indicated.      Chief complaint:  Venessa is a 38 year old female seen for follow-up on postpartum thyroiditis.    HISTORY OF PRESENT ILLNESS    Venessa is a 38-year-old female.  She comes to clinic for follow-up on hypothyroidism.    Last visit:  She was last seen in October 2021.  At that time she reported that the hyperthyroidism was found on a work-up for palpitations that had begun 2 to 3 weeks before that visit.  This is postpartum and delivery of her second child.   She had also endorsed tremors and muscle weakness.  She also had 8 pound weight loss.  Also the time last visit she was breast-feeding.  It was noted that she did not have any history of thyroid disease prior.  She did not require any antithyroid medication at this time.    Following this period of time, she then went  hypothyroid.  She was started on 50 mcg levothyroxine.  She is currently on this dose with TSH around 1.0.  She denies signs or symptoms of hypothyroidism.  She reports that she was not symptomatic of hypothyroidism at the time of start, postauricular TSH was above 10 at the time.    Today - had babesiosis recently but is doing better now.  Still off levothyroxine and asymptomatic.      Endocrine relevant labs are as follows:  Component      Latest Ref Rng & Units 5/3/2021 6/3/2021 8/30/2021 10/18/2021   T4 Free      0.76 - 1.46 ng/dL 0.72 (L)      TSH      0.40 - 4.00 mU/L 10.71 (H) 3.52 1.17 0.65     Component      Latest Ref Rng & Units 11/15/2021 12/27/2021 3/9/2022   T4 Free      0.76 - 1.46 ng/dL      TSH      0.40 - 4.00 mU/L 1.32 1.25 1.05     Component      Latest Ref Rng & Units 3/11/2021   Thyroid Stim Immunog      <=1.3 TSI index <1.0       REVIEW OF SYSTEMS    10 system ROS otherwise as per the HPI or negative    Past Medical History  Past Medical History:   Diagnosis Date     Chickenpox        Medications  Current Outpatient Medications   Medication Sig Dispense Refill     acetaminophen (TYLENOL) 500 MG tablet Take 2 tablets (1,000 mg) by mouth every 6 hours as needed for mild pain 100 tablet 0     levonorgestrel (MIRENA) 20 MCG/24HR IUD 1 each (20 mcg) by Intrauterine route once       Prenatal MV & Min w/FA-DHA (PRENATAL ADULT GUMMY/DHA/FA PO) Take 2 chew tab by mouth daily        fexofenadine-pseudoePHEDrine (ALLEGRA-D 24) 180-240 MG 24 hr tablet Take 1 tablet by mouth daily (Patient not taking: Reported on 3/1/2023) 14 tablet 0     fluticasone (FLONASE) 50 MCG/ACT nasal spray Spray 1 spray into both nostrils daily (Patient not taking: Reported on 3/1/2023) 16 g 1       Allergies  Allergies   Allergen Reactions     Shellfish Allergy Anaphylaxis and Hives     Ok with topical iodine     Ceclor [Cefaclor]          Family History  family history includes Diabetes in her maternal grandfather and paternal  grandmother; Heart Surgery in her maternal grandfather and paternal grandmother; Hypertension in her maternal grandmother; Lung Cancer in her paternal grandmother; Prostate Cancer in her maternal grandfather and paternal grandfather; Skin Cancer in her maternal grandfather; Substance Abuse in her father.    Social History  Social History     Tobacco Use     Smoking status: Never     Smokeless tobacco: Never   Vaping Use     Vaping Use: Never used   Substance Use Topics     Alcohol use: Yes     Comment: Minimal     Drug use: No       Physical Exam  There is no height or weight on file to calculate BMI.  GENERAL: no distress  SKIN: Visible skin clear. No significant rash, abnormal pigmentation or lesions.  EYES: Eyes grossly normal to inspection.  No discharge or erythema, or obvious scleral/conjunctival abnormalities.  NECK: visible goiter is not present; no visible neck masses  RESP: No audible wheeze, cough, or visible cyanosis.  No visible retractions or increased work of breathing.    NEURO: Awake, alert, responds appropriately to questions.  Mentation and speech fluent.  PSYCH:affect normal and appearance well-groomed.    Video-Visit Details    Type of service:  Video Visit    Video Start Time (time video started): 1530    Video End Time (time video stopped): 1545    Originating Location (pt. Location): Home      Distant Location (provider location):  On-site    Mode of Communication:  Video Conference via [facility]    Physician has received verbal consent for a Video Visit from the patient? Yes    I spent a total of 21 minutes on the day of this established patient visit on chart review, lab review, coordinating care, exam, and counseling of patient    Alex Mcgee MD          Again, thank you for allowing me to participate in the care of your patient.        Sincerely,        Alex Mcgee MD

## 2024-02-22 ENCOUNTER — OFFICE VISIT (OUTPATIENT)
Dept: FAMILY MEDICINE | Facility: CLINIC | Age: 39
End: 2024-02-22
Payer: COMMERCIAL

## 2024-02-22 ENCOUNTER — TELEPHONE (OUTPATIENT)
Dept: GASTROENTEROLOGY | Facility: CLINIC | Age: 39
End: 2024-02-22

## 2024-02-22 VITALS
DIASTOLIC BLOOD PRESSURE: 74 MMHG | WEIGHT: 139.4 LBS | RESPIRATION RATE: 18 BRPM | TEMPERATURE: 98 F | HEIGHT: 65 IN | HEART RATE: 68 BPM | BODY MASS INDEX: 23.22 KG/M2 | OXYGEN SATURATION: 99 % | SYSTOLIC BLOOD PRESSURE: 118 MMHG

## 2024-02-22 DIAGNOSIS — R10.32 ABDOMINAL PAIN, LEFT LOWER QUADRANT: ICD-10-CM

## 2024-02-22 DIAGNOSIS — K92.1 BLOOD IN STOOL: Primary | ICD-10-CM

## 2024-02-22 PROCEDURE — 99213 OFFICE O/P EST LOW 20 MIN: CPT | Performed by: FAMILY MEDICINE

## 2024-02-22 ASSESSMENT — PAIN SCALES - GENERAL: PAINLEVEL: NO PAIN (0)

## 2024-02-22 NOTE — PROGRESS NOTES
Assessment & Plan   Patient is a 38-year-old female presenting to the clinic today for severe abdominal pain.  She reports about 15 years ago she had a similar episode and she had a colonoscopy at the time and everything seemed fine.  Whenever she has the severe abdominal pain she has blood in her stool which has happened sporadically over the years.   In November she had 3 episodes of the same severe abdominal pain with blood in her stool.  She has not had any other symptoms since then.  She is wondering if she needs another colonoscopy.  She does not have a history of Crohn's disease or ulcerative colitis in the family.  She denies any systemic symptoms like weight loss or night sweats.  She is otherwise very healthy.  We discussed this at length and I recommended that she repeat the colonoscopy.  If her colonoscopy is normal and her symptoms do not come back we will do a watch and wait approach if it comes back she may need to see a GI specialist at that point.    (K92.1) Blood in stool  (primary encounter diagnosis)  Comment: Colonoscopy ordered  Plan: Adult GI  Referral - Procedure Only            (R10.32) Abdominal pain, left lower quadrant  Comment: Colonoscopy ordered  Plan: Adult GI  Referral - Procedure Only                  FUTURE APPOINTMENTS:       - Follow-up visit as needed.    Melchor Clifford is a 38 year old, presenting for the following health issues:  Abdominal Pain        2/22/2024    12:04 PM   Additional Questions   Roomed by Bhavani SINGH LPN   Accompanied by self         2/22/2024    12:04 PM   Patient Reported Additional Medications   Patient reports taking the following new medications no new meds     History of Present Illness       Reason for visit:  Abdominal pain  Symptom onset:  More than a month  Symptoms include:  More frequent episodes of Severe abdominal cramping,  Symptom intensity:  Severe  Symptom progression:  Improving  Had these symptoms before:  Yes  Has  tried/received treatment for these symptoms:  Yes  Previous treatment was successful:  No    She eats 2-3 servings of fruits and vegetables daily.She consumes 2 sweetened beverage(s) daily.She exercises with enough effort to increase her heart rate 20 to 29 minutes per day.  She exercises with enough effort to increase her heart rate 4 days per week.   She is taking medications regularly.         Pain History:  When did you first notice your pain? A long time, years and years but has been getting worse, happened 3 times in November    Have you seen this provider for your pain in the past? No   Where in your body do your have pain? Lower abdomen, really bad cramps, with multiple BMs  Are you seeing anyone else for your pain? No  What makes your pain better? TUMS, going to the bathroom, time  What makes your pain worse? Took citrus and carbonation out of her diet and hasn't seen any difference   How has pain affected your ability to work? 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  and 2 kids        {Rooming staff  Please complete the PEG Assessment        2/22/2024    12:08 PM   PEG Score   PEG Total Score 0       Chronic Pain - Initial Assessment:    How would you describe your pain? Severe   Have you had any recent changes to the severity or character of your pain? No  Is there an underlying cause that has been identified? No  Has your ability to work or do daily activities changed recently because of your pain? No  Which of these pain treatments have you tried? Other: resolves on its own  Previous medication treatments:        Review of Systems  CONSTITUTIONAL: NEGATIVE for fever, chills, change in weight  INTEGUMENTARY/SKIN: NEGATIVE for worrisome rashes, moles or lesions  ENT/MOUTH: NEGATIVE for ear, mouth and throat problems  RESP: NEGATIVE for significant cough or SOB  CV: NEGATIVE for chest pain, palpitations or  "peripheral edema  GI: NEGATIVE for nausea, , heartburn, or change in bowel habits - positive for abdominal pain and blood in stool  : normal menstrual cycles  MUSCULOSKELETAL: NEGATIVE for significant arthralgias or myalgia  NEURO: NEGATIVE for weakness, dizziness or paresthesias  ENDOCRINE: NEGATIVE for temperature intolerance, skin/hair changes  HEME/ALLERGY/IMMUNE: NEGATIVE for bleeding problems  PSYCHIATRIC: NEGATIVE for changes in mood or affect      Objective    /74 (BP Location: Right arm, Patient Position: Sitting, Cuff Size: Adult Regular)   Pulse 68   Temp 98  F (36.7  C) (Tympanic)   Resp 18   Ht 1.662 m (5' 5.43\")   Wt 63.2 kg (139 lb 6.4 oz)   LMP  (LMP Unknown)   SpO2 99%   Breastfeeding No   BMI 22.89 kg/m    Body mass index is 22.89 kg/m .  Physical Exam   GENERAL: alert and no distress  EYES: Eyes grossly normal to inspection, PERRL and conjunctivae and sclerae normal  HENT: ear canals and TM's normal, nose and mouth without ulcers or lesions  NECK: no adenopathy, no asymmetry, masses, or scars  RESP: lungs clear to auscultation - no rales, rhonchi or wheezes  CV: regular rate and rhythm, normal S1 S2, no S3 or S4, no murmur, click or rub, no peripheral edema  ABDOMEN: tenderness LLQ and bowel sounds normal  MS: no gross musculoskeletal defects noted, no edema  SKIN: no suspicious lesions or rashes    No labs        Signed Electronically by: Sivan Goodwin MD    "

## 2024-02-22 NOTE — TELEPHONE ENCOUNTER
"Endoscopy Scheduling Screen    Have you had a positive Covid test in the last 14 days?  No    Are you active on MyChart?   Yes    What insurance is in the chart?  Other:  [facility]    Ordering/Referring Provider: Sivan Goodwin MD    (If ordering provider performs procedure, schedule with ordering provider unless otherwise instructed. )    BMI: Estimated body mass index is 22.89 kg/m  as calculated from the following:    Height as of an earlier encounter on 2/22/24: 1.662 m (5' 5.43\").    Weight as of an earlier encounter on 2/22/24: 63.2 kg (139 lb 6.4 oz).     Sedation Ordered  moderate sedation.   If patient BMI > 50 do not schedule in [facility].    If patient BMI > 45 do not schedule at [facility].    Are you taking methadone or Suboxone?  No    Have you had difficulties, pain, or discomfort during past endoscopy procedures?  No    Are you taking any prescription medications for pain 3 or more times per week?   NO - No RN review required.    Do you have a history of malignant hyperthermia or adverse reaction to anesthesia?  No    (Females) Are you currently pregnant?   No     Have you been diagnosed or told you have pulmonary hypertension?   No    Do you have an LVAD?  No    Have you been told you have moderate to severe sleep apnea?  No    Have you been told you have COPD, asthma, or any other lung disease?  No    Do you have any heart conditions?  No     Have you ever had an organ transplant?   No    Have you ever had or are you awaiting a heart or lung transplant?   No    Have you had a stroke or transient ischemic attack (TIA aka \"mini stroke\" in the last 6 months?   No    Have you been diagnosed with or been told you have cirrhosis of the liver?   No    Are you currently on dialysis?   No    Do you need assistance transferring?   No    BMI: Estimated body mass index is 22.89 kg/m  as calculated from the following:    Height as of an earlier encounter on 2/22/24: 1.662 m (5' 5.43\").    Weight as of an earlier " encounter on 2/22/24: 63.2 kg (139 lb 6.4 oz).     Is patients BMI > 40 and scheduling location [facility]?  No    Do you take an injectable medication for weight loss or diabetes (excluding insulin)?  No    Do you take the medication Naltrexone?  No    Do you take blood thinners?  No       Prep   Are you currently on dialysis or do you have chronic kidney disease?  No    Do you have a diagnosis of diabetes?  No    Do you have a diagnosis of cystic fibrosis (CF)?  No    On a regular basis do you go 3 -5 days between bowel movements?  Yes (Extended Prep)    BMI > 40?  No    Preferred Pharmacy:    [facility] [place], [place] - [address] [facility]  [address] [facility] [address]  Phone: [phone] Fax: [phone] Alternate Fax: [phone], [phone]      Final Scheduling Details   Colonoscopy prep sent?  N/A    Procedure scheduled  Colonoscopy    Surgeon:  Balbir     Date of procedure:  04/25/2024     Pre-OP / PAC:   No - Not required for this site.    Location  [facility] - Patient preference.    Sedation   Moderate Sedation - Per order.      Patient Reminders:   You will receive a call from a Nurse to review instructions and health history.  This assessment must be completed prior to your procedure.  Failure to complete the Nurse assessment may result in the procedure being cancelled.      On the day of your procedure, please designate an adult(s) who can drive you home stay with you for the next 24 hours. The medicines used in the exam will make you sleepy. You will not be able to drive.      You cannot take public transportation, ride share services, or non-medical taxi service without a responsible caregiver.  Medical transport services are allowed with the requirement that a responsible caregiver will receive you at your destination.  We require that drivers and caregivers are confirmed prior to your procedure.

## 2024-03-13 ENCOUNTER — TELEPHONE (OUTPATIENT)
Dept: GASTROENTEROLOGY | Facility: CLINIC | Age: 39
End: 2024-03-13
Payer: COMMERCIAL

## 2024-03-13 NOTE — TELEPHONE ENCOUNTER
Caller: Venessa    Reason for Reschedule/Cancellation   (please be detailed, any staff messages or encounters to note?): Patient      Prior to reschedule please review:  Ordering Provider: Christa Benedict Determined: CS  Does patient have any ASC Exclusions, please identify?: N      Notes on Cancelled Procedure:  Procedure: Lower Endoscopy [Colonoscopy]   Date: 4/25/24  Location: [facility]; [address] [address], [place] [address]  Surgeon: Balbir      Rescheduled: Yes,   Procedure: Lower Endoscopy [Colonoscopy]    Date: 6/5/24   Location: [facility]; [facility] [place] [address]    Surgeon: Balbir   Sedation Level Scheduled  CS ,  Reason for Sedation Level Protocol   Instructions updated and sent: Yes     Does patient need PAC or Pre -Op Rescheduled? : N       Did you cancel or rescheduled an EUS procedure? No.

## 2024-04-16 ENCOUNTER — HOSPITAL ENCOUNTER (OUTPATIENT)
Dept: CARDIOLOGY | Facility: CLINIC | Age: 39
Discharge: HOME | End: 2024-04-16
Attending: INTERNAL MEDICINE | Admitting: INTERNAL MEDICINE
Payer: COMMERCIAL

## 2024-04-16 DIAGNOSIS — R00.2 PALPITATIONS: ICD-10-CM

## 2024-04-16 PROCEDURE — 93225 XTRNL ECG REC<48 HRS REC: CPT

## 2024-04-16 PROCEDURE — 93227 XTRNL ECG REC<48 HR R&I: CPT | Performed by: INTERNAL MEDICINE

## 2024-04-26 ENCOUNTER — VIRTUAL VISIT (OUTPATIENT)
Dept: CARDIOLOGY | Facility: CLINIC | Age: 39
End: 2024-04-26
Payer: COMMERCIAL

## 2024-04-26 VITALS — WEIGHT: 135 LBS | HEIGHT: 65 IN | BODY MASS INDEX: 22.49 KG/M2

## 2024-04-26 DIAGNOSIS — R00.2 PALPITATIONS: Primary | ICD-10-CM

## 2024-04-26 PROCEDURE — 99213 OFFICE O/P EST LOW 20 MIN: CPT | Mod: 95 | Performed by: INTERNAL MEDICINE

## 2024-04-26 NOTE — LETTER
4/26/2024    [facility]  [address] [facility] [address]    RE: Venessa M Wilmer       Dear Colleague,     I had the pleasure of seeing Venessa Guillen in the [facility].  Venessa is a 38 year old who is being evaluated via a billable video visit.    How would you like to obtain your AVS? MyChart  If the video visit is dropped, the invitation should be resent by: Text to cell phone: [phone]  Will anyone else be joining your video visit? No    Video-Visit Details    Type of service:  Video Visit   Originating Location (pt. Location): Home    Distant Location (provider location):  On-site  Platform used for Video Visit: SwapMob     This clinic visit was performed via telephone/video .    Today, I had the pleasure of connecting with Venessa Guillen for a follow-up visit.  She is a pleasant 38 year old patient who presents to the clinic for follow-up visit.  I previously seen the patient 24 months ago in consultation for palpitations.  Rhythm monitor at that time it showed occasional PVCs and a few episodes of Mobitz type I AV block during sleep.  We decided to hold off on doing any medications and schedule her for a visit today with a  repeat holter.  She also underwent a Holter monitor prior to today's visit which shows few episodes of ectopic beats but no worrisome arrhythmias.  Occasional Mobitz type I AV block was again noted.  Today, she tells me that she has been feeling extremely well and does not have any complaints.  She occasionally feels palpitations which subside within a couple of seconds.  She otherwise denies chest pain, shortness of breath, chest tightness, lightheadedness, presyncope or syncope.      Assessment:  1.  Palpitations-secondary to rare PVCs  2.  Rare episode of Mobitz type I AV block during sleep  3.  Postpartum thyroiditis    The patient is currently doing well and does not have any significant complaints.  I do not believe she needs to be on AV  zonia blocking agents at this time as the episodes of ectopic beats are rare and she is not symptomatic.  I will have her follow up with PCP and see us on as needed basis.  The lowest heart rate recorded on Holter was 44 and I told her that it is normal to have low heart rate during sleep and not to be worried about it.  We also talked about the fact that vagal tone is high during sleep and we often see bradycardia/pauses/benign form of AV block during sleep and it is quite possible that A-V block may never progressed to a more serious one.  She understands.    I spent 35 minutes taking care of the patient.  I reviewed her EKG, both rhythm monitors, echocardiogram, medications and blood work.    Plan:   1.  Follow-up with your PCP.    Thank you for allowing me to participate in the care of Venessa Guillen    This note was completed in part using Dragon voice recognition software. Although reviewed after completion, some word and grammatical errors may occur.    Nik Pineda MD  Cardiology    No orders of the defined types were placed in this encounter.      No orders of the defined types were placed in this encounter.      There are no discontinued medications.    No diagnosis found.    CURRENT MEDICATIONS:  Current Outpatient Medications   Medication Sig Dispense Refill    acetaminophen (TYLENOL) 500 MG tablet Take 2 tablets (1,000 mg) by mouth every 6 hours as needed for mild pain 100 tablet 0    levonorgestrel (MIRENA) 20 MCG/24HR IUD 1 each (20 mcg) by Intrauterine route once      Prenatal MV & Min w/FA-DHA (PRENATAL ADULT GUMMY/DHA/FA PO) Take 2 chew tab by mouth daily          ALLERGIES     Allergies   Allergen Reactions    Shellfish Allergy Anaphylaxis and Hives     Ok with topical iodine    Ceclor [Cefaclor]        PAST MEDICAL HISTORY:  Past Medical History:   Diagnosis Date    Chickenpox        PAST SURGICAL HISTORY:  Past Surgical History:   Procedure Laterality Date    BIOPSY OF SKIN LESION      mole  removal    COLONOSCOPY      x2    MOUTH SURGERY      wisdom teeth       FAMILY HISTORY:  Family History   Problem Relation Age of Onset    Substance Abuse Father         recovered A&D    Hypertension Maternal Grandmother     Prostate Cancer Maternal Grandfather     Skin Cancer Maternal Grandfather         BCC    Diabetes Maternal Grandfather     Heart Surgery Maternal Grandfather         bypass    Lung Cancer Paternal Grandmother     Diabetes Paternal Grandmother     Heart Surgery Paternal Grandmother         bypass    Prostate Cancer Paternal Grandfather        SOCIAL HISTORY:  Social History     Socioeconomic History    Marital status:      Spouse name: None    Number of children: None    Years of education: None    Highest education level: None   Tobacco Use    Smoking status: Never    Smokeless tobacco: Never   Vaping Use    Vaping status: Never Used   Substance and Sexual Activity    Alcohol use: Yes     Comment: Minimal    Drug use: No    Sexual activity: Yes     Partners: Male     Birth control/protection: I.U.D.     Comment:  since 2016   Other Topics Concern    Parent/sibling w/ CABG, MI or angioplasty before 65F 55M? No     Social Determinants of Health     Interpersonal Safety: Low Risk  (2/22/2024)    Interpersonal Safety     Do you feel physically and emotionally safe where you currently live?: Yes     Within the past 12 months, have you been hit, slapped, kicked or otherwise physically hurt by someone?: No     Within the past 12 months, have you been humiliated or emotionally abused in other ways by your partner or ex-partner?: No       General Appearance: No distress, normal body habitus, upright.  ENT/Mouth:  Normal head shape, no evidence of injury or laceration.  EYES: No scleral icterus, normal conjunctivae  Neck:  No evidence of thyromegaly  Chest/Lungs: No audible wheezing. Non labored breathing. No cough.  Cardiovascular: No evidence of elevated jugular venous pressure.   Skin: No  xanthelasma, normal skin collar. No evidence of facial lacerations.  Neurologic: No focal neurological defect. Normal speech.  Psychiatric: Alert and oriented x3      Thank you for allowing me to participate in the care of your patient.      Sincerely,   Nik Pineda MD   [facility]  cc:   Nik Pineda MD  [address] [facility] [address]  [place] [address]

## 2024-04-26 NOTE — PROGRESS NOTES
Venessa is a 38 year old who is being evaluated via a billable video visit.    How would you like to obtain your AVS? MyChart  If the video visit is dropped, the invitation should be resent by: Text to cell phone: [phone]  Will anyone else be joining your video visit? No    Video-Visit Details    Type of service:  Video Visit   Originating Location (pt. Location): Home    Distant Location (provider location):  On-site  Platform used for Video Visit: Ramirez     This clinic visit was performed via telephone/video .    Today, I had the pleasure of connecting with Venessa Guillen for a follow-up visit.  She is a pleasant 38 year old patient who presents to the clinic for follow-up visit.  I previously seen the patient 24 months ago in consultation for palpitations.  Rhythm monitor at that time it showed occasional PVCs and a few episodes of Mobitz type I AV block during sleep.  We decided to hold off on doing any medications and schedule her for a visit today with a  repeat holter.  She also underwent a Holter monitor prior to today's visit which shows few episodes of ectopic beats but no worrisome arrhythmias.  Occasional Mobitz type I AV block was again noted.  Today, she tells me that she has been feeling extremely well and does not have any complaints.  She occasionally feels palpitations which subside within a couple of seconds.  She otherwise denies chest pain, shortness of breath, chest tightness, lightheadedness, presyncope or syncope.      Assessment:  1.  Palpitations-secondary to rare PVCs  2.  Rare episode of Mobitz type I AV block during sleep  3.  Postpartum thyroiditis    The patient is currently doing well and does not have any significant complaints.  I do not believe she needs to be on AV zonia blocking agents at this time as the episodes of ectopic beats are rare and she is not symptomatic.  I will have her follow up with PCP and see us on as needed basis.  The lowest heart rate recorded on Holter  was 44 and I told her that it is normal to have low heart rate during sleep and not to be worried about it.  We also talked about the fact that vagal tone is high during sleep and we often see bradycardia/pauses/benign form of AV block during sleep and it is quite possible that A-V block may never progressed to a more serious one.  She understands.    I spent 35 minutes taking care of the patient.  I reviewed her EKG, both rhythm monitors, echocardiogram, medications and blood work.    Plan:   1.  Follow-up with your PCP.    Thank you for allowing me to participate in the care of Venessa Guillen    This note was completed in part using Dragon voice recognition software. Although reviewed after completion, some word and grammatical errors may occur.    Nik Pineda MD  Cardiology    No orders of the defined types were placed in this encounter.      No orders of the defined types were placed in this encounter.      There are no discontinued medications.    No diagnosis found.    CURRENT MEDICATIONS:  Current Outpatient Medications   Medication Sig Dispense Refill    acetaminophen (TYLENOL) 500 MG tablet Take 2 tablets (1,000 mg) by mouth every 6 hours as needed for mild pain 100 tablet 0    levonorgestrel (MIRENA) 20 MCG/24HR IUD 1 each (20 mcg) by Intrauterine route once      Prenatal MV & Min w/FA-DHA (PRENATAL ADULT GUMMY/DHA/FA PO) Take 2 chew tab by mouth daily          ALLERGIES     Allergies   Allergen Reactions    Shellfish Allergy Anaphylaxis and Hives     Ok with topical iodine    Ceclor [Cefaclor]        PAST MEDICAL HISTORY:  Past Medical History:   Diagnosis Date    Chickenpox        PAST SURGICAL HISTORY:  Past Surgical History:   Procedure Laterality Date    BIOPSY OF SKIN LESION      mole removal    COLONOSCOPY      x2    MOUTH SURGERY      wisdom teeth       FAMILY HISTORY:  Family History   Problem Relation Age of Onset    Substance Abuse Father         recovered A&D    Hypertension Maternal  Grandmother     Prostate Cancer Maternal Grandfather     Skin Cancer Maternal Grandfather         BCC    Diabetes Maternal Grandfather     Heart Surgery Maternal Grandfather         bypass    Lung Cancer Paternal Grandmother     Diabetes Paternal Grandmother     Heart Surgery Paternal Grandmother         bypass    Prostate Cancer Paternal Grandfather        SOCIAL HISTORY:  Social History     Socioeconomic History    Marital status:      Spouse name: None    Number of children: None    Years of education: None    Highest education level: None   Tobacco Use    Smoking status: Never    Smokeless tobacco: Never   Vaping Use    Vaping status: Never Used   Substance and Sexual Activity    Alcohol use: Yes     Comment: Minimal    Drug use: No    Sexual activity: Yes     Partners: Male     Birth control/protection: I.U.D.     Comment:  since 2016   Other Topics Concern    Parent/sibling w/ CABG, MI or angioplasty before 65F 55M? No     Social Determinants of Health     Interpersonal Safety: Low Risk  (2/22/2024)    Interpersonal Safety     Do you feel physically and emotionally safe where you currently live?: Yes     Within the past 12 months, have you been hit, slapped, kicked or otherwise physically hurt by someone?: No     Within the past 12 months, have you been humiliated or emotionally abused in other ways by your partner or ex-partner?: No       General Appearance: No distress, normal body habitus, upright.  ENT/Mouth:  Normal head shape, no evidence of injury or laceration.  EYES: No scleral icterus, normal conjunctivae  Neck:  No evidence of thyromegaly  Chest/Lungs: No audible wheezing. Non labored breathing. No cough.  Cardiovascular: No evidence of elevated jugular venous pressure.   Skin: No xanthelasma, normal skin collar. No evidence of facial lacerations.  Neurologic: No focal neurological defect. Normal speech.  Psychiatric: Alert and oriented x3

## 2024-05-08 RX ORDER — BISACODYL 5 MG/1
TABLET, DELAYED RELEASE ORAL
Qty: 4 TABLET | Refills: 0 | Status: SHIPPED | OUTPATIENT
Start: 2024-05-08

## 2024-05-08 NOTE — TELEPHONE ENCOUNTER
Extended Golytely Bowel Prep . Instructions were sent via voxapp. Bowel prep was sent 5/8/2024 to [place], [place] - [address] [facility].       Lydia Patrick RN Colorectal Cancer    Division of Gastroenterology at [facility]

## 2024-05-23 ENCOUNTER — TELEPHONE (OUTPATIENT)
Dept: GASTROENTEROLOGY | Facility: CLINIC | Age: 39
End: 2024-05-23
Payer: COMMERCIAL

## 2024-05-23 NOTE — TELEPHONE ENCOUNTER
Pre assessment completed for upcoming procedure.   (Please see previous telephone encounter notes for complete details)      Procedure details:    Arrival time and facility location reviewed.    Pre op exam needed? N/A    Designated  policy reviewed. Instructed to have someone stay 6 hours post procedure.       Medication review:    Medications reviewed. Please see supporting documentation below. Holding recommendations discussed (if applicable).   NSAID medication(s): Ibuprofen (Advil, Motrin): HOLD 1 day before procedure.      Prep for procedure:     Patient confirmed they already picked up the bowel prep scripts.     Patient stated they have already reviewed the bowel prep instructions and writer answered all patient questions (if applicable). NPO instructions reviewed.    Patient was instructed to call if any questions or concerns arise.       Any additional information needed:  N/A      Patient verbalized understanding and had no questions or concerns at this time.      Venessa Carrera RN  Endoscopy Procedure Pre Assessment RN  [phone] option 4

## 2024-05-23 NOTE — TELEPHONE ENCOUNTER
Pre visit planning completed.      Procedure details:    Patient scheduled for Colonoscopy  on 6/5/24.     Arrival time: 0645. Procedure time 0730    Facility location: [facility]; [facility] [place]note 2] [place] [address]. Check in location: 1st [facility].     Sedation type: Conscious sedation     Pre op exam needed? N/A    Indication for procedure: Blood in stool,  Abdominal pain, left lower quadrant      Chart review:     Electronic implanted devices? No    Recent diagnosis of diverticulitis within the last 6 weeks? No    Diabetic? No      Medication review:    Anticoagulants? No    NSAIDS? No NSAID medications per patient's medication list.  RN will verify with pre-assessment call.    Other medication HOLDING recommendations:  N/A      Prep for procedure:     Bowel prep recommendation: Extended Golytely. Bowel prep prescription sent to [facility] [place], [place] - [address] [facility] by [facility] nursing team on 5/8/24  Due to: constipation noted or reported.     Prep instructions sent via [employer] by [facility] nursing team on 5/8/24.        Venessa Carrera RN  Endoscopy Procedure Pre Assessment RN  [phone] option 4

## 2024-05-31 ENCOUNTER — TELEPHONE (OUTPATIENT)
Dept: GASTROENTEROLOGY | Facility: CLINIC | Age: 39
End: 2024-05-31
Payer: COMMERCIAL

## 2024-05-31 NOTE — TELEPHONE ENCOUNTER
Caller: Venessa Guillen      Reason for Reschedule/Cancellation   (please be detailed, any staff messages or encounters to note?): schedule conflict      Prior to reschedule please review:  Ordering Provider: MINOO  Sedation Determined: CS  Does patient have any ASC Exclusions, please identify?: NO      Notes on Cancelled Procedure:  Procedure: Lower Endoscopy [Colonoscopy]   Date: 6/5/24  Location: [facility]; [address] [place] [address]   Surgeon: KOFI      Rescheduled: Yes,   Procedure: Lower Endoscopy [Colonoscopy]    Date: 6/28/24   Location: [facility]; [address] [place] [address]    Surgeon: OSEI   Sedation Level Scheduled  CS ,  Reason for Sedation Level PER ORDER   Instructions updated and sent: Y     Does patient need PAC or Pre -Op Rescheduled? : N       Did you cancel or rescheduled an EUS procedure? No.

## 2024-06-06 ENCOUNTER — E-VISIT (OUTPATIENT)
Dept: URGENT CARE | Facility: CLINIC | Age: 39
End: 2024-06-06
Payer: COMMERCIAL

## 2024-06-06 DIAGNOSIS — J01.90 ACUTE SINUSITIS WITH SYMPTOMS > 10 DAYS: Primary | ICD-10-CM

## 2024-06-06 PROCEDURE — 99421 OL DIG E/M SVC 5-10 MIN: CPT | Performed by: PHYSICIAN ASSISTANT

## 2024-06-06 NOTE — PATIENT INSTRUCTIONS
Dear Venessa Guillen    After reviewing your responses, I've been able to diagnose you with Acute sinusitis with symptoms > 10 days.      Based on your responses and diagnosis, I have prescribed   Orders Placed This Encounter   Medications     amoxicillin-clavulanate (AUGMENTIN) 875-125 MG tablet     Sig: Take 1 tablet by mouth 2 times daily for 7 days     Dispense:  14 tablet     Refill:  0      to treat your symptoms. I have sent this to your pharmacy.?     It is also important to stay well hydrated, get lots of rest and take over-the-counter decongestants,?tylenol?or ibuprofen if you?are able to?take those medications per your primary care provider to help relieve discomfort.?     It is important that you take?all of?your prescribed medication even if your symptoms are improving after a few doses.? Taking?all of?your medicine helps prevent the symptoms from returning.?     If your symptoms worsen, you develop severe headache, vomiting, high fever (>102), or are not improving in 7 days, please contact your primary care provider for an appointment or visit any of our convenient Walk-in Care or Urgent Care Centers to be seen which can be found on our website?here.?     Thanks again for choosing?us?as your health care partner,?   ?  Ludy Pagan PA-C?

## 2024-06-21 NOTE — TELEPHONE ENCOUNTER
Attempted to contact patient in order to complete pre assessment questions.     No answer. Left message to return call to [phone] option 4    Callback communication sent via [employer].    Krystal Harrell RN

## 2024-06-21 NOTE — TELEPHONE ENCOUNTER
Rescheduled procedure-Pre Visit Planning Completed    Patient scheduled for Colonoscopy  on 6.28.2024.    Arrival time: 1015. Procedure time 1100    Facility location: [facility]; [address] [place] [address] [place] [address]    Sedation type: Conscious sedation     Pre op exam needed? N/A     No new medical events or medications since last review.     Constipation noted per 2.22.2024 endo scheduling questionnaire.  Verify bowel habits and send bowel prep instructions.    Yessica Avila RN  Endoscopy Procedure Pre Assessment

## 2024-06-24 NOTE — TELEPHONE ENCOUNTER
Pre assessment completed for upcoming procedure.   (Please see previous telephone encounter notes for complete details)    Procedure details:    Arrival time and facility location reviewed.    Pre op exam needed? N/A    Designated  policy reviewed. Instructed to have someone stay 6  hours post procedure.       Medication review:    Medications reviewed. Please see supporting documentation below. Holding recommendations discussed (if applicable).       Prep for procedure:    Patient stated they have already reviewed the bowel prep instructions and writer answered all patient questions (if applicable). NPO instructions reviewed.      Pt stated she had 2-3 BM per week.  Pt will do the Extended GL prep.       Patient was instructed to call if any questions or concerns arise.        Any additional information needed:  N/A      Patient  verbalized understanding and had no questions or concerns at this time.      Krystal Harrell, RN  [phone] option 4

## 2024-06-28 ENCOUNTER — TRANSFERRED RECORDS (OUTPATIENT)
Dept: HEALTH INFORMATION MANAGEMENT | Facility: CLINIC | Age: 39
End: 2024-06-28

## 2024-06-28 ENCOUNTER — HOSPITAL ENCOUNTER (OUTPATIENT)
Facility: CLINIC | Age: 39
Discharge: HOME | End: 2024-06-28
Attending: STUDENT IN AN ORGANIZED HEALTH CARE EDUCATION/TRAINING PROGRAM | Admitting: STUDENT IN AN ORGANIZED HEALTH CARE EDUCATION/TRAINING PROGRAM
Payer: COMMERCIAL

## 2024-06-28 VITALS
BODY MASS INDEX: 22.49 KG/M2 | DIASTOLIC BLOOD PRESSURE: 62 MMHG | WEIGHT: 135 LBS | SYSTOLIC BLOOD PRESSURE: 101 MMHG | OXYGEN SATURATION: 99 % | HEIGHT: 65 IN | RESPIRATION RATE: 20 BRPM | HEART RATE: 69 BPM

## 2024-06-28 LAB — COLONOSCOPY: NORMAL

## 2024-06-28 PROCEDURE — 99153 MOD SED SAME PHYS/QHP EA: CPT | Performed by: STUDENT IN AN ORGANIZED HEALTH CARE EDUCATION/TRAINING PROGRAM

## 2024-06-28 PROCEDURE — 258N000003 HC RX IP 258 OP 636: Performed by: STUDENT IN AN ORGANIZED HEALTH CARE EDUCATION/TRAINING PROGRAM

## 2024-06-28 PROCEDURE — G0500 MOD SEDAT ENDO SERVICE >5YRS: HCPCS | Performed by: STUDENT IN AN ORGANIZED HEALTH CARE EDUCATION/TRAINING PROGRAM

## 2024-06-28 PROCEDURE — 88305 TISSUE EXAM BY PATHOLOGIST: CPT | Mod: TC | Performed by: STUDENT IN AN ORGANIZED HEALTH CARE EDUCATION/TRAINING PROGRAM

## 2024-06-28 PROCEDURE — 250N000011 HC RX IP 250 OP 636: Performed by: STUDENT IN AN ORGANIZED HEALTH CARE EDUCATION/TRAINING PROGRAM

## 2024-06-28 PROCEDURE — 88305 TISSUE EXAM BY PATHOLOGIST: CPT | Mod: 26 | Performed by: PATHOLOGY

## 2024-06-28 PROCEDURE — 45380 COLONOSCOPY AND BIOPSY: CPT | Performed by: STUDENT IN AN ORGANIZED HEALTH CARE EDUCATION/TRAINING PROGRAM

## 2024-06-28 RX ORDER — ONDANSETRON 2 MG/ML
4 INJECTION INTRAMUSCULAR; INTRAVENOUS
Status: DISCONTINUED | OUTPATIENT
Start: 2024-06-28 | End: 2024-06-28 | Stop reason: HOSPADM

## 2024-06-28 RX ORDER — NALOXONE HYDROCHLORIDE 0.4 MG/ML
0.2 INJECTION, SOLUTION INTRAMUSCULAR; INTRAVENOUS; SUBCUTANEOUS
Status: DISCONTINUED | OUTPATIENT
Start: 2024-06-28 | End: 2024-06-28 | Stop reason: HOSPADM

## 2024-06-28 RX ORDER — NALOXONE HYDROCHLORIDE 0.4 MG/ML
0.4 INJECTION, SOLUTION INTRAMUSCULAR; INTRAVENOUS; SUBCUTANEOUS
Status: DISCONTINUED | OUTPATIENT
Start: 2024-06-28 | End: 2024-06-28 | Stop reason: HOSPADM

## 2024-06-28 RX ORDER — FLUMAZENIL 0.1 MG/ML
0.2 INJECTION, SOLUTION INTRAVENOUS
Status: DISCONTINUED | OUTPATIENT
Start: 2024-06-28 | End: 2024-06-28 | Stop reason: HOSPADM

## 2024-06-28 RX ORDER — ONDANSETRON 2 MG/ML
4 INJECTION INTRAMUSCULAR; INTRAVENOUS EVERY 6 HOURS PRN
Status: DISCONTINUED | OUTPATIENT
Start: 2024-06-28 | End: 2024-06-28 | Stop reason: HOSPADM

## 2024-06-28 RX ORDER — PROCHLORPERAZINE MALEATE 10 MG
10 TABLET ORAL EVERY 6 HOURS PRN
Status: DISCONTINUED | OUTPATIENT
Start: 2024-06-28 | End: 2024-06-28 | Stop reason: HOSPADM

## 2024-06-28 RX ORDER — ONDANSETRON 4 MG/1
4 TABLET, ORALLY DISINTEGRATING ORAL EVERY 6 HOURS PRN
Status: DISCONTINUED | OUTPATIENT
Start: 2024-06-28 | End: 2024-06-28 | Stop reason: HOSPADM

## 2024-06-28 RX ORDER — SODIUM CHLORIDE 9 MG/ML
INJECTION, SOLUTION INTRAVENOUS CONTINUOUS PRN
Status: DISCONTINUED | OUTPATIENT
Start: 2024-06-28 | End: 2024-06-28 | Stop reason: HOSPADM

## 2024-06-28 RX ORDER — FENTANYL CITRATE 50 UG/ML
INJECTION, SOLUTION INTRAMUSCULAR; INTRAVENOUS PRN
Status: DISCONTINUED | OUTPATIENT
Start: 2024-06-28 | End: 2024-06-28 | Stop reason: HOSPADM

## 2024-06-28 RX ORDER — LIDOCAINE 40 MG/G
CREAM TOPICAL
Status: DISCONTINUED | OUTPATIENT
Start: 2024-06-28 | End: 2024-06-28 | Stop reason: HOSPADM

## 2024-06-28 ASSESSMENT — ACTIVITIES OF DAILY LIVING (ADL)
ADLS_ACUITY_SCORE: 35
ADLS_ACUITY_SCORE: 35

## 2024-06-28 NOTE — H&P
Pre-Endoscopy History and Physical     Venessa Guillen MRN# [id number]   YOB: 1985 Age: 38 year old     Date of Procedure: 06/28/24  Primary care provider: Analy, [facility]  Type of Endoscopy: Colonoscopy  Reason for Procedure: diagnostic abd pain and bleeding  Type of Anesthesia Anticipated: Moderate Sedation    HPI:    Venessa is a 38 year old female who will be undergoing the above procedure.      Prior colonoscopy: 2009 for abdominal pain, Dr. Villafana. Adequate prep, Normal. Rand bx.  (Atropine 0.5 mg, Fent 200, Lido 2%, Versed 5).     A history and physical has been performed. Follows with endo for hyperthyroidism. Has h/o heart block (Cards note state palp 2/2 rare PVCs, rare episode of MOBITZ TYPE I AV block during sleep; in setting of post-partum thyroiditis in 2021 and 2024).     Comes given severe abdomina pain and blood in stool in Nov. No imaging. No labs. Resolved. The patient's medications and allergies have been reviewed. The risks and benefits of the procedure and the sedation options and risks were discussed with the patient.  All questions were answered and informed consent was obtained.      Denies a personal or family history of anesthesia complications or bleeding disorders except as noted in endo report above  denies a family history of CRC.  denies a family history of IBD.  denies changes in bowel habits.  denies blood in stool.     Allergies   Allergen Reactions    Shellfish Allergy Anaphylaxis and Hives     Ok with topical iodine    Ceclor [Cefaclor]       Allergy to Latex: NO   Intolerances: NO     Current Facility-Administered Medications   Medication Dose Route Frequency Provider Last Rate Last Admin    flumazenil (ROMAZICON) injection 0.2 mg  0.2 mg Intravenous q1 min prn Rena Young MD        lidocaine (LMX4) cream   Topical Q1H PRN Rena Young MD        lidocaine 1 % 0.1-1 mL  0.1-1 mL Other Q1H PRN Rena Young MD        naloxone (NARCAN)  injection 0.2 mg  0.2 mg Intravenous Q2 Min PRN Rena Young MD        naloxone (NARCAN) injection 0.2 mg  0.2 mg Intramuscular Q2 Min PRN Rena Young MD        naloxone (NARCAN) injection 0.4 mg  0.4 mg Intravenous Q2 Min PRN Rena Young MD        naloxone (NARCAN) injection 0.4 mg  0.4 mg Intramuscular Q2 Min PRN Rena Young MD        ondansetron (ZOFRAN ODT) ODT tab 4 mg  4 mg Oral Q6H PRN Rena Young MD        Or    ondansetron (ZOFRAN) injection 4 mg  4 mg Intravenous Q6H PRN Rena Young MD        ondansetron (ZOFRAN) injection 4 mg  4 mg Intravenous Once PRN Rean Young MD        prochlorperazine (COMPAZINE) injection 10 mg  10 mg Intravenous Q6H PRN Rena Young MD        Or    prochlorperazine (COMPAZINE) tablet 10 mg  10 mg Oral Q6H PRN Rena Young MD        sodium chloride (PF) 0.9% PF flush 3 mL  3 mL Intracatheter Q8H Rena Young MD        sodium chloride (PF) 0.9% PF flush 3 mL  3 mL Intracatheter q1 min prn Rena Young MD           Patient Active Problem List   Diagnosis    CARDIOVASCULAR SCREENING; LDL GOAL LESS THAN 160    Multigravida of advanced maternal age in third trimester    Encounter for IUD insertion    Tachycardia    Wenckebach second degree AV block    Hyperthyroidism    Thyroid nodule        Past Medical History:   Diagnosis Date    Chickenpox         Past Surgical History:   Procedure Laterality Date    BIOPSY OF SKIN LESION      mole removal    COLONOSCOPY      x2    MOUTH SURGERY      wisdom teeth       Social History     Tobacco Use    Smoking status: Never    Smokeless tobacco: Never   Substance Use Topics    Alcohol use: Yes     Comment: Minimal       Family History   Problem Relation Age of Onset    Substance Abuse Father         recovered A&D    Hypertension Maternal Grandmother     Prostate Cancer Maternal Grandfather     Skin Cancer Maternal Grandfather         BCC    Diabetes Maternal Grandfather      "Heart Surgery Maternal Grandfather         bypass    Lung Cancer Paternal Grandmother     Diabetes Paternal Grandmother     Heart Surgery Paternal Grandmother         bypass    Prostate Cancer Paternal Grandfather        REVIEW OF SYSTEMS:     5 point ROS negative except as noted above in HPI, including Gen., Resp., CV, GI &  system review.      PHYSICAL EXAM:   /73   Pulse 74   Resp 22   Ht 1.651 m (5' 5\")   Wt 61.2 kg (135 lb)   SpO2 100%   BMI 22.47 kg/m   Estimated body mass index is 22.47 kg/m  as calculated from the following:    Height as of this encounter: 1.651 m (5' 5\").    Weight as of this encounter: 61.2 kg (135 lb).   All Vitals have been reviewed.    GENERAL APPEARANCE: healthy and alert  MENTAL STATUS: alert  AIRWAY EXAM: Mallampatti Class I (visualization of the soft palate, fauces, uvula, anterior and posterior pillars)  RESP: lungs clear to auscultation - no rales, rhonchi or wheezes  CV: regular rates and rhythm      IMPRESSION   ASA Class 2 - Mild systemic disease        PLAN:     Plan for colonoscopy. We discussed the risks, benefits and alternatives and the patient wished to proceed.          Rena Young MD  [facility]  Phone: [phone]  Fax: [phone]  June 28, 2024   "

## 2024-07-01 LAB
PATH REPORT.COMMENTS IMP SPEC: NORMAL
PATH REPORT.COMMENTS IMP SPEC: NORMAL
PATH REPORT.FINAL DX SPEC: NORMAL
PATH REPORT.GROSS SPEC: NORMAL
PATH REPORT.MICROSCOPIC SPEC OTHER STN: NORMAL
PATH REPORT.RELEVANT HX SPEC: NORMAL
PHOTO IMAGE: NORMAL

## 2024-09-19 ENCOUNTER — LAB (OUTPATIENT)
Dept: LAB | Facility: CLINIC | Age: 39
End: 2024-09-19
Payer: COMMERCIAL

## 2024-09-19 DIAGNOSIS — E05.90 HYPERTHYROIDISM: ICD-10-CM

## 2024-09-19 LAB — TSH SERPL DL<=0.005 MIU/L-ACNC: 1.56 UIU/ML (ref 0.3–4.2)

## 2024-09-19 PROCEDURE — 36415 COLL VENOUS BLD VENIPUNCTURE: CPT

## 2024-09-19 PROCEDURE — 84443 ASSAY THYROID STIM HORMONE: CPT

## 2024-09-19 NOTE — LETTER
September 23, 2024      Venessa Guillen  [address]  [place] [address]        Dear Venessa,    We are writing to inform you of your test results.    Your test results fall within the expected range(s) or remain unchanged from previous results.  Please continue with current treatment plan.    Resulted Orders   TSH with free T4 reflex   Result Value Ref Range    TSH 1.56 0.30 - 4.20 uIU/mL       If you have any questions or concerns, please call the clinic at the number listed above.       Sincerely,      Sivan Goodwin MD

## 2024-09-22 NOTE — RESULT ENCOUNTER NOTE
Please inform patient that test result was within normal parameters.   Thank you.     Sivan Goodwin M.D.

## 2024-11-21 ENCOUNTER — E-VISIT (OUTPATIENT)
Dept: URGENT CARE | Facility: CLINIC | Age: 39
End: 2024-11-21
Payer: COMMERCIAL

## 2024-11-21 DIAGNOSIS — K14.6 TONGUE BURNING SENSATION: Primary | ICD-10-CM

## 2024-11-21 NOTE — PATIENT INSTRUCTIONS
Dear Venessa,    We are sorry you are not feeling well. Based on the responses you provided, it is recommended that you be seen in-person in clinic so we can better evaluate your symptoms. Please schedule this visit in [employer]. You will have a Schedule Now button in [employer] to help with scheduling this appointment. Otherwise, you can call [phone] to schedule an appointment.     You will not be charged for this eVisit. Thank you for trusting us with your care.     Ludy Pagan PA-C

## 2024-12-09 ENCOUNTER — OFFICE VISIT (OUTPATIENT)
Dept: FAMILY MEDICINE | Facility: CLINIC | Age: 39
End: 2024-12-09
Attending: PHYSICIAN ASSISTANT
Payer: COMMERCIAL

## 2024-12-09 VITALS
OXYGEN SATURATION: 99 % | RESPIRATION RATE: 18 BRPM | HEIGHT: 67 IN | TEMPERATURE: 98.5 F | HEART RATE: 84 BPM | SYSTOLIC BLOOD PRESSURE: 118 MMHG | BODY MASS INDEX: 22.21 KG/M2 | WEIGHT: 141.5 LBS | DIASTOLIC BLOOD PRESSURE: 72 MMHG

## 2024-12-09 DIAGNOSIS — E04.1 THYROID NODULE: ICD-10-CM

## 2024-12-09 DIAGNOSIS — K14.6 BURNING TONGUE: Primary | ICD-10-CM

## 2024-12-09 LAB
FOLATE SERPL-MCNC: 19.7 NG/ML (ref 4.6–34.8)
IRON BINDING CAPACITY (ROCHE): 326 UG/DL (ref 240–430)
IRON SATN MFR SERPL: 38 % (ref 15–46)
IRON SERPL-MCNC: 124 UG/DL (ref 37–145)
TSH SERPL DL<=0.005 MIU/L-ACNC: 2.59 UIU/ML (ref 0.3–4.2)

## 2024-12-09 PROCEDURE — 83540 ASSAY OF IRON: CPT

## 2024-12-09 PROCEDURE — 82746 ASSAY OF FOLIC ACID SERUM: CPT

## 2024-12-09 PROCEDURE — 99214 OFFICE O/P EST MOD 30 MIN: CPT

## 2024-12-09 PROCEDURE — 99000 SPECIMEN HANDLING OFFICE-LAB: CPT

## 2024-12-09 PROCEDURE — 36415 COLL VENOUS BLD VENIPUNCTURE: CPT

## 2024-12-09 PROCEDURE — 84443 ASSAY THYROID STIM HORMONE: CPT

## 2024-12-09 PROCEDURE — 86431 RHEUMATOID FACTOR QUANT: CPT

## 2024-12-09 PROCEDURE — 83550 IRON BINDING TEST: CPT

## 2024-12-09 PROCEDURE — 82607 VITAMIN B-12: CPT

## 2024-12-09 PROCEDURE — 86140 C-REACTIVE PROTEIN: CPT

## 2024-12-09 PROCEDURE — 84630 ASSAY OF ZINC: CPT | Mod: 90

## 2024-12-09 ASSESSMENT — PAIN SCALES - GENERAL: PAINLEVEL_OUTOF10: NO PAIN (0)

## 2024-12-09 NOTE — PROGRESS NOTES
Assessment & Plan     Burning tongue  Tongue only burning for 2 months. Considering vitamin deficiencies.     - Adult ENT  Referral; Future  - Folate; Future  - Zinc; Future  - Vitamin B12; Future  - Vitamin B6; Future  - Iron and iron binding capacity; Future  - Folate  - Zinc  - Vitamin B12  - Vitamin B6  - Iron and iron binding capacity    Thyroid nodule  Patient has two thyroid nodules.    - TSH with free T4 reflex; Future  - TSH with free T4 reflex            CONSULTATION/REFERRAL to ENT    Subjective   Venessa is a 39 year old, presenting for the following health issues:  Mouth Problem (Burning tongue )        12/9/2024     1:00 PM   Additional Questions   Roomed by Bhavani SINGH LPN   Accompanied by self         12/9/2024     1:00 PM   Patient Reported Additional Medications   Patient reports taking the following new medications no new meds     Tongue only intermittent burning for 2 months, mostly in the evening. Considering vitamin deficiencies. Patient has of 2 thyroid nodules currently being observed. Patient denies any change in taste, and burning does not keep her form eating regularly. Patient reports she has not been to the dentist in years. Advised patient to see dentist. Patient tongue millard not show thrush or plaque. Patient tongue does not have pain or cramping. Patient denies any new supplements, food changes, product changes, toothpaste, or other environmental changes. Patient denies headache, pain, and nausea.     History of Present Illness       Reason for visit:  Burning tongue  Symptom onset:  More than a month (since the end of September or so)  Symptoms include:  Burning tongue (like the feeling you get when you eat captain crunch cereal but she hasn't been eating anything like that.)  Symptom intensity:  Mild (eating normally, no pain. No dry mouth or sores)  Symptom progression:  Worsening (is wondering if it may be a vitamin deficiency and would like to get some testing done)  Had  "these symptoms before:  No  What makes it worse:  No  What makes it better:  Liquids, hard candy   She is taking medications regularly.         Review of Systems  CONSTITUTIONAL: NEGATIVE for fever, chills, change in weight  ENT/MOUTH: NEGATIVE for ear, mouth and throat problems  RESP: NEGATIVE for significant cough or SOB  CV: NEGATIVE for chest pain, palpitations or peripheral edema      Objective    /72 (BP Location: Right arm, Patient Position: Sitting, Cuff Size: Adult Regular)   Pulse 84   Temp 98.5  F (36.9  C) (Tympanic)   Resp 18   Ht 1.69 m (5' 6.54\")   Wt 64.2 kg (141 lb 8 oz)   LMP  (LMP Unknown)   SpO2 99%   BMI 22.47 kg/m    Body mass index is 22.47 kg/m .  Physical Exam   GENERAL: alert and no distress  NECK: no adenopathy, no asymmetry, masses, or scars  RESP: lungs clear to auscultation - no rales, rhonchi or wheezes  CV: regular rate and rhythm, normal S1 S2, no S3 or S4, no murmur, click or rub, no peripheral edema  MS: no gross musculoskeletal defects noted, no edema          Signed Electronically by: PETER Zarate CNP    "

## 2024-12-10 LAB — VIT B12 SERPL-MCNC: 614 PG/ML (ref 232–1245)

## 2024-12-12 ENCOUNTER — OFFICE VISIT (OUTPATIENT)
Dept: OTOLARYNGOLOGY | Facility: CLINIC | Age: 39
End: 2024-12-12
Payer: COMMERCIAL

## 2024-12-12 VITALS
HEART RATE: 68 BPM | DIASTOLIC BLOOD PRESSURE: 74 MMHG | SYSTOLIC BLOOD PRESSURE: 109 MMHG | HEIGHT: 67 IN | WEIGHT: 141.8 LBS | BODY MASS INDEX: 22.26 KG/M2 | OXYGEN SATURATION: 100 %

## 2024-12-12 DIAGNOSIS — K21.9 LPRD (LARYNGOPHARYNGEAL REFLUX DISEASE): Primary | ICD-10-CM

## 2024-12-12 DIAGNOSIS — K14.6 BURNING TONGUE: ICD-10-CM

## 2024-12-12 DIAGNOSIS — K14.6 BURNING TONGUE: Primary | ICD-10-CM

## 2024-12-12 LAB
CRP SERPL-MCNC: <3 MG/L
ERYTHROCYTE [SEDIMENTATION RATE] IN BLOOD BY WESTERGREN METHOD: 5 MM/HR (ref 0–20)
RHEUMATOID FACT SERPL-ACNC: <10 IU/ML
ZINC SERPL-MCNC: 87.6 UG/DL

## 2024-12-12 ASSESSMENT — PAIN SCALES - GENERAL: PAINLEVEL_OUTOF10: NO PAIN (0)

## 2024-12-12 NOTE — LETTER
12/12/2024      Venessa Guillen  [address]  [place] [address]      Dear Colleague,    Thank you for referring your patient, Venessa Guillen, to the [facility]. Please see a copy of my visit note below.    Chief Complaint   Patient presents with     Consult     Burning tongue since September. More in the evening, but comes and goes     History of Present Illness   Venessa Guillen is a 39 year old female who presents today for evaluation. The patient was seen back on 12/9/24 for concerns of burning tongue. Vitamin deficiencies were considered, which mostly came back normal. Symptoms have been present intermittently for the past 2 months, mostly in the evening. The patient tells me that she does not have any taste or food tolerances concerns. However, when she drinks water she feels it is more smooth. She denies any mouth dryness or other symptoms. She is taking a multi vitamin . No history of autoimmune disease. She has had concerns with hypothyroidism and was dx with graves disease back in 2022.     Past Medical History  Patient Active Problem List   Diagnosis     CARDIOVASCULAR SCREENING; LDL GOAL LESS THAN 160     Multigravida of advanced maternal age in third trimester     Encounter for IUD insertion     Tachycardia     Wenckebach second degree AV block     Hyperthyroidism     Thyroid nodule     Burning tongue     Current Medications     Current Outpatient Medications:      acetaminophen (TYLENOL) 500 MG tablet, Take 2 tablets (1,000 mg) by mouth every 6 hours as needed for mild pain, Disp: 100 tablet, Rfl: 0     levonorgestrel (MIRENA) 20 MCG/24HR IUD, 1 each (20 mcg) by Intrauterine route once, Disp:  , Rfl:     Allergies  Allergies   Allergen Reactions     Shellfish Allergy Anaphylaxis and Hives     Ok with topical iodine     Ceclor [Cefaclor]        Social History   Social History     Socioeconomic History     Marital status:      Spouse name: None     Number of children:  "None     Years of education: None     Highest education level: None   Tobacco Use     Smoking status: Never     Smokeless tobacco: Never   Vaping Use     Vaping status: Never Used   Substance and Sexual Activity     Alcohol use: Yes     Comment: Minimal     Drug use: No     Sexual activity: Yes     Partners: Male     Birth control/protection: I.U.D.     Comment:  since 2016   Other Topics Concern     Parent/sibling w/ CABG, MI or angioplasty before 65F 55M? No     Social Drivers of Health     Interpersonal Safety: Low Risk  (12/9/2024)    Interpersonal Safety      Do you feel physically and emotionally safe where you currently live?: Yes      Within the past 12 months, have you been hit, slapped, kicked or otherwise physically hurt by someone?: No      Within the past 12 months, have you been humiliated or emotionally abused in other ways by your partner or ex-partner?: No       Family History  Family History   Problem Relation Age of Onset     Substance Abuse Father         recovered A&D     Hypertension Maternal Grandmother      Prostate Cancer Maternal Grandfather      Skin Cancer Maternal Grandfather         BCC     Diabetes Maternal Grandfather      Heart Surgery Maternal Grandfather         bypass     Lung Cancer Paternal Grandmother      Diabetes Paternal Grandmother      Heart Surgery Paternal Grandmother         bypass     Prostate Cancer Paternal Grandfather        Review of Systems  As per HPI and PMHx, otherwise 10+ comprehensive system review is negative.    Physical Exam  /74 (BP Location: Right arm, Patient Position: Sitting, Cuff Size: Adult Regular)   Pulse 68   Ht 1.689 m (5' 6.5\")   Wt 64.3 kg (141 lb 12.8 oz)   LMP  (LMP Unknown)   SpO2 100%   BMI 22.54 kg/m    GENERAL: Patient is a pleasant, cooperative 39 year old female in no acute distress.  HEAD: Normocephalic, atraumatic.  Hair and scalp are normal.  EYES: Pupils are equal, round, reactive to light and accommodation.  " Extraocular movements are intact.  The sclera nonicteric without injection.  The extraocular structures are normal.  EARS: Normal shape and symmetry.  No tenderness when palpating the mastoid or tragal areas bilaterally.  Otoscopic exam reveals no amount of cerumen bilaterally.  The bilateral tympanic membranes are round, intact without evidence of effusion, good landmarks.  No retraction, granulation, or drainage.  NOSE: Nares are patent.  Nasal mucosa is pink.  ORAL CAVITY: Dentition is in good repair.  Mucous membranes are moist. Tongue is mobile, protrudes to the midline. Tonsils are +1.  No erythema or exudate.  No additional oral cavity or oropharyngeal masses, lesions, ulcerations, leukoplakia.  NECK: Supple, trachea is midline.  There no palpable cervical lymphadenopathy or masses bilaterally.  Palpation of the bilateral parotid and submandibular areas reveal no masses.  No thyromegaly.    NEUROLOGIC: Cranial nerves II through XII are grossly intact.  Voice is strong.  Patient is House-Brackmann I/VI bilaterally.  CARDIOVASCULAR: Extremities are warm and well-perfused.  No significant peripheral edema.  RESPIRATORY: Patient has nonlabored breathing without cough, wheeze, stridor.  PSYCHIATRIC: Patient is alert and oriented.  Mood and affect appear normal.  SKIN: Warm and dry.  No scalp, face, or neck lesions noted.    Procedure: Flexible Laryngoscopy   Indication: Burning Tongue    To best visualize the upper airway anatomy and due to the chief complaint and HPI, I proceeded with flexible fiberoptic laryngoscopy examination. The bilateral nasal cavities were anesthetized and decongested. The bilateral nasal cavities were examined using a flexible fiberoptic laryngoscope. There were no nasal cavity masses, polyps, or mucopurulence bilaterally. The nasal septum straight. The nasopharynx had a normal appearance with normal Eustachian tube openings and fossa of Rosenmuller bilaterally. Normal adenoid tissue. The  base of tongue has slight cobblestoning, vallecula, epiglottis, aryepiglottic folds, arytenoids appear edematous, and piriform sinuses were without mass or lesion. The bilateral true vocal folds were symmetrically mobile without nodules or masses. The visualized portions of the infraglottic and subglottic airway are unremarkable. The scope was removed. The patient tolerated the procedure well.                  Assessment and Plan     ICD-10-CM    1. Burning tongue  K14.6 Adult ENT  Referral        It was my pleasure seeing Venessa Guillen today in clinic.  Based on patient history and physical examination, it is possible that she is experiencing silent reflux. Therefore, we discussed having a laryngeal scope completed, which showed laryngeal reflux. Her symptoms could also be related to an autoimmune disorder, which we will rule out. We discussed starting Omeprazole, which we will do if all labs come back negative.     PETER Aguilar CNP  Otolaryngology  [facility]         Again, thank you for allowing me to participate in the care of your patient.        Sincerely,        PETER Aguilar CNP

## 2024-12-12 NOTE — PROGRESS NOTES
Chief Complaint   Patient presents with    Consult     Burning tongue since September. More in the evening, but comes and goes     History of Present Illness   Venessa Guillen is a 39 year old female who presents today for evaluation. The patient was seen back on 12/9/24 for concerns of burning tongue. Vitamin deficiencies were considered, which mostly came back normal. Symptoms have been present intermittently for the past 2 months, mostly in the evening. The patient tells me that she does not have any taste or food tolerances concerns. However, when she drinks water she feels it is more smooth. She denies any mouth dryness or other symptoms. She is taking a multi vitamin . No history of autoimmune disease. She has had concerns with hypothyroidism and was dx with graves disease back in 2022.     Past Medical History  Patient Active Problem List   Diagnosis    CARDIOVASCULAR SCREENING; LDL GOAL LESS THAN 160    Multigravida of advanced maternal age in third trimester    Encounter for IUD insertion    Tachycardia    Wenckebach second degree AV block    Hyperthyroidism    Thyroid nodule    Burning tongue     Current Medications     Current Outpatient Medications:     acetaminophen (TYLENOL) 500 MG tablet, Take 2 tablets (1,000 mg) by mouth every 6 hours as needed for mild pain, Disp: 100 tablet, Rfl: 0    levonorgestrel (MIRENA) 20 MCG/24HR IUD, 1 each (20 mcg) by Intrauterine route once, Disp:  , Rfl:     Allergies  Allergies   Allergen Reactions    Shellfish Allergy Anaphylaxis and Hives     Ok with topical iodine    Ceclor [Cefaclor]        Social History   Social History     Socioeconomic History    Marital status:      Spouse name: None    Number of children: None    Years of education: None    Highest education level: None   Tobacco Use    Smoking status: Never    Smokeless tobacco: Never   Vaping Use    Vaping status: Never Used   Substance and Sexual Activity    Alcohol use: Yes     Comment: Minimal     "Drug use: No    Sexual activity: Yes     Partners: Male     Birth control/protection: I.U.D.     Comment:  since 2016   Other Topics Concern    Parent/sibling w/ CABG, MI or angioplasty before 65F 55M? No     Social Drivers of Health     Interpersonal Safety: Low Risk  (12/9/2024)    Interpersonal Safety     Do you feel physically and emotionally safe where you currently live?: Yes     Within the past 12 months, have you been hit, slapped, kicked or otherwise physically hurt by someone?: No     Within the past 12 months, have you been humiliated or emotionally abused in other ways by your partner or ex-partner?: No       Family History  Family History   Problem Relation Age of Onset    Substance Abuse Father         recovered A&D    Hypertension Maternal Grandmother     Prostate Cancer Maternal Grandfather     Skin Cancer Maternal Grandfather         BCC    Diabetes Maternal Grandfather     Heart Surgery Maternal Grandfather         bypass    Lung Cancer Paternal Grandmother     Diabetes Paternal Grandmother     Heart Surgery Paternal Grandmother         bypass    Prostate Cancer Paternal Grandfather        Review of Systems  As per HPI and PMHx, otherwise 10+ comprehensive system review is negative.    Physical Exam  /74 (BP Location: Right arm, Patient Position: Sitting, Cuff Size: Adult Regular)   Pulse 68   Ht 1.689 m (5' 6.5\")   Wt 64.3 kg (141 lb 12.8 oz)   LMP  (LMP Unknown)   SpO2 100%   BMI 22.54 kg/m    GENERAL: Patient is a pleasant, cooperative 39 year old female in no acute distress.  HEAD: Normocephalic, atraumatic.  Hair and scalp are normal.  EYES: Pupils are equal, round, reactive to light and accommodation.  Extraocular movements are intact.  The sclera nonicteric without injection.  The extraocular structures are normal.  EARS: Normal shape and symmetry.  No tenderness when palpating the mastoid or tragal areas bilaterally.  Otoscopic exam reveals no amount of cerumen " bilaterally.  The bilateral tympanic membranes are round, intact without evidence of effusion, good landmarks.  No retraction, granulation, or drainage.  NOSE: Nares are patent.  Nasal mucosa is pink.  ORAL CAVITY: Dentition is in good repair.  Mucous membranes are moist. Tongue is mobile, protrudes to the midline. Tonsils are +1.  No erythema or exudate.  No additional oral cavity or oropharyngeal masses, lesions, ulcerations, leukoplakia.  NECK: Supple, trachea is midline.  There no palpable cervical lymphadenopathy or masses bilaterally.  Palpation of the bilateral parotid and submandibular areas reveal no masses.  No thyromegaly.    NEUROLOGIC: Cranial nerves II through XII are grossly intact.  Voice is strong.  Patient is House-Brackmann I/VI bilaterally.  CARDIOVASCULAR: Extremities are warm and well-perfused.  No significant peripheral edema.  RESPIRATORY: Patient has nonlabored breathing without cough, wheeze, stridor.  PSYCHIATRIC: Patient is alert and oriented.  Mood and affect appear normal.  SKIN: Warm and dry.  No scalp, face, or neck lesions noted.    Procedure: Flexible Laryngoscopy   Indication: Burning Tongue    To best visualize the upper airway anatomy and due to the chief complaint and HPI, I proceeded with flexible fiberoptic laryngoscopy examination. The bilateral nasal cavities were anesthetized and decongested. The bilateral nasal cavities were examined using a flexible fiberoptic laryngoscope. There were no nasal cavity masses, polyps, or mucopurulence bilaterally. The nasal septum straight. The nasopharynx had a normal appearance with normal Eustachian tube openings and fossa of Rosenmuller bilaterally. Normal adenoid tissue. The base of tongue has slight cobblestoning, vallecula, epiglottis, aryepiglottic folds, arytenoids appear edematous, and piriform sinuses were without mass or lesion. The bilateral true vocal folds were symmetrically mobile without nodules or masses. The visualized  portions of the infraglottic and subglottic airway are unremarkable. The scope was removed. The patient tolerated the procedure well.                  Assessment and Plan     ICD-10-CM    1. Burning tongue  K14.6 Adult ENT  Referral        It was my pleasure seeing Venessa Guillen today in clinic.  Based on patient history and physical examination, it is possible that she is experiencing silent reflux. Therefore, we discussed having a laryngeal scope completed, which showed laryngeal reflux. Her symptoms could also be related to an autoimmune disorder, which we will rule out. We discussed starting Omeprazole, which we will do if all labs come back negative.     PETER Aguilar CNP  Otolaryngology  [facility]

## 2024-12-12 NOTE — NURSING NOTE
"Chief Complaint   Patient presents with    Consult     Burning tongue since September. More in the evening, but comes and goes       Vitals:    12/12/24 1002   BP: 109/74   BP Location: Right arm   Patient Position: Sitting   Cuff Size: Adult Regular   Pulse: 68   SpO2: 100%   Weight: 64.3 kg (141 lb 12.8 oz)   Height: 1.689 m (5' 6.5\")     Wt Readings from Last 1 Encounters:   12/12/24 64.3 kg (141 lb 12.8 oz)       Thu YANG CMA.................12/12/2024    "

## 2024-12-15 LAB — PYRIDOXAL PHOS SERPL-SCNC: 89.3 NMOL/L

## 2025-06-02 ENCOUNTER — OFFICE VISIT (OUTPATIENT)
Dept: DERMATOLOGY | Facility: CLINIC | Age: 40
End: 2025-06-02
Payer: COMMERCIAL

## 2025-06-02 DIAGNOSIS — D48.5 NEOPLASM OF UNCERTAIN BEHAVIOR OF SKIN: ICD-10-CM

## 2025-06-02 DIAGNOSIS — L82.1 SEBORRHEIC KERATOSES: ICD-10-CM

## 2025-06-02 DIAGNOSIS — D22.9 MULTIPLE BENIGN NEVI: Primary | ICD-10-CM

## 2025-06-02 DIAGNOSIS — D18.01 CHERRY ANGIOMA: ICD-10-CM

## 2025-06-02 DIAGNOSIS — Z12.83 SKIN CANCER SCREENING: ICD-10-CM

## 2025-06-02 NOTE — LETTER
6/2/2025      Venessa Guillen  [address]  [place] [address]      Dear Colleague,    Thank you for referring your patient, Venessa Guillen, to the [facility]. Please see a copy of my visit note below.    [facility] Dermatology Note  Encounter Date: Jun 2, 2025  Office Visit     Reviewed patients past medical history and pertinent chart review prior to patients visit today.     Dermatology Problem List:  Last skin check: 6/2/25    0. NUB, left midline chest, s/p shave biopsy Jun 2, 2025     1. 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 Venessa Guillen is a(n) 39 year old female who presents today as a return patient for a full body skin cancer screening. Today, the patient has no specific concerns, including no new or changing cutaneous lesion. She reports being diligent with photoprotection.    Patient is otherwise feeling well, without additional skin concerns.    Medications:  Current Outpatient Medications   Medication Sig Dispense Refill     acetaminophen (TYLENOL) 500 MG tablet Take 2 tablets (1,000 mg) by mouth every 6 hours as needed for mild pain 100 tablet 0     levonorgestrel (MIRENA) 20 MCG/24HR IUD 1 each (20 mcg) by Intrauterine route once       pantoprazole (PROTONIX) 40 MG EC tablet Take one tablet by mouth daily 30 minutes prior to breakfast for the next 8 weeks. 90 tablet 0     No current facility-administered medications for this visit.      Past  Medical History:   Patient Active Problem List   Diagnosis     CARDIOVASCULAR SCREENING; LDL GOAL LESS THAN 160     Multigravida of advanced maternal age in third trimester     Encounter for IUD insertion     Tachycardia     Wenckebach second degree AV block     Hyperthyroidism     Thyroid nodule     Burning tongue     Past Medical History:   Diagnosis Date     Chickenpox        _________________________________________     Physical Exam:  Vitals: There were no vitals taken for this visit.   SKIN: Total skin excluding the genitalia areas was performed. The exam included the scalp, face, neck, bilateral arms, chest, back, abdomen, bilateral legs, digits, mons pubis, buttocks, and nails.   - Grewal II.  - Multiple tan/brown macules and papules scattered throughout exam, consistent with benign nevi, many of which were examined under dermoscopy with no concerning features found  - Scattered tan, homogenous macules on sun exposed skin, consistent with solar lentigines  - Scattered waxy, stuck on appearing papules and patches, consistent with seborrheic keratoses  - Several 1-2 mm red dome shaped symmetric papules, consistent with cherry angiomas  -Left midline chest, 0.3 cm pink shiny papule with bluegray ovoid nests upon dermoscopy    - No other lesions of concern on areas examined        _________________________________________    Assessment & Plan:   # Nevi, trunk and extremities  # Solar lentigines  # History of atypical nevi, no signs of recurrence  - Nevi demonstrate no concerning features on dermoscopy. We discussed the importance of self exams at home.   - ABCDEs: Counseled ABCDEs of melanoma: Asymmetry, Border (irregularity), Color (not uniform, changes in color), Diameter (greater than 6 mm which is about the size of a pencil eraser), and Evolving (any changes in preexisting moles).  - Sun protection: Counseled SPF 30+ sunscreen, UPF clothing, sun avoidance, tanning bed avoidance.    # Cherry angiomas  #  Seborrheic keratoses  - We discussed the benign nature of the skin lesions. No treatment required. Continued observation recommended. Follow up with any concerns or changes.      # Neoplasm of uncertain behavior: Left midline chest.  DDx includes basal cell carcinoma. Shave biopsy today.  Procedure Note: Biopsy by shave technique  The risks and benefits of the procedure were described to the patient. These include but are not limited to bleeding, infection, scar, incomplete removal, and non-diagnostic biopsy. Verbal informed consent was obtained. The above site(s) was cleansed with an alcohol pad and injected with 1% lidocaine with epinephrine. Once anesthesia was obtained, a biopsy(ies) was performed with DermaBlade. The tissue(s) was placed in a labeled container(s) with formalin and sent to pathology. Hemostasis was achieved with aluminum chloride. Vaseline and a bandage were applied to the wound(s). The patient tolerated the procedure well and was given post biopsy care instructions.     Follow-up:  1 year(s) for follow up full body skin exam, prn for new or changing lesions or new concerns    All risks, benefits and alternatives were discussed with patient.  Patient is in agreement and understands the assessment and plan.  All questions were answered.    Amber Mosquera PA-C  [facility] Dermatology     Referred MD Dylan  No address on file on close of this encounter.    Again, thank you for allowing me to participate in the care of your patient.        Sincerely,        Amber Mosquera PA-C    Electronically signed

## 2025-06-02 NOTE — PATIENT INSTRUCTIONS
Wound Care After a Biopsy    What is a skin biopsy?  A skin biopsy allows the doctor to examine a very small piece of tissue under the microscope to determine the diagnosis and the best treatment for the skin condition. A local anesthetic (numbing medicine) is injected with a very small needle into the skin area to be tested. A small piece of skin is taken from the area. Sometimes a suture (stitch) is used.     What are the risks of a skin biopsy?  I will experience scar, bleeding, swelling, pain, crusting and redness. I may experience incomplete removal or recurrence. Risks of this procedure are excessive bleeding, bruising, infection, nerve damage, numbness, thick (hypertrophic or keloidal) scar and non-diagnostic biopsy.    How should I care for my wound for the first 24 hours?  Keep the wound dry and covered for 24 hours  If it bleeds, hold direct pressure on the area for 15 minutes. If bleeding does not stop, call us or go to the emergency room  Avoid strenuous exercise the first 1-2 days or as your doctor instructs you    How should I care for the wound after 24 hours?  After 24 hours, remove the bandage  You may bathe or shower as normal  If you had a scalp biopsy, you can shampoo as usual and can use shower water to clean the biopsy site daily  Clean the wound once a day with gentle soap and water  Do not scrub, be gentle  Apply white petroleum/Vaseline after cleaning the wound with a cotton swab or a clean finger, and keep the site covered with a Bandaid /bandage. Bandages are not necessary with a scalp biopsy  If you are unable to cover the site with a Bandaid /bandage, re-apply ointment 2-3 times a day to keep the site moist. Moisture will help with healing  Avoid strenuous activity for first 1-2 days  Avoid lakes, rivers, pools, and oceans until the stitches are removed or the site is healed    How do I clean my wound?  Wash hands thoroughly with soap or use hand  before all wound care  Clean  the wound with gentle soap and water  Apply white petroleum/Vaseline  to wound after it is clean  Replace the Bandaid /bandage to keep the wound covered for the first few days or as instructed by your doctor  If you had a scalp biopsy, warm shower water to the area on a daily basis should suffice    What should I use to clean my wound?   Cotton-tipped applicators (Qtips )  White petroleum jelly (Vaseline ). Use a clean new container and use Q-tips to apply.  Bandaids  as needed  Gentle soap     How should I care for my wound long term?  Do not get your wound dirty  Keep up with wound care for one week or until the area is healed.    A small scab will form and fall off by itself when the area is completely healed. The area will be red and will become pink in color as it heals. Sun protection is very important for how your scar will turn out. Sunscreen with an SPF 30 or greater is recommended once the area is healed.  You should have some soreness but it should be mild and slowly go away over several days. Talk to your doctor about using tylenol for pain,    When should I call my doctor?  If you have increased:   Pain or swelling  Pus or drainage (clear or slightly yellow drainage is ok)  Temperature over 100F  Spreading redness or warmth around wound    When will I hear about my results?  The biopsy results can take 2 weeks to come back.  Your results will automatically release to [employer] before your provider has even reviewed them.  The clinic will call you with the results, send you a [employer] message, or have you schedule a follow-up clinic or phone time to discuss the results.  Contact our clinics if you do not hear from us in 2 weeks.    Who should I call with questions?  [facility]: [phone]  [facility]: [phone]  For urgent needs outside of business hours call the [facility] at [phone] and ask for the dermatology resident on  call         Proper skin care from [facility]:    -Eliminate harsh soaps as they strip the natural oils from the skin, often resulting in dry itchy skin ( i.e. Dial, Zest, Nepalese Spring)  -Use mild soaps such as Cetaphil or Dove Sensitive Skin in the shower. You do not need to use soap on arms, legs, and trunk every time you shower unless visibly soiled.   -Avoid hot or cold showers.  -After showering, lightly dry off and apply moisturizing within 2-3 minutes. This will help trap moisture in the skin.   -Aggressive use of a moisturizer at least 1-2 times a day to the entire body (including -Vanicream, Cetaphil, Aquaphor or Cerave) and moisturize hands after every washing.  -We recommend using moisturizers that come in a tub that needs to be scooped out, not a pump. This has more of an oil base. It will hold moisture in your skin much better than a water base moisturizer. The above recommended are non-pore clogging.      Wear a sunscreen with at least SPF 30 on your face, ears, neck and V of the chest daily. Wear sunscreen on other areas of the body if those areas are exposed to the sun throughout the day. Sunscreens can contain physical and/or chemical blockers. Physical blockers are less likely to clog pores, these include zinc oxide and titanium dioxide. Reapply every two hour and after swimming.     Sunscreen examples: https://www.ewg.org/sunscreen/    UV radiation  UVA radiation remains constant throughout the day and throughout the year. It is a longer wavelength than UVB and therefore penetrates deeper into the skin leading to immediate and delayed tanning, photoaging, and skin cancer. 70-80% of UVA and UVB radiation occurs between the hours of 10am-2pm.  UVB radiation  UVB radiation causes the most harmful effects and is more significant during the summer months. However, snow and ice can reflect UVB radiation leading to skin damage during the winter months as well. UVB radiation is responsible for  tanning, burning, inflammation, delayed erythema (pinkness), pigmentation (brown spots), and skin cancer.     I recommend self monthly full body exams and yearly full body exams with a dermatology provider. If you develop a new or changing lesion please follow up for examination. Most skin cancers are pink and scaly or pink and pearly. However, we do see blue/brown/black skin cancers.  Consider the ABCDEs of melanoma when giving yourself your monthly full body exam ( don't forget the groin, buttocks, feet, toes, etc). A-asymmetry, B-borders, C-color, D-diameter, E-elevation or evolving. If you see any of these changes please follow up in clinic. If you cannot see your back I recommend purchasing a hand held mirror to use with a larger wall mirror.       Checking for Skin Cancer  You can find cancer early by checking your skin each month. There are 3 kinds of skin cancer. They are melanoma, basal cell carcinoma, and squamous cell carcinoma. Doing monthly skin checks is the best way to find new marks or skin changes. Follow the instructions below for checking your skin.   The ABCDEs of checking moles for melanoma   Check your moles or growths for signs of melanoma using ABCDE:   Asymmetry: the sides of the mole or growth don t match  Border: the edges are ragged, notched, or blurred  Color: the color within the mole or growth varies  Diameter: the mole or growth is larger than 6 mm (size of a pencil eraser)  Evolving: the size, shape, or color of the mole or growth is changing (evolving is not shown in the images below)    Checking for other types of skin cancer  Basal cell carcinoma or squamous cell carcinoma have symptoms such as:     A spot or mole that looks different from all other marks on your skin  Changes in how an area feels, such as itching, tenderness, or pain  Changes in the skin's surface, such as oozing, bleeding, or scaliness  A sore that does not heal  New swelling or redness beyond the border of a  mole    Who s at risk?  Anyone can get skin cancer. But you are at greater risk if you have:   Fair skin, light-colored hair, or light-colored eyes  Many moles or abnormal moles on your skin  A history of sunburns from sunlight or tanning beds  A family history of skin cancer  A history of exposure to radiation or chemicals  A weakened immune system  If you have had skin cancer in the past, you are at risk for recurring skin cancer.   How to check your skin  Do your monthly skin checkups in front of a full-length mirror. Check all parts of your body, including your:   Head (ears, face, neck, and scalp)  Torso (front, back, and sides)  Arms (tops, undersides, upper, and lower armpits)  Hands (palms, backs, and fingers, including under the nails)  Buttocks and genitals  Legs (front, back, and sides)  Feet (tops, soles, toes, including under the nails, and between toes)  If you have a lot of moles, take digital photos of them each month. Make sure to take photos both up close and from a distance. These can help you see if any moles change over time.   Most skin changes are not cancer. But if you see any changes in your skin, call your doctor right away. Only he or she can diagnose a problem. If you have skin cancer, seeing your doctor can be the first step toward getting the treatment that could save your life.   [employer] last reviewed this educational content on 4/1/2019 2000-2020 [email or web address]. [address] [place] [address], [place] [address]. All rights reserved. This information is not intended as a substitute for professional medical care. Always follow your healthcare professional's instructions.       When should I call my doctor?  If you are worsening or not improving, please, contact us or seek urgent care as noted below.     Who should I call with questions (adults)?    [facility] [phone]  For urgent needs outside of business hours call the [facility] at  [phone] and ask for the dermatology resident on call to be paged  If this is a medical emergency and you are unable to reach an ER, Call 911      If you need a prescription refill, please contact your pharmacy. Refills are approved or denied by our Physicians during normal business hours, Monday through Friday.  Per office policy, refills will not be granted if you have not been seen within the past year (or sooner depending on the condition).

## 2025-06-02 NOTE — PROGRESS NOTES
[facility] Dermatology Note  Encounter Date: Jun 2, 2025  Office Visit     Reviewed patients past medical history and pertinent chart review prior to patients visit today.     Dermatology Problem List:  Last skin check: 6/2/25    0. NUB, left midline chest, s/p shave biopsy Jun 2, 2025     1. 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 Venessa Guillen is a(n) 39 year old female who presents today as a return patient for a full body skin cancer screening. Today, the patient has no specific concerns, including no new or changing cutaneous lesion. She reports being diligent with photoprotection.    Patient is otherwise feeling well, without additional skin concerns.    Medications:  Current Outpatient Medications   Medication Sig Dispense Refill    acetaminophen (TYLENOL) 500 MG tablet Take 2 tablets (1,000 mg) by mouth every 6 hours as needed for mild pain 100 tablet 0    levonorgestrel (MIRENA) 20 MCG/24HR IUD 1 each (20 mcg) by Intrauterine route once      pantoprazole (PROTONIX) 40 MG EC tablet Take one tablet by mouth daily 30 minutes prior to breakfast for the next 8 weeks. 90 tablet 0     No current facility-administered medications for this visit.      Past Medical History:   Patient Active Problem List   Diagnosis    CARDIOVASCULAR SCREENING; LDL GOAL LESS THAN 160    Multigravida of advanced maternal age in third trimester    Encounter for IUD insertion    Tachycardia    Wenckebach second  degree AV block    Hyperthyroidism    Thyroid nodule    Burning tongue     Past Medical History:   Diagnosis Date    Chickenpox        _________________________________________     Physical Exam:  Vitals: There were no vitals taken for this visit.   SKIN: Total skin excluding the genitalia areas was performed. The exam included the scalp, face, neck, bilateral arms, chest, back, abdomen, bilateral legs, digits, mons pubis, buttocks, and nails.   - Grewal II.  - Multiple tan/brown macules and papules scattered throughout exam, consistent with benign nevi, many of which were examined under dermoscopy with no concerning features found  - Scattered tan, homogenous macules on sun exposed skin, consistent with solar lentigines  - Scattered waxy, stuck on appearing papules and patches, consistent with seborrheic keratoses  - Several 1-2 mm red dome shaped symmetric papules, consistent with cherry angiomas  -Left midline chest, 0.3 cm pink shiny papule with bluegray ovoid nests upon dermoscopy    - No other lesions of concern on areas examined        _________________________________________    Assessment & Plan:   # Nevi, trunk and extremities  # Solar lentigines  # History of atypical nevi, no signs of recurrence  - Nevi demonstrate no concerning features on dermoscopy. We discussed the importance of self exams at home.   - ABCDEs: Counseled ABCDEs of melanoma: Asymmetry, Border (irregularity), Color (not uniform, changes in color), Diameter (greater than 6 mm which is about the size of a pencil eraser), and Evolving (any changes in preexisting moles).  - Sun protection: Counseled SPF 30+ sunscreen, UPF clothing, sun avoidance, tanning bed avoidance.    # Cherry angiomas  # Seborrheic keratoses  - We discussed the benign nature of the skin lesions. No treatment required. Continued observation recommended. Follow up with any concerns or changes.      # Neoplasm of uncertain behavior: Left midline chest.  DDx includes  basal cell carcinoma. Shave biopsy today.  Procedure Note: Biopsy by shave technique  The risks and benefits of the procedure were described to the patient. These include but are not limited to bleeding, infection, scar, incomplete removal, and non-diagnostic biopsy. Verbal informed consent was obtained. The above site(s) was cleansed with an alcohol pad and injected with 1% lidocaine with epinephrine. Once anesthesia was obtained, a biopsy(ies) was performed with DermaBlade. The tissue(s) was placed in a labeled container(s) with formalin and sent to pathology. Hemostasis was achieved with aluminum chloride. Vaseline and a bandage were applied to the wound(s). The patient tolerated the procedure well and was given post biopsy care instructions.     Follow-up:  1 year(s) for follow up full body skin exam, prn for new or changing lesions or new concerns    All risks, benefits and alternatives were discussed with patient.  Patient is in agreement and understands the assessment and plan.  All questions were answered.    Amber Mosqeura PA-C  [facility] Dermatology    CC Referred MD Dylan  No address on file on close of this encounter.

## 2025-06-06 ENCOUNTER — RESULTS FOLLOW-UP (OUTPATIENT)
Dept: DERMATOLOGY | Facility: CLINIC | Age: 40
End: 2025-06-06

## 2025-06-23 ENCOUNTER — PATIENT OUTREACH (OUTPATIENT)
Dept: CARE COORDINATION | Facility: CLINIC | Age: 40
End: 2025-06-23
Payer: COMMERCIAL

## 2025-07-18 ENCOUNTER — ANCILLARY PROCEDURE (OUTPATIENT)
Dept: MAMMOGRAPHY | Facility: CLINIC | Age: 40
End: 2025-07-18
Attending: FAMILY MEDICINE
Payer: COMMERCIAL

## 2025-07-18 DIAGNOSIS — Z12.31 VISIT FOR SCREENING MAMMOGRAM: ICD-10-CM

## 2025-07-18 PROCEDURE — 77063 BREAST TOMOSYNTHESIS BI: CPT

## 2025-08-17 ENCOUNTER — MYC MEDICAL ADVICE (OUTPATIENT)
Dept: FAMILY MEDICINE | Facility: CLINIC | Age: 40
End: 2025-08-17
Payer: COMMERCIAL

## 2025-08-17 DIAGNOSIS — Z91.013 H/O ALLERGY TO SHELLFISH: ICD-10-CM

## 2025-08-18 RX ORDER — EPINEPHRINE 0.3 MG/.3ML
0.3 INJECTION SUBCUTANEOUS PRN
Qty: 0.3 ML | Refills: 3 | Status: SHIPPED | OUTPATIENT
Start: 2025-08-18

## (undated) RX ORDER — FENTANYL CITRATE 50 UG/ML
INJECTION, SOLUTION INTRAMUSCULAR; INTRAVENOUS
Status: DISPENSED
Start: 2024-06-28

## (undated) RX ORDER — FENTANYL CITRATE 50 UG/ML
INJECTION, SOLUTION INTRAMUSCULAR; INTRAVENOUS
Status: DISPENSED
Start: 2020-11-05

## (undated) RX ORDER — EPINEPHRINE 1 MG/ML(1)
AMPUL (ML) INJECTION
Status: DISPENSED
Start: 2020-11-05

## (undated) RX ORDER — BUPIVACAINE HYDROCHLORIDE 2.5 MG/ML
INJECTION, SOLUTION EPIDURAL; INFILTRATION; INTRACAUDAL
Status: DISPENSED
Start: 2018-10-12